# Patient Record
Sex: FEMALE | Race: WHITE | NOT HISPANIC OR LATINO | URBAN - METROPOLITAN AREA
[De-identification: names, ages, dates, MRNs, and addresses within clinical notes are randomized per-mention and may not be internally consistent; named-entity substitution may affect disease eponyms.]

---

## 2018-04-24 ENCOUNTER — ON CAMPUS - OUTPATIENT (OUTPATIENT)
Dept: URBAN - METROPOLITAN AREA HOSPITAL 108 | Facility: HOSPITAL | Age: 68
End: 2018-04-24
Payer: COMMERCIAL

## 2018-04-24 DIAGNOSIS — K63.5 POLYP OF COLON: ICD-10-CM

## 2018-04-24 DIAGNOSIS — D12.2 BENIGN NEOPLASM OF ASCENDING COLON: ICD-10-CM

## 2018-04-24 DIAGNOSIS — K57.30 DIVERTICULOSIS OF LARGE INTESTINE WITHOUT PERFORATION OR ABS: ICD-10-CM

## 2018-04-24 PROCEDURE — 45390 COLONOSCOPY W/RESECTION: CPT

## 2022-08-31 ENCOUNTER — OUTSIDE FACILITY SERVICE (OUTPATIENT)
Dept: CARDIOLOGY | Facility: CLINIC | Age: 72
End: 2022-08-31

## 2022-08-31 PROCEDURE — 99222 1ST HOSP IP/OBS MODERATE 55: CPT | Performed by: NURSE PRACTITIONER

## 2022-09-09 ENCOUNTER — HOSPITAL ENCOUNTER (INPATIENT)
Facility: HOSPITAL | Age: 72
LOS: 19 days | Discharge: HOME HEALTH | End: 2022-09-28
Attending: PHYSICAL MEDICINE & REHABILITATION | Admitting: PHYSICAL MEDICINE & REHABILITATION

## 2022-09-09 DIAGNOSIS — Z09 FOLLOW-UP EXAM: ICD-10-CM

## 2022-09-09 PROBLEM — I72.9 ANEURYSM: Status: ACTIVE | Noted: 2022-09-09

## 2022-09-09 RX ORDER — LISINOPRIL 10 MG/1
10 TABLET ORAL
Status: DISCONTINUED | OUTPATIENT
Start: 2022-09-10 | End: 2022-09-12

## 2022-09-09 RX ORDER — ALBUTEROL SULFATE 2.5 MG/3ML
2.5 SOLUTION RESPIRATORY (INHALATION) EVERY 6 HOURS PRN
Status: DISCONTINUED | OUTPATIENT
Start: 2022-09-09 | End: 2022-09-27

## 2022-09-09 RX ORDER — HYDROCODONE BITARTRATE AND ACETAMINOPHEN 5; 325 MG/1; MG/1
1 TABLET ORAL EVERY 4 HOURS PRN
Status: DISCONTINUED | OUTPATIENT
Start: 2022-09-09 | End: 2022-09-20

## 2022-09-09 RX ORDER — AMLODIPINE BESYLATE 5 MG/1
5 TABLET ORAL
Status: DISCONTINUED | OUTPATIENT
Start: 2022-09-10 | End: 2022-09-28 | Stop reason: HOSPADM

## 2022-09-09 RX ORDER — ATORVASTATIN CALCIUM 20 MG/1
40 TABLET, FILM COATED ORAL NIGHTLY
Status: DISCONTINUED | OUTPATIENT
Start: 2022-09-09 | End: 2022-09-28 | Stop reason: HOSPADM

## 2022-09-09 RX ORDER — SERTRALINE HYDROCHLORIDE 25 MG/1
25 TABLET, FILM COATED ORAL DAILY
Status: DISCONTINUED | OUTPATIENT
Start: 2022-09-10 | End: 2022-09-28 | Stop reason: HOSPADM

## 2022-09-09 RX ORDER — PANTOPRAZOLE SODIUM 40 MG/1
40 TABLET, DELAYED RELEASE ORAL
Status: DISCONTINUED | OUTPATIENT
Start: 2022-09-09 | End: 2022-09-28 | Stop reason: HOSPADM

## 2022-09-09 RX ORDER — SERTRALINE HYDROCHLORIDE 100 MG/1
100 TABLET, FILM COATED ORAL DAILY
Status: ON HOLD | COMMUNITY
End: 2022-09-12

## 2022-09-09 RX ORDER — ATORVASTATIN CALCIUM 40 MG/1
40 TABLET, FILM COATED ORAL DAILY
Status: ON HOLD | COMMUNITY
End: 2022-09-12

## 2022-09-09 RX ORDER — PANTOPRAZOLE SODIUM 40 MG/1
40 TABLET, DELAYED RELEASE ORAL DAILY
Status: ON HOLD | COMMUNITY
End: 2022-09-12

## 2022-09-09 RX ORDER — PANTOPRAZOLE SODIUM 20 MG/1
20 TABLET, DELAYED RELEASE ORAL
Status: DISCONTINUED | OUTPATIENT
Start: 2022-09-09 | End: 2022-09-09 | Stop reason: CLARIF

## 2022-09-09 RX ORDER — MELATONIN
2000 DAILY
Status: DISCONTINUED | OUTPATIENT
Start: 2022-09-10 | End: 2022-09-28 | Stop reason: HOSPADM

## 2022-09-09 RX ORDER — HYDROCODONE BITARTRATE AND ACETAMINOPHEN 5; 325 MG/1; MG/1
2 TABLET ORAL EVERY 4 HOURS PRN
Status: DISCONTINUED | OUTPATIENT
Start: 2022-09-09 | End: 2022-09-16

## 2022-09-09 RX ADMIN — HYDROCODONE BITARTRATE AND ACETAMINOPHEN 1 TABLET: 5; 325 TABLET ORAL at 22:54

## 2022-09-10 ENCOUNTER — APPOINTMENT (OUTPATIENT)
Dept: OTHER | Facility: HOSPITAL | Age: 72
End: 2022-09-10

## 2022-09-10 PROCEDURE — 97166 OT EVAL MOD COMPLEX 45 MIN: CPT

## 2022-09-10 PROCEDURE — 92610 EVALUATE SWALLOWING FUNCTION: CPT

## 2022-09-10 PROCEDURE — 92522 EVALUATE SPEECH PRODUCTION: CPT

## 2022-09-10 PROCEDURE — 97162 PT EVAL MOD COMPLEX 30 MIN: CPT

## 2022-09-10 PROCEDURE — 97110 THERAPEUTIC EXERCISES: CPT

## 2022-09-10 PROCEDURE — 97535 SELF CARE MNGMENT TRAINING: CPT

## 2022-09-10 PROCEDURE — 25010000002 ENOXAPARIN PER 10 MG: Performed by: PHYSICAL MEDICINE & REHABILITATION

## 2022-09-10 PROCEDURE — 97530 THERAPEUTIC ACTIVITIES: CPT

## 2022-09-10 RX ORDER — ENOXAPARIN SODIUM 100 MG/ML
40 INJECTION SUBCUTANEOUS NIGHTLY
Status: DISCONTINUED | OUTPATIENT
Start: 2022-09-10 | End: 2022-09-28 | Stop reason: HOSPADM

## 2022-09-10 RX ADMIN — ENOXAPARIN SODIUM 40 MG: 100 INJECTION SUBCUTANEOUS at 20:44

## 2022-09-10 RX ADMIN — SERTRALINE 25 MG: 25 TABLET, FILM COATED ORAL at 10:01

## 2022-09-10 RX ADMIN — AMLODIPINE BESYLATE 5 MG: 5 TABLET ORAL at 10:01

## 2022-09-10 RX ADMIN — LISINOPRIL 10 MG: 10 TABLET ORAL at 10:01

## 2022-09-10 RX ADMIN — HYDROCODONE BITARTRATE AND ACETAMINOPHEN 1 TABLET: 5; 325 TABLET ORAL at 06:19

## 2022-09-10 RX ADMIN — ATORVASTATIN CALCIUM 40 MG: 20 TABLET, FILM COATED ORAL at 20:44

## 2022-09-10 RX ADMIN — Medication 2000 UNITS: at 10:02

## 2022-09-10 RX ADMIN — PANTOPRAZOLE SODIUM 40 MG: 40 TABLET, DELAYED RELEASE ORAL at 06:19

## 2022-09-10 NOTE — THERAPY EVALUATION
Inpatient Rehabilitation - Speech Language Pathology Initial Evaluation    [facility]     Patient Name: Summer Smith  : 1950  MRN: [id number]    Today's Date: 9/10/2022                   Admit Date: 2022       Visit Dx:      ICD-10-CM ICD-9-CM   1. Follow-up exam  Z09 V67.9       Patient Active Problem List   Diagnosis   • Aneurysm (HCC)       Past Medical History:   Diagnosis Date   • COPD (chronic obstructive pulmonary disease) (HCC)    • Elevated cholesterol    • GERD (gastroesophageal reflux disease)        Past Surgical History:   Procedure Laterality Date   • CARDIAC CATHETERIZATION     • COLON SURGERY     • COLONOSCOPY     • SKIN BIOPSY         SLP Recommendation and Plan                      Therapy Frequency (Swallow): 5 days per week (09/10/22 0800)  Predicted Duration Therapy Intervention (Days): until discharge, 4 weeks, other (see comments) (09/10/22 08)                                EDUCATION    The patient has been educated in the following areas:       Dysphagia (Swallowing Impairment).             SLP GOALS     Row Name 09/10/22 0800             (LTG) Patient will demonstrate functional swallow for    [place] (Demonstrate functional swallow) independently (over 90% accuracy)  -LS              (LTG) Patient will demonstrate progress toward functional swallow for    Diet Texture (Demonstrate progress toward functional swallow) soft ground textures  -LS      Liquid viscosity (Demonstrate progress toward functional swallow) thin liquids  -LS      [place] (Demonstrate progress towards functional swallow) with moderate cues (50-74% accuracy)  -LS            User Key  (r) = Recorded By, (t) = Taken By, (c) = Cosigned By    Initials Name Provider Type    Geovany Tolliver MS CCC-SLP Speech and Language Pathologist                SLP Outcome Measures (last 72 hours)     SLP Outcome Measures     Row Name 09/10/22 1200             SLP Outcome Measures    Outcome Measure  Used? Adult NOMS  -LS              Adult FCM Scores    FCM Chosen Swallowing  -LS      Swallowing FCM Score 4  -LS            User Key  (r) = Recorded By, (t) = Taken By, (c) = Cosigned By    Initials Name Effective Dates    Geovany Tolliver MS CCC-SLP 06/16/21 -                         Time Calculation:        Time Calculation- SLP     Row Name 09/10/22 1201             Time Calculation- SLP    SLP Start Time 0800  -LS      SLP Stop Time 0830  -LS      SLP Time Calculation (min) 30 min  -LS            User Key  (r) = Recorded By, (t) = Taken By, (c) = Cosigned By    Initials Name Provider Type    Geovany Tolliver MS CCC-SLP Speech and Language Pathologist                  Therapy Charges for Today     Code Description Service Date Service Provider Modifiers Qty    [id number]  ST EVAL ORAL PHARYNG SWALLOW 2 9/10/2022 Geovany Porras MS CCC-SLP GN 1            ADULT NOMS (last 72 hours)     Adult NOMS     Row Name 09/10/22 1200                   Adult FCM Scores    FCM Chosen Swallowing  -LS        Swallowing FCM Score 4  -LS              User Key  (r) = Recorded By, (t) = Taken By, (c) = Cosigned By    Initials Name Effective Dates    Geovany Tolliver MS CCC-SLP 06/16/21 -                            Geovany Porras MS CCC-SLP  9/10/2022

## 2022-09-10 NOTE — THERAPY EVALUATION
Inpatient Rehabilitation - Physical Therapy Initial Evaluation       [facility]     Patient Name: Summer Smith  : 1950  MRN: [id number]    Today's Date: 9/10/2022                    Admit Date: 2022      Visit Dx:     ICD-10-CM ICD-9-CM   1. Follow-up exam  Z09 V67.9       Patient Active Problem List   Diagnosis   • Aneurysm (HCC)       Past Medical History:   Diagnosis Date   • COPD (chronic obstructive pulmonary disease) (HCC)    • Elevated cholesterol    • GERD (gastroesophageal reflux disease)        Past Surgical History:   Procedure Laterality Date   • CARDIAC CATHETERIZATION     • COLON SURGERY     • COLONOSCOPY     • SKIN BIOPSY         PT ASSESSMENT (last 12 hours)     IRF PT Evaluation and Treatment     Row Name 09/10/22 0952          PT Time and Intention    Document Type initial evaluation  -EE     Mode of Treatment physical therapy;individual therapy  -EE     Patient/Family/Caregiver Comments/Observations Pt supine in bed, agreeable to PT. Family present at bedside.  -EE     Row Name 09/10/22 0952          General Information    Existing Precautions/Restrictions fall;oxygen therapy device and L/min  colostomy, 4 L O2 nc  -EE     Limitations/Impairments safety/cognitive  -EE     Row Name 09/10/22 0952          Previous Level of Function/Home Environm    Bed Mobility, Premorbid Functional Level independent  -EE     Transfers, Premorbid Functional Level independent  -EE     Household Ambulation, Premorbid Functional Level independent  -EE     Stairs, Premorbid Functional Level independent  -EE     Community Ambulation, Premorbid Functional Level independent  -EE     Row Name 09/10/22 0952          Living Environment    Home Accessibility wheelchair accessible  1 KVNG but also has ramp present  -EE     People in Home spouse  -EE     Row Name 09/10/22 0952          Pain Assessment    Pretreatment Pain Rating 0/10 - no pain  -EE     Posttreatment Pain Rating 5/10  -EE     Pain Location  incisional  -EE     Pain Location - abdomen  -EE     Pre/Posttreatment Pain Comment Pt provided rest breaks as needed throughout session. Pre-medicated prior to tx.  -EE     Row Name 09/10/22 0952          Cognition/Psychosocial    Affect/Mental Status (Cognition) flat/blunted affect  -EE     Orientation Status (Cognition) oriented to;person;place  -EE     Follows Commands (Cognition) follows one-step commands;over 90% accuracy;increased processing time needed;repetition of directions required;verbal cues/prompting required  -EE     Personal Safety Interventions fall prevention program maintained;gait belt;muscle strengthening facilitated;nonskid shoes/slippers when out of bed;supervised activity  -EE     Cognitive Function safety deficit  -EE     Safety Deficit (Cognition) minimal deficit;insight into deficits/self-awareness;safety precautions awareness  -EE     Row Name 09/10/22 0952          Range of Motion (ROM)    Range of Motion bilateral lower extremities;ROM is WFL  -     Row Name 09/10/22 0952          Strength Comprehensive (MMT)    General Manual Muscle Testing (MMT) Assessment lower extremity strength deficits identified  -     Row Name 09/10/22 0952          Lower Extremity (Manual Muscle Testing)    Lower Extremity: Manual Muscle Testing (MMT) right hip strength deficit;left knee strength deficit;right knee strength deficit;left ankle strength deficit;right ankle strength deficit;left hip strength deficit  -     Row Name 09/10/22 0952          Left Hip (Manual Muscle Testing)    MMT, Left Hip: Manual Muscle Testing (MMT) L hip flexion 3-/5, L hip abd 3-/5, L hip add 3/5  -EE     Row Name 09/10/22 0952          Right Hip (Manual Muscle Testing)    MMT, Right Hip: Manual Muscle Testing (MMT) R hip flexion 2+/5, R hip abd 3-/5, R hip add 3/5  -EE     Row Name 09/10/22 0952          Left Knee (Manual Muscle Testing)    Comment, Left Knee: Manual Muscle Testing (MMT) L knee ext 3+/5, L knee flex 3/5   -EE     Row Name 09/10/22 0952          Right Knee (Manual Muscle Testing)    Comment, Right Knee: Manual Muscle Testing (MMT) R knee ext 3+/5, R knee flex 3/5  -EE     Row Name 09/10/22 0952          Left Ankle/Foot (Manual Muscle Testing)    Comment, MMT: Left Ankle/Foot L ankle DF 3+/5  -EE     Row Name 09/10/22 0952          Right Ankle/Foot (Manual Muscle Testing)    Comment, MMT: Right Ankle/Foot R ankle DF 3+/5  -EE     Row Name 09/10/22 0952          Sensory    Additional Documentation Sensory Assessment (Somatosensory) (Group)  -     Row Name 09/10/22 0952          Sensory Assessment (Somatosensory)    Sensory Assessment (Somatosensory) LE sensation intact  -     Row Name 09/10/22 0952          Mobility    Additional Documentation Wheelchair Mobility/Management (Group)  -     Row Name 09/10/22 0952          Bed Mobility    Bed Mobility rolling right;rolling left;supine-sit;sit-supine  -EE     Rolling Left [place] (Bed Mobility) moderate assist (50% patient effort);verbal cues  -EE     Rolling Right [place] (Bed Mobility) moderate assist (50% patient effort);verbal cues  -EE     Supine-Sit [place] (Bed Mobility) moderate assist (50% patient effort);maximum assist (25% patient effort);1 person assist;verbal cues  -EE     Sit-Supine [place] (Bed Mobility) moderate assist (50% patient effort);2 person assist;verbal cues;nonverbal cues (demo/gesture)  -     Assistive Device (Bed Mobility) bed rails;head of bed elevated  -     Comment, (Bed Mobility) cues for sequencing  -     Row Name 09/10/22 0952          Transfer Assessment/Treatment    Transfers sit-stand transfer;stand-sit transfer;bed-chair transfer  -[facility] Name 09/10/22 0952          Transfers    Bed-Chair [place] (Transfers) moderate assist (50% patient effort);2 person assist;verbal cues;nonverbal cues (demo/gesture)  -     Sit-Stand [place] (Transfers) moderate assist (50% patient effort);1 person  assist;2 person assist;other (see comments);verbal cues;nonverbal cues (demo/gesture)  -EE     Stand-Sit [place] (Transfers) moderate assist (50% patient effort);1 person assist;verbal cues;nonverbal cues (demo/gesture)  -EE     Row Name 09/10/22 0952          Bed-Chair Transfer    Comment, (Bed-Chair Transfer) stand pivot transfer with HHA, partial R knee buckling noted during pivot to chair  -EE     Row Name 09/10/22 0952          Sit-Stand Transfer    Assistive Device (Sit-Stand Transfers) parallel bars;wheelchair  -EE     Comment, (Sit-Stand Transfer) mod A x1 on first attempt; required mod A x2 on subsequent attempts due to fatigue  -EE     Row Name 09/10/22 0952          Stand-Sit Transfer    Assistive Device (Stand-Sit Transfers) wheelchair  -EE     Row Name 09/10/22 0952          Gait/Stairs (Locomotion)    [place] Level (Gait) unable to assess;other (see comments)  attempted taking step forward in // bars; pt unable to advance LE and maintain standing balance  -EE     Row Name 09/10/22 0952          Wheelchair Mobility/Management    Method of Wheelchair Locomotion (Mobility) bipedal (lower extremity) propulsion  -EE     Mobility Activities (Wheelchair) forward propulsion  -EE     Forward Propulsion [place] (Wheelchair) standby assist;verbal cues  -EE     Distance Propelled in Feet (Wheelchair) 30' x 1  -EE     Row Name 09/10/22 0952          Safety Issues, Functional Mobility    Impairments Affecting Function (Mobility) balance;cognition;endurance/activity tolerance;pain;postural/trunk control;strength  -EE     Row Name 09/10/22 0952          Balance    Balance Assessment sitting static balance;standing static balance  -EE     Static Sitting Balance standby assist  -EE     Position, Sitting Balance unsupported;sitting edge of bed  -EE     Static Standing Balance minimal assist;moderate assist  -EE     Position/Device Used, Standing Balance parallel bars  -EE     Row Name 09/10/22 0952           Motor Skills    Therapeutic Exercise hip;knee  -EE     Row Name 09/10/22 0952          Hip (Therapeutic Exercise)    Hip (Therapeutic Exercise) strengthening exercise  -EE     Hip Strengthening (Therapeutic Exercise) sitting;bilateral;marching while seated;5 repetitions  -EE     Row Name 09/10/22 0952          Knee (Therapeutic Exercise)    Knee (Therapeutic Exercise) strengthening exercise  -EE     Knee Strengthening (Therapeutic Exercise) sitting;bilateral;LAQ (long arc quad);5 repetitions  -EE     Row Name 09/10/22 0952          Positioning and Restraints    Pre-Treatment Position in bed  -EE     Post Treatment Position wheelchair  -EE     In Wheelchair sitting;exit alarm on;with SLP  -EE     Row Name 09/10/22 0952          Vital Signs    Pre SpO2 (%) 95  -EE     O2 Delivery Pre Treatment supplemental O2  -EE     Intra SpO2 (%) 85  -EE     O2 Delivery Intra Treatment supplemental O2  -EE     Post SpO2 (%) 93  -EE     O2 Delivery Post Treatment supplemental O2  -EE     Pre Patient Position Supine  -EE     Intra Patient Position Standing  -EE     Post Patient Position Sitting  -EE     Row Name 09/10/22 0952          Therapy Assessment/Plan (PT)    Patient's Goals For Discharge return home;take care of myself at home  -EE     Row Name 09/10/22 0952          Therapy Assessment/Plan (PT)    Rehab Potential/Prognosis (PT) good, to achieve stated therapy goals  -EE     Frequency of Treatment (PT) 60 minutes per session;5 times per week  -EE     Estimated Duration of Therapy (PT) 4 weeks  -EE     Problem List (PT) balance;mobility;motor control;strength;postural control  -EE     Activity Limitations Related to Problem List (PT) unable to transfer safely;unable to ambulate safely  -EE     Comment, Therapy Assessment/Plan (PT) Pt is a 72 yo female who presents s/p L MCA aneurysm with B PNA, diverticulitis s/p ex lap w bowel resection and colostomy. Pt had post op complications and required prolonged intubation. Prior to  admission, pt was living at home and independent with all mobility. Pt has no history of assistive device use. Upon exam, pt demonstrates generalized weakness, impaired balance, impaired trunk control, and decreased endurance limiting mobility. Pt requires assist x2 for bed mobility and transfers; unable to ambulate at this time due to weakness and fatigue. Pt will continue to benefit from PT to address impairments and increase independence with functional mobility.  -EE     Row Name 09/10/22 0952          IRF PT Goals    Bed Mobility Goal Selection (PT-IRF) bed mobility, PT goal 1;bed mobility, PT goal 2  -EE     Transfer Goal Selection (PT-IRF) transfers, PT goal 1;transfers, PT goal 2;transfers, PT goal 3  -EE     Gait (Walking Locomotion) Goal Selection (PT-IRF) gait, PT goal 1;gait, PT goal 2  -EE     Wheelchair Locomotion Goal Selection (PT-IRF) wheelchair locomotion, PT goal 1  -EE     Row Name 09/10/22 0952          Bed Mobility Goal 1 (PT-IRF)    Activity/Assistive Device (Bed Mobility Goal 1, PT-IRF) sit to supine/supine to sit  -EE     [place] Level (Bed Mobility Goal 1, PT-IRF) minimum assist (75% or more patient effort)  -EE     Time Frame (Bed Mobility Goal 1, PT-IRF) 2 weeks;short-term goal (STG)  -EE     Progress/Outcomes (Bed Mobility Goal 1, PT-IRF) goal ongoing  -EE     Row Name 09/10/22 0952          Bed Mobility Goal 2 (PT-IRF)    Activity/Assistive Device (Bed Mobility Goal 2, PT-IRF) sit to supine/supine to sit;rolling to left;rolling to right  -EE     [place] Level (Bed Mobility Goal 2, PT-IRF) independent  -EE     Time Frame (Bed Mobility Goal 2, PT-IRF) 4 weeks;long-term goal (LTG)  -EE     Progress/Outcomes (Bed Mobility Goal 2, PT-IRF) goal ongoing  -EE     Row Name 09/10/22 0952          Transfer Goal 1 (PT-IRF)    Activity/Assistive Device (Transfer Goal 1, PT-IRF) sit-to-stand/stand-to-sit;bed-to-chair/chair-to-bed  -EE     [place] Level (Transfer Goal 1, PT-IRF)  moderate assist (50-74% patient effort)  -EE     Time Frame (Transfer Goal 1, PT-IRF) 2 weeks;short-term goal (STG)  -EE     Progress/Outcomes (Transfer Goal 1, PT-IRF) goal ongoing  -EE     Row Name 09/10/22 0952          Transfer Goal 2 (PT-IRF)    Activity/Assistive Device (Transfer Goal 2, PT-IRF) sit-to-stand/stand-to-sit;bed-to-chair/chair-to-bed  -EE     [place] Level (Transfer Goal 2, PT-IRF) contact guard required  -EE     Time Frame (Transfer Goal 2, PT-IRF) 4 weeks;long-term goal (LTG)  -EE     Progress/Outcomes (Transfer Goal 2, PT-IRF) goal ongoing  -EE     Row Name 09/10/22 0952          Transfer Goal 3 (PT-IRF)    Activity/Assistive Device (Transfer Goal 3, PT-IRF) car transfer  -EE     [place] Level (Transfer Goal 3, PT-IRF) contact guard required  -EE     Time Frame (Transfer Goal 3, PT-IRF) 4 weeks;long-term goal (LTG)  -EE     Progress/Outcomes (Transfer Goal 3, PT-IRF) goal ongoing  -EE     Row Name 09/10/22 0952          Gait/Walking Locomotion Goal 1 (PT-IRF)    Activity/Assistive Device (Gait/Walking Locomotion Goal 1, PT-IRF) gait (walking locomotion);other (see comments)  in // bars  -EE     Gait/Walking Locomotion Distance Goal 1 (PT-IRF) 8'  -EE     [place] Level (Gait/Walking Locomotion Goal 1, PT-IRF) minimum assist (75% or more patient effort)  x2 person assist  -EE     Time Frame (Gait/Walking Locomotion Goal 1, PT-IRF) 2 weeks;short-term goal (STG)  -EE     Progress/Outcomes (Gait/Walking Locomotion Goal 1, PT-IRF) goal ongoing  -EE     Row Name 09/10/22 0952          Gait/Walking Locomotion Goal 2 (PT-IRF)    Activity/Assistive Device (Gait/Walking Locomotion Goal 2, PT-IRF) gait (walking locomotion);walker, rolling  -EE     Gait/Walking Locomotion Distance Goal 2 (PT-IRF) 80'  -EE     [place] Level (Gait/Walking Locomotion Goal 2, PT-IRF) contact guard required  -EE     Time Frame (Gait/Walking Locomotion Goal 2, PT-IRF) 4 weeks;long-term goal (LTG)  -EE      Progress/Outcomes (Gait/Walking Locomotion Goal 2, PT-IRF) goal ongoing  -EE     Row Name 09/10/22 0952          Wheelchair Locomotion Goal 1 (PT-IRF)    Activity (Wheelchair Locomotion Goal 1, PT-IRF) forward propulsion;steering;turning  -EE     [place] Level (Wheelchair Locomotion Goal 1, PT-IRF) modified independence  -EE     Distance Goal 1 (Wheelchair Locomotion, PT-IRF) 80' x 1  -EE     Time Frame (Wheelchair Locomotion Goal 1, PT-IRF) 2 weeks;short-term goal (STG)  -EE     Progress/Outcomes (Wheelchair Locomotion Goal 1, PT-IRF) goal ongoing  -EE           User Key  (r) = Recorded By, (t) = Taken By, (c) = Cosigned By    Initials Name Provider Type    EE Rand Patel, MARIBEL Physical Therapist              Wound 09/09/22 2200 Left midline abdomen Incision (Active)   Dressing Appearance open to air 09/10/22 1025   Closure Adhesive closure strips 09/10/22 1025   Drainage Amount small 09/10/22 1025   Care, Wound cleansed with;soap and water 09/10/22 1025   Dressing Care open to air 09/10/22 1025     Physical Therapy Education                 Title: PT OT SLP Therapies (Done)     Topic: Physical Therapy (Done)     Point: Mobility training (Done)     Learning Progress Summary           Patient Acceptance, E,TB, VU,NR by EE at 9/10/2022 1518                   Point: Home exercise program (Done)     Learning Progress Summary           Patient Acceptance, E,TB, VU,NR by EE at 9/10/2022 1518                   Point: Body mechanics (Done)     Learning Progress Summary           Patient Acceptance, E,TB, VU,NR by EE at 9/10/2022 1518                   Point: Precautions (Done)     Learning Progress Summary           Patient Acceptance, E,TB, VU,NR by EE at 9/10/2022 1518                               User Key     Initials Effective Dates Name Provider Type Discipline    EE 06/16/21 -  Rand Patel, MARIBEL Physical Therapist PT                PT Recommendation and Plan    Planned Therapy Interventions (PT): balance  training, bed mobility training, gait training, home exercise program, neuromuscular re-education, patient/family education, ROM (range of motion), strengthening, stair training, stretching, transfer training, wheelchair management/propulsion training  Frequency of Treatment (PT): 60 minutes per session, 5 times per week                     Time Calculation:      PT Charges     Row Name 09/10/22 1517             Time Calculation    Start Time 0900  -EE      Stop Time 1000  -EE      Time Calculation (min) 60 min  -EE      PT Received On 09/10/22  -EE      PT - Next Appointment 09/12/22  -EE      PT Goal Re-Cert Due Date 09/17/22  -EE              Time Calculation- PT    Total Timed Code Minutes- PT 45 minute(s)  -EE            User Key  (r) = Recorded By, (t) = Taken By, (c) = Cosigned By    Initials Name Provider Type    Rand Dietrich, PT Physical Therapist                Therapy Charges for Today     Code Description Service Date Service Provider Modifiers Qty    59837951098  PT EVAL MOD COMPLEXITY 2 9/10/2022 Rand Patel, PT GP 1    [id number]  PT THERAPEUTIC ACT EA 15 MIN 9/10/2022 Rand Patel, PT GP 2    25687309349 HC PT THER PROC EA 15 MIN 9/10/2022 Rand Patel, PT GP 1    56117043867 HC PT THER SUPP EA 15 MIN 9/10/2022 Rand Patel, PT GP 2              Patient was intermittently wearing a face mask during this therapy encounter. Therapist used appropriate personal protective equipment including mask and gloves.  Mask used was standard procedure mask. Appropriate PPE was worn during the entire therapy session. Hand hygiene was completed before and after therapy session. Patient is not in enhanced droplet precautions.         Rand Patel PT  9/10/2022

## 2022-09-10 NOTE — THERAPY EVALUATION
Inpatient Rehabilitation - Occupational Therapy Treatment Note    [facility]     Patient Name: Summer Smith  : 1950  MRN: [id number]    Today's Date: 9/10/2022                 Admit Date: 2022         ICD-10-CM ICD-9-CM   1. Follow-up exam  Z09 V67.9       Patient Active Problem List   Diagnosis   • Aneurysm (HCC)       Past Medical History:   Diagnosis Date   • COPD (chronic obstructive pulmonary disease) (HCC)    • Elevated cholesterol    • GERD (gastroesophageal reflux disease)        Past Surgical History:   Procedure Laterality Date   • CARDIAC CATHETERIZATION     • COLON SURGERY     • COLONOSCOPY     • SKIN BIOPSY               IRF OT ASSESSMENT FLOWSHEET (last 12 hours)     IRF OT Evaluation and Treatment     Row Name 09/10/22 1116          OT Time and Intention    Document Type initial evaluation  -KA     Mode of Treatment individual therapy;occupational therapy  -KA     Patient Effort good  Demonstrated good effort but pt very fatigued after completing previous therapies  -KA     Symptoms Noted During/After Treatment fatigue;increased pain  reported 6/10 pain in stomach. Nsg present upon arrival  -KA     Row Name 09/10/22 1116          General Information    Patient/Family/Caregiver Comments/Observations Pt supine in bed with nsg upon arrival. RN stated pt was very fatigued from previous therapies and incision was leaking. Requested to perform in bed activities. Pt agreeable to bed level ADL and assessment  -KA     Existing Precautions/Restrictions fall;oxygen therapy device and L/min  colostomy  -KA     Limitations/Impairments safety/cognitive  -KA     Row Name 09/10/22 1116          Previous Level of Function/Home Environm    Bathing/Grooming, Premorbid Functional Level independent  -KA     Dressing, Premorbid Functional Level independent  -KA     Eating/Feeding, Premorbid Functional Level independent  -KA     Toileting, Premorbid Functional Level independent  -KA     BADLs, Premorbid  Functional Level independent  -KA     IADLs, Premorbid Functional Level independent  -[facility] Name 09/10/22 1116          Living Environment    Current Living Arrangements home  -     Home Accessibility wheelchair accessible  pt's  reports 1 KVNG but also has a ramp present  -     People in Home spouse  -[facility] Name 09/10/22 1116          Home Use of Assistive/Adaptive Equipment    Equipment Currently Used at Home none  -[facility] Name 09/10/22 1116          Pain Assessment    Pretreatment Pain Rating 5/10  -KA     Posttreatment Pain Rating 5/10  -KA     Pain Location incisional  -     Pain Location - abdomen  -[facility] Name 09/10/22 1116          Cognition/Psychosocial    Affect/Mental Status (Cognition) flat/blunted affect  -     Orientation Status (Cognition) oriented to;person;place  -     Follows Commands (Cognition) follows one-step commands;over 90% accuracy;increased processing time needed;repetition of directions required;verbal cues/prompting required  -     Safety Deficit (Cognition) minimal deficit;insight into deficits/self-awareness;safety precautions awareness  -[facility] Name 09/10/22 1116          Range of Motion (ROM)    Range of Motion left upper extremity ROM;right upper extremity ROM  -     Left Upper Extremity (ROM) left UE ROM is WFL  slow movements with ROM  -     Right Upper Extremity (ROM) --  RUE shoulder flex ~90 degrees. Assessment performed sitting up in bed due to fatigue. WFL elbow-digits  -[facility] Name 09/10/22 1116          Strength Comprehensive (MMT)    General Manual Muscle Testing (MMT) Assessment upper extremity strength deficits identified  -KA     Comment, General Manual Muscle Testing (MMT) Assessment BUE 3-/5 shoulder flex and elbow flex. Limited assesment due to pt's fatigue  Performed sitting up in bed due to increased fatigue  -[facility] Name 09/10/22 1116          Basic Activities of Daily Living (BADLs)    Basic Activities of Daily  Living bathing;upper body dressing;lower body dressing;grooming  -KA     Row Name 09/10/22 1116          Bathing    [place] Level (Bathing) upper body;minimum assist (75% patient effort);moderate assist (50% patient effort);lower body;dependent (less than 25% patient effort)  -     Position (Bathing) sitting up in bed  -     Set-up Assistance (Bathing) obtain supplies;open containers  -     Comment (Bathing) Sitting up in bed due to pt's fatigue  -KA     Row Name 09/10/22 1116          Upper Body Dressing    [place] Level (Upper Body Dressing) upper body dressing skills;don;minimum assist (75% or more patient effort);moderate assist (50-74% patient effort);doff  Pt requesting to don new gown. Declined changing into shirt  -     Position (Upper Body Dressing) sitting up in bed  -     Set-up Assistance (Upper Body Dressing) obtain clothing  -KA     Row Name 09/10/22 1116          Lower Body Dressing    [place] Level (Lower Body Dressing) dependent (less than 25% patient effort)  -     Position (Lower Body Dressing) sitting up in bed;supine  -KA     Row Name 09/10/22 1116          Grooming    [place] Level (Grooming) grooming skills;wash face, hands;minimum assist (75% patient effort)  -     Position (Grooming) sitting up in bed  -     Set-up Assistance (Grooming) open containers;obtain supplies  -KA     Row Name 09/10/22 1116          Bed Mobility    Rolling Left [place] (Bed Mobility) moderate assist (50% patient effort);verbal cues  -     Rolling Right [place] (Bed Mobility) moderate assist (50% patient effort);verbal cues  -KA     Row Name 09/10/22 1116          Functional Mobility    Functional Mobility- Ind. Level not tested  -KA     Row Name 09/10/22 1116          Transfers    Bed-Chair [place] (Transfers) not tested  -KA     Row Name 09/10/22 1116          Motor Skills    Motor Skills coordination;functional endurance  -     Coordination 9 Hole Peg Test of  Fine Motor Coordination Results  -     Results, 9 Hole Peg Test of Fine Motor Coordination Not assessed this date due to fatigue.  -KA     Functional Endurance Poor- pt very fatigued this AM, only able to complete bed level ADLs  -     Row Name 09/10/22 1116          Balance    Additional Documentation --  Unable to assess this date due to increased fatigue  -     Row Name 09/10/22 1116          Positioning and Restraints    Pre-Treatment Position in bed  with nsg present  -     Post Treatment Position bed  -     In Bed supine;call light within reach;exit alarm on;encouraged to call for assist;with family/caregiver;with other staff  -     Row Name 09/10/22 1116          Therapy Assessment/Plan (OT)    OT Diagnosis Pt seen today for OT eval. Pt admit from [place] who had previously been at [facility] where she was treated for diverticulitis, s/p bowel resection and colostomy placed (8/24). Post op complications included respiratory issues requiring intubation and found to have large MCA aneurysm. Pt extubated on 9/1. Pt presents today with decreased functional mobility, endurance, activity tolerance, and strength. Pt only able to complete ADLs bed level after participating in other therapies this AM.  Pt to benefit from skilled OT to address deficits and maximize independence with ADLs.  -KA     Frequency of Treatment (OT) 5 times per week  -KA     Estimated Duration of Therapy (OT) 4 weeks  -KA     Problem List (OT) balance;mobility;strength;pain  -     Row Name 09/10/22 1116          IRF OT Goals    Transfer Goal Selection (OT-IRF) transfers, OT goal 1;transfers, OT goal 2  -KA     Bathing Goal Selection (OT-IRF) bathing, OT goal 1;bathing, OT goal 2  -KA     UB Dressing Goal Selection (OT-IRF) UB dressing, OT goal 1;UB dressing, OT goal 2  -KA     LB Dressing Goal Selection (OT-IRF) LB dressing, OT goal 1;LB dressing, OT goal 2  -KA     Grooming Goal Selection (OT-IRF) grooming, OT goal  1;grooming, OT goal 2  -KA     Toileting Goal Selection (OT-IRF) toileting, OT goal 1;toileting, OT goal 2  -KA     Strength Goal Selection (OT-IRF) strength, OT goal 1 (free text)  -KA     Activity Tolerance Goal Selection (OT) activity tolerance, OT goal 1  -KA     Caregiver Training Goal Selection (OT-IRF) caregiver training, OT goal 1  -KA     Row Name 09/10/22 1116          Transfer Goal 1 (OT-IRF)    Activity/Assistive Device (Transfer Goal 1, OT-IRF) toilet;shower chair  -KA     [place] Level (Transfer Goal 1, OT-IRF) moderate assist (50-74% patient effort);maximum assist (25-49% patient effort)  -KA     Time Frame (Transfer Goal 1, OT-IRF) short-term goal (STG)  -KA     Progress/Outcomes (Transfer Goal 1, OT-IRF) goal ongoing  -KA     Row Name 09/10/22 1116          Transfer Goal 2 (OT-IRF)    Activity/Assistive Device (Transfer Goal 2, OT-IRF) toilet;shower chair  -KA     [place] Level (Transfer Goal 2, OT-IRF) contact guard required;minimum assist (75% or more patient effort)  -KA     Time Frame (Transfer Goal 2, OT-IRF) long-term goal (LTG)  -KA     Progress/Outcomes (Transfer Goal 2, OT-IRF) goal ongoing  -KA     Row Name 09/10/22 1116          Bathing Goal 1 (OT-IRF)    Activity/Device (Bathing Goal 1, OT-IRF) bathing skills, all  -KA     [place] Level (Bathing Goal 1, OT-IRF) minimum assist (75% or more patient effort);moderate assist (50-74% patient effort)  -KA     Time Frame (Bathing Goal 1, OT-IRF) short-term goal (STG)  -KA     Progress/Outcomes (Bathing Goal 1, OT-IRF) goal ongoing  -KA     Row Name 09/10/22 1116          Bathing Goal 2 (OT-IRF)    Activity/Device (Bathing Goal 2, OT-IRF) bathing skills, all  -KA     [place] Level (Bathing Goal 2, OT-IRF) contact guard required;minimum assist (75% or more patient effort)  -KA     Time Frame (Bathing Goal 2, OT-IRF) long-term goal (LTG)  -KA     Progress/Outcomes (Bathing Goal 2, OT-IRF) goal ongoing  -CEFERINO     Row Name 09/10/22  1116          UB Dressing Goal 1 (OT-IRF)    Activity/Device (UB Dressing Goal 1, OT-IRF) upper body dressing  -KA     [place] (UB Dress Goal 1, OT-IRF) minimum assist (75% or more patient effort)  -KA     Time Frame (UB Dressing Goal 1, OT-IRF) short-term goal (STG)  -KA     Progress/Outcomes (UB Dressing Goal 1, OT-IRF) goal ongoing  -     Row Name 09/10/22 1116          UB Dressing Goal 2 (OT-IRF)    Activity/Device (UB Dressing Goal 2, OT-IRF) upper body dressing  -KA     [place] (UB Dress Goal 2, OT-IRF) standby assist  -KA     Time Frame (UB Dressing Goal 2, OT-IRF) long-term goal (LTG)  -KA     Progress/Outcomes (UB Dressing Goal 2, OT-IRF) goal ongoing  -     Row Name 09/10/22 1116          LB Dressing Goal 1 (OT-IRF)    Activity/Device (LB Dressing Goal 1, OT-IRF) lower body dressing  -KA     [place] (LB Dressing Goal 1, OT-IRF) moderate assist (50-74% patient effort);maximum assist (25-49% patient effort)  -KA     Time Frame (LB Dressing Goal 1, OT-IRF) short-term goal (STG)  -KA     Progress/Outcomes (LB Dressing Goal 1, OT-IRF) goal ongoing  -     Row Name 09/10/22 1116          LB Dressing Goal 2 (OT-IRF)    Activity/Device (LB Dressing Goal 2, OT-IRF) lower body dressing  -KA     [place] (LB Dressing Goal 2, OT-IRF) minimum assist (75% or more patient effort)  -KA     Time Frame (LB Dressing Goal 2, OT-IRF) long-term goal (LTG)  -KA     Progress/Outcomes (LB Dressing Goal 2, OT-IRF) goal ongoing  -     Row Name 09/10/22 1116          Grooming Goal 1 (OT-IRF)    Activity/Device (Grooming Goal 1, OT-IRF) grooming skills, all  -KA     [place] (Grooming Goal 1, OT-IRF) minimum assist (75% or more patient effort)  -KA     Time Frame (Grooming Goal 1, OT-IRF) short-term goal (STG)  -KA     Progress/Outcomes (Grooming Goal 1, OT-IRF) goal ongoing  -     Row Name 09/10/22 1116          Grooming Goal 2 (OT-IRF)    Activity/Device (Grooming Goal 2, OT-IRF) grooming skills, all   -KA     [place] (Grooming Goal 2, OT-IRF) set-up required  -KA     Time Frame (Grooming Goal 2, OT-IRF) long-term goal (LTG)  -KA     Progress/Outcomes (Grooming Goal 2, OT-IRF) goal ongoing  -KA     Row Name 09/10/22 1116          Toileting Goal 1 (OT-IRF)    Activity/Device (Toileting Goal 1, OT-IRF) toileting skills, all  -KA     [place] Level (Toileting Goal 1, OT-IRF) moderate assist (50-74% patient effort);maximum assist (25-49% patient effort)  -KA     Progress/Outcomes (Toileting Goal 1, OT-IRF) goal ongoing  -KA     Time Frame (Toileting Goal 1, OT-IRF) short-term goal (STG)  -KA     Row Name 09/10/22 1116          Toileting Goal 2 (OT-IRF)    Activity/Device (Toileting Goal 2, OT-IRF) toileting skills, all  -KA     [place] Level (Toileting Goal 2, OT-IRF) minimum assist (75% or more patient effort)  -KA     Progress/Outcomes (Toileting Goal 2, OT-IRF) goal ongoing  -KA     Time Frame (Toileting Goal 2, OT-IRF) long-term goal (LTG)  -KA     Row Name 09/10/22 1116          Strength Goal 1 (OT-IRF)    Strength Goal 1 (OT-IRF) Pt to increase MMT score to >3+/5 in order to increase independence with ADLs.  -KA     Time Frame (Strength Goal 1, OT-IRF) long-term goal (LTG)  -KA     Progress/Outcomes (Strength Goal 1, OT-IRF) goal ongoing  -KA     Row Name 09/10/22 1116           Activity Tolerance Goal 1 (OT)    Activity Tolerance Goal 1 (OT) Pt to improve activity tolerance to increase participation with ADLs.  -KA     Activity Level (Endurance Goal 1, OT) 15 min activity  -KA     Time Frame (Activity Tolerance Goal 1, OT) long term goal (LTG)  -KA     Progress/Outcome (Activity Tolerance Goal 1, OT) goal ongoing  -KA     Row Name 09/10/22 1116          Caregiver Training Goal 1 (OT-IRF)    Caregiver Training Goal 1 (OT-IRF) Pt's family to participate in family teaching to increase safety for discharge.  -KA     Time Frame (Caregiver Training Goal 1, OT-IRF) long-term goal (LTG)  -KA      Progress/Outcomes (Caregiver Training Goal 1, OT-IRF) goal ongoing  -           User Key  (r) = Recorded By, (t) = Taken By, (c) = Cosigned By    Initials Name Effective Dates    Bean Pina OT 02/22/22 -                          OT Recommendation and Plan                         Time Calculation:      Time Calculation- OT     Row Name 09/10/22 1220             Time Calculation- OT    OT Start Time 1030  -      OT Stop Time 1130  -      OT Time Calculation (min) 60 min  -      OT Received On 09/10/22  -CEFERINO      OT - Next Appointment 09/12/22  -CEFERINO      OT Goal Re-Cert Due Date 09/23/22  -            User Key  (r) = Recorded By, (t) = Taken By, (c) = Cosigned By    Initials Name Provider Type    Bean Pina OT Occupational Therapist              Therapy Charges for Today     Code Description Service Date Service Provider Modifiers Qty    [id number]  OT EVAL MOD COMPLEXITY 3 9/10/2022 Bean Perez OT GO 1    [id number]  OT SELF CARE/MGMT/TRAIN EA 15 MIN 9/10/2022 eBan Perez OT GO 2                   BEAN PEREZ OT  9/10/2022

## 2022-09-10 NOTE — CONSULTS
"Nutrition Services    Patient Name:  Summer Smith  YOB: 1950  MRN: [id number]  Admit Date:  9/9/2022      CLINICAL NUTRITION ASSESSMENT      Reason for Assessment Acute Rehab Admission     Admitting Diagnosis Dx: L MCA Aneurysm, multi focal bilat basilar pneumonia, diverticulitis with microperforation and small abscess s/p ex lap with bowel resection and colostomy.     Medical/Surgical History   Past Medical History:   Diagnosis Date   • COPD (chronic obstructive pulmonary disease) (HCC)    • Elevated cholesterol    • GERD (gastroesophageal reflux disease)        Past Surgical History:   Procedure Laterality Date   • CARDIAC CATHETERIZATION     • COLON SURGERY     • COLONOSCOPY     • SKIN BIOPSY          Nutritional Screen       Risk Screening Criteria Reduced oral intake over the last month   Malnutrition Screen Tool Score: 1     Encounter Information        Nutrition History/Narrative:  Pt was on TPN at previous hospital and had transitioned to a Soft Texture/ground diet w/ Boost Plus TID. Pt tolerating diet w/ 25-50% po intake at breakfast and lunch today. Pt's  fed her lunch d/t fatigue today. Pt only likes Boost when mixed w/ ice cream.   Food Preferences:    Supplements: Boost Plus TID, only likes Boost when mixed w/ ice cream   Factors Affecting Intake: decreased appetite, fatigue     Current Nutrition Orders & Evaluation of Intake       Oral Nutrition     Food Allergies NKFA   Current PO Diet Diet Soft Texture; Ground   Supplement Boost Plus TID   PO Evaluation     Trending % PO Intake 25-50% at breakfast and lunch today   --  Anthropometrics        Current Height  Current Weight  BMI kg/m2 Height: 177.8 cm (70\")  Weight: 115 kg (252 lb 10.4 oz) (09/09/22 2158)  Body mass index is 36.25 kg/m².       Ideal Body Weight (IBW) Weight: 115 kg (252 lb 10.4 oz)   Usual Body Weight (UBW) 254 lb   Weight Change/Trend No wt change       Weight History Wt Readings from Last 15 Encounters: "   09/09/22 2158 115 kg (252 lb 10.4 oz)      --  Physical Findings          Physical Assessment  Alert, oriented, obese, somnolent, [place], NC w/ O2   Edema  No edema   Gastrointestinal Rounded, non-distended, colostomy- last BM 9/9 formed   Tubes/Drains Colostomy LLQ   Oral/Mouth Cavity WNL   Skin R arm lac, L midline incision     Tests/Procedures/Labs        Tests/Procedures CT scan, X-Ray       Pertinent Labs           Invalid input(s): LABALBU, PROT          No results found for: COVID19  No results found for: HGBA1C     Medications           Scheduled Medications amLODIPine, 5 mg, Oral, Q24H  atorvastatin, 40 mg, Oral, Nightly  cholecalciferol, 2,000 Units, Oral, Daily  enoxaparin, 40 mg, Subcutaneous, Nightly  lisinopril, 10 mg, Oral, Q24H  pantoprazole, 40 mg, Oral, Q AM  sertraline, 25 mg, Oral, Daily       Infusions     PRN Medications •  albuterol  •  HYDROcodone-acetaminophen **OR** HYDROcodone-acetaminophen        Nutrition Diagnosis        Nutrition Dx Problem  Problem: Inadequate Intake/Infusion  Etiology: Factors Affecting Nutrition  Signs/Symptoms: PO intake    Comment: decreased appetite     Intervention Goal        Intervention Goal(s) Maintain nutrition status, Reduce/improve symptoms, Meet estimated needs, Disease management/therapy, Tolerate PO , Increase intake and Appropriate weight loss     Nutrition Intervention        RD Action Supplement provided, Encourage intake, Follow Tx Progress and Care plan reviewed     Recommendations        Diet Prescription Soft Texture/Ground diet    Supplement Prescription Boost Plus Milkshakes TID   EN Prescription    New Prescription Ordered? yes   --  Monitor/Evaluation        Monitor Per protocol   Education Will instruct as appropriate   --    RD to follow up per protocol.    Electronically signed by:  Sandra Gilliland RD  09/10/22 13:32 EDT

## 2022-09-10 NOTE — PROGRESS NOTES
SECTION GG    Mobility Performance:     Roll Left and Right: [place] does less than half the effort. [place] lifts, holds  or supports trunk or limbs but provides less than half the effort.   Sit to Lying: [place] does all of the effort. Patient does none of the effort to  complete the activity. Or, the assistance of 2 or more helpers is required for  the patient to complete the activity.   Lying to Sitting on Side of Bed: [place] does more than half the effort. [place]  lifts or holds trunk or limbs and provides more than half the effort.   Sit to Stand: [place] does all of the effort. Patient does none of the effort to  complete the activity. Or, the assistance of 2 or more helpers is required for  the patient to complete the activity.   Chair/Bed to Chair Transfer: [place] does all of the effort. Patient does none  of the effort to complete the activity. Or, the assistance of 2 or more helpers  is required for the patient to complete the activity.   Car Transfer: Not attempted due to medical or safety concerns.   Walk 10 Feet:   Not attempted due to medical or safety concerns.  1 Step Over Curb or Up/Down Stair:   Not attempted due to medical or safety  concerns.  Picking up an Object:   Not attempted due to medical or safety concerns.  Uses Wheelchair/Scooter: No    Mobility Discharge Goals:   Sit to Stand: [place] provides verbal cues or touching/steadying assistance as  patient completes activity.    Signed by: Rand Patel DPT

## 2022-09-10 NOTE — PLAN OF CARE
Goal Outcome Evaluation:  Plan of Care Reviewed With: patient        Progress: improving  Outcome Evaluation: Ms Smith arrived to rehab by [place] ambulance last night.  Daughters at bedside.  Pt c/o pain to abd.  Abd incision, colostomy, right elbow wound covered w/mepilex.  4L NC.  Purewick in place over night.  Did not ambulate

## 2022-09-10 NOTE — H&P
72 yo female was initially at [facility] and transferred to [facility] for L MCA aneurysm, multi focal bilat basilar pneumonia, diverticulitis with microperforation and small abscess s/p ex lap with bowel resection and colostomy. Is on a soft diet with supplements. Develop Afib. Required TPN. ECHO found moderately calcified aortic valve with moderate aortic stenosis. [place] did not do any intervention for aneurysm-  Plan outpt angiogram. Wound culture grew pseudomonas and staph hominis- flagyl and cipro are now finished. Prior to admission had been treated for UTI. Developed abd pain- that is what took her to hospital initially. Had arm laceration that required sutures.     ROS  No headache, no dizziness, blurry vision. Is on tube feeds. No chest pain, sob. Is not on 02 at home. Colostomy is working, they have not practiced changing yet. Used a purewick for bladder last night. No nausea. Does have lower ext edema. Rest neg    PMH-COPD- was not on 02  GERD  T/A surgery  Inner ear surgery  Tubal ligation    Soc Hx- Lives with . Single story home. Daughters can help- one is a nurse. Has a ramp.  + smoker, no etoh    NKMA    Fam history-heart disease    Meds-   Norvasc 5 mg daily  Lipitor 40 mg hs  Vit D3 2000 units daily  Zestril 10 mg daily  Protonix 40 mg daily  Zoloft 25 mg daily  Norco prn  Proventil prn      96.7 75 18 102/57  Awake, alert and 0x3  NAD  Speech a little soft, no aphasia/dysarthria  Heart rr no m/g/r  Lungs cta bilat- is on 02 per nc  abd- new colostomy with stool  Incision with slight drainage at distal end  Moving all 4 extremities to command- generalized weakness  2+ edema in ankles, a little tenderness    1.  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 Will teach family colostomy care. Pain controlled  3.  HTN- stable on norasc, zestril  4.  Depression - low dose zoloft  5.  DVT proph- was on  lovenox, no mention of stopping or continuing. Will continue 40 mg daily for now  Admit for comprehensive rehab

## 2022-09-10 NOTE — PROGRESS NOTES
SECTION GG    Mobility Performance:     Roll Left and Right: Black does less than half the effort. Black lifts, holds  or supports trunk or limbs but provides less than half the effort.   Sit to Lying: Black does all of the effort. Patient does none of the effort to  complete the activity. Or, the assistance of 2 or more helpers is required for  the patient to complete the activity.   Lying to Sitting on Side of Bed: [place] does more than half the effort. Black  lifts or holds trunk or limbs and provides more than half the effort.   Sit to Stand: [place] does all of the effort. Patient does none of the effort to  complete the activity. Or, the assistance of 2 or more helpers is required for  the patient to complete the activity.   Chair/Bed to Chair Transfer: [place] does all of the effort. Patient does none  of the effort to complete the activity. Or, the assistance of 2 or more helpers  is required for the patient to complete the activity.   Car Transfer: Not attempted due to medical or safety concerns.   Walk 10 Feet:   Not attempted due to medical or safety concerns.  1 Step Over Curb or Up/Down Stair:   Not attempted due to medical or safety  concerns.  Picking up an Object:   Not attempted due to medical or safety concerns.  Uses Wheelchair/Scooter: Yes  Wheel 50 Feet with Two Turns: [place] does more than half the effort. Black  lifts or holds trunk or limbs and provides more than half the effort.  Type of Wheelchair or Scooter Used: Manual  Wheel 150 Feet: Black does more than half the effort. Black lifts or holds  trunk or limbs and provides more than half the effort.  Type of Wheelchair/Scooter Used: Manual    Mobility Discharge Goals:   Sit to Stand: Black provides verbal cues or touching/steadying assistance as  patient completes activity.    Signed by: Rand Patel DPT

## 2022-09-10 NOTE — PROGRESS NOTES
Inpatient Rehabilitation Plan of Care Note    Plan of Care  Care Plan Reviewed - No updates at this time.    Pain    Performed Intervention(s)  Pain medication as requested and available  Repositioning and wound care      Sphincter Control    Performed Intervention(s)  Incontinance products and elo care  answer call light promptly  WCON consult  skin/stoma assessment      Psychosocial    Performed Intervention(s)  supportive family  Pt given opportunity to express concerns/feelings      Safety    Performed Intervention(s)  Personal items and call light w/in reach  falls precaution  hourly rounding    Signed by: Myrna Hernandez RN

## 2022-09-10 NOTE — PROGRESS NOTES
SECTION GG    Self Care Performance:   Oral Hygiene: [place] does more than half the effort. [place] lifts or holds  trunk or limbs and provides more than half the effort.   Toileting Hygiene: [place] does all of the effort. Patient does none of the  effort to complete the activity. Or, the assistance of 2 or more helpers is  required for the patient to complete the activity.   Shower/Bathe Self: [place] does more than half the effort. [place] lifts or holds  trunk or limbs and provides more than half the effort.   Upper Body Dressing: [place] does less than half the effort. [place] lifts, holds  or supports trunk or limbs but provides less than half the effort.   Lower Body Dressing: [place] does all of the effort. Patient does none of the  effort to complete the activity. Or, the assistance of 2 or more helpers is  required for the patient to complete the activity.   Putting On/Taking Off Footwear: [place] does all of the effort. Patient does  none of the effort to complete the activity. Or, the assistance of 2 or more  helpers is required for the patient to complete the activity.    Self Care Discharge Goals:   Oral Hygiene: [place] provides verbal cues or touching/steadying assistance as  patient completes activity.   Toileting Hygiene: [place] does less than half the effort. [place] lifts, holds  or supports trunk or limbs but provides less than half the effort.   Shower/Bathe Self: [place] does less than half the effort. [place] lifts, holds  or supports trunk or limbs but provides less than half the effort.   Upper Body Dressing: [place] provides verbal cues or touching/steadying  assistance as patient completes activity.   Lower Body Dressing: [place] does less than half the effort. [place] lifts, holds  or supports trunk or limbs but provides less than half the effort.   Putting On/Taking Off Footwear: [place] does less than half the effort. [place]  lifts, holds or supports trunk or limbs but provides less than half the  effort.    Mobility Toilet Transfer Performance: [place] does more than half the effort.  [place] lifts or holds trunk or limbs and provides more than half the effort.    Mobility Toilet Transfer Discharge Goal: [place] does less than half the effort.  [place] lifts, holds or supports trunk or limbs but provides less than half the  effort.    Signed by: Marina Perez, OT

## 2022-09-10 NOTE — THERAPY EVALUATION
Inpatient Rehabilitation - Speech Language Pathology Initial Evaluation    [facility]     Patient Name: Summer Smith  : 1950  MRN: [id number]    Today's Date: 9/10/2022                   Admit Date: 2022       Visit Dx:      ICD-10-CM ICD-9-CM   1. Follow-up exam  Z09 V67.9       Patient Active Problem List   Diagnosis   • Aneurysm (HCC)       Past Medical History:   Diagnosis Date   • COPD (chronic obstructive pulmonary disease) (HCC)    • Elevated cholesterol    • GERD (gastroesophageal reflux disease)        Past Surgical History:   Procedure Laterality Date   • CARDIAC CATHETERIZATION     • COLON SURGERY     • COLONOSCOPY     • SKIN BIOPSY         SLP Recommendation and Plan    SLP Diagnosis: severe cognitive deficits. (09/10/22 1030)     Rehab Potential/Prognosis: good (09/10/22 1030)     Anticipated Discharge Disposition (SLP): unknown (09/10/22 1030)     Therapy Frequency (Swallow): 5 days per week (09/10/22 08)  Predicted Duration Therapy Intervention (Days): until discharge (09/10/22 1030)                         SLP EVALUATION (last 72 hours)     SLP SLC Evaluation     Row Name 09/10/22 1030                   Communication Assessment/Intervention    Document Type evaluation  -LS        Patient Observations lethargic;agree to therapy  -LS        Patient Effort good  -LS                  General Information    Patient Profile Reviewed yes  -LS        Pertinent History Of Current Problem Pt is a 71 year old female with PMH of hyperlipidemia and GERD, was admitted to [facility] for perforated diverticulum with paradiverticular absess with associated sepsis, status post ex lap and resection POD #3 Pt admitted to this rehab today at 03:44.  by Geovany Porras MS CCC-SLP at 9/10/22 08  -LS        Precautions/Limitations, Vision WFL  -LS        Precautions/Limitations, Hearing WFL  -LS        Patient Level of Education High school.  -LS        Prior Level of Function-Communication  cognitive-linguistic impairment  -        Plans/Goals Discussed with patient  -        Standardized Assessment Used SLUMS  Pt scored 13/30 on the SLUMS indicating a very decreased neurocognitive impairment.  -LS                  Cognitive Assessment Intervention- SLP    Cognitive Function (Cognition) moderate impairment  -LS        Orientation Status (Cognition) awareness of basic personal information  -        Memory (Cognitive) mod-complex  -LS        Functional Math (Cognitive) simple;severe impairment  -                  Standardized Tests    Cognitive/Memory Tests SLUMS: [facility] Mental Status Examination  Left side neglect observed with clock. Also number sequencing severley decreased.  -LS                  SLUMS: [facility] Mental Status Examination    SLUMS Score 13  -        SLUMS Range 1-20: Dementia (High school education or higher)  -        SLUMS Comments Pt was lethargic during eval.  Pt also exhibited a left side neglect as evoidenced with the numbers on the clock.  Number sequencing was also severely decreased.  -LS                  SLP Evaluation Clinical Impressions    SLP Diagnosis severe cognitive deficits.  -LS        Rehab Potential/Prognosis good  -        SLC Criteria for Skilled Therapy Interventions Met yes  -LS        Functional Impact needs 24 hour supervision  -LS                  Recommendations    Therapy Frequency (SLP SLC) 5 days per week  -        Predicted Duration Therapy Intervention (Days) until discharge  -LS        Anticipated Discharge Disposition (SLP) unknown  -LS                  Communication Treatment Objective and Progress Goals (SLP)    Auditory Comprehension Treatment Objectives Auditory Comprehension Treatment Objectives (Group)  -LS        Cognitive Linguistic Treatment Objectives Cognitive Linguistic Treatment Objectives (Group)  -LS                  Auditory Comprehension Treatment Objectives    Follow Directions Selection  follow directions, SLP goal 1  -LS                  Follow Directions Goal 2 (SLP)    Progress (Ability to Follow Directions Goal 1, SLP) 50%  -LS                  Cognitive Linguistic Treatment Objectives    Orientation Selection orientation, SLP goal 1  -LS        Problem Solving Selection problem solving, SLP goal 1  -LS                  Orientation Goal 1 (SLP)    Improve Orientation Through Goal 1 (SLP) use environmental aids to assist with orientation;60%  -LS        Progress (Orientation Goal 1, SLP) 50%  -LS                  Memory Skills Goal (SLP)    Time Frame (Memory Skills Goal, SLP) 2 weeks  -LS        Progress (Memory Skills Goal, SLP) 50%  -LS              User Key  (r) = Recorded By, (t) = Taken By, (c) = Cosigned By    Initials Name Effective Dates    LS Geovany Porras, MS CCC-SLP 06/16/21 -                    EDUCATION    The patient has been educated in the following areas:       Cognitive Impairment.             SLP GOALS     Row Name 09/10/22 1030 09/10/22 0800          (LTG) Patient will demonstrate functional swallow for    Price (Demonstrate functional swallow) -- independently (over 90% accuracy)  -LS            (LTG) Patient will demonstrate progress toward functional swallow for    Diet Texture (Demonstrate progress toward functional swallow) -- soft ground textures  -LS     Liquid viscosity (Demonstrate progress toward functional swallow) -- thin liquids  -LS     Price (Demonstrate progress towards functional swallow) -- with moderate cues (50-74% accuracy)  -LS            Follow Directions Goal 2 (SLP)    Progress (Ability to Follow Directions Goal 1, SLP) 50%  -LS --            Orientation Goal 1 (SLP)    Improve Orientation Through Goal 1 (SLP) use environmental aids to assist with orientation;60%  -LS --     Progress (Orientation Goal 1, SLP) 50%  -LS --            Memory Skills Goal (SLP)    Time Frame (Memory Skills Goal, SLP) 2 weeks  -LS --     Progress (Memory  Skills Goal, SLP) 50%  -LS --            Functional Problem Solving Skills Goal 1 (SLP)    Improve Problem Solving Through Goal 1 (SLP) complete organization/home management task  -LS --     Progress (Problem Solving Goal 1, SLP) 50%  -LS --           User Key  (r) = Recorded By, (t) = Taken By, (c) = Cosigned By    Initials Name Provider Type    Geovany Tolliver MS CCC-SLP Speech and Language Pathologist                SLP Outcome Measures (last 72 hours)     SLP Outcome Measures     Row Name 09/10/22 1200             SLP Outcome Measures    Outcome Measure Used? Adult NOMS  -LS              Adult FCM Scores    FCM Chosen Swallowing  -LS      Swallowing FCM Score 4  -LS            User Key  (r) = Recorded By, (t) = Taken By, (c) = Cosigned By    Initials Name Effective Dates    LS Geovany Porras MS CCC-SLP 06/16/21 -                         Time Calculation:        Time Calculation- SLP     Row Name 09/10/22 1201             Time Calculation- SLP    SLP Start Time 0800  -      SLP Stop Time 0830  -      SLP Time Calculation (min) 30 min  -LS            User Key  (r) = Recorded By, (t) = Taken By, (c) = Cosigned By    Initials Name Provider Type    Geovany Tolliver MS CCC-SLP Speech and Language Pathologist                  Therapy Charges for Today     Code Description Service Date Service Provider Modifiers Qty    15158732366 HC ST EVAL ORAL PHARYNG SWALLOW 2 9/10/2022 Geovany Porras MS CCC-SLP GN 1    [id number] HC ST EVAL SPEECH SOUND PRODUCTION 4 9/10/2022 Geovany Porras MS CCC-SLP GN 1            ADULT NOMS (last 72 hours)     Adult NOMS     Row Name 09/10/22 1200                   Adult FCM Scores    FCM Chosen Swallowing  -LS        Swallowing FCM Score 4  -LS              User Key  (r) = Recorded By, (t) = Taken By, (c) = Cosigned By    Initials Name Effective Dates    Geovany Tolliver MS CCC-SLP 06/16/21 -                            Geovany Porras MS CCC-SLP  9/10/2022

## 2022-09-10 NOTE — PLAN OF CARE
Problem: Rehabilitation (IRF) Plan of Care  Goal: Plan of Care Review  9/10/2022 1259 by Geovany Porras MS CCC-SLP  Outcome: Ongoing, Progressing   Goal Outcome Evaluation:              Speech language evaluation completed this date. SLP utilized the SLUMS to assess pt's cognitive linguistic status. Pt was lethargic. Areas of weakness include simple mental math, memory and sequencing.  Pt exhibited a left neglect as evidenced by the clock drawing in which all her numbers were placed on the right side of the clock. The numbers were not sequenced well. It is recommended pt be seen in Rehab to address the above deficits.

## 2022-09-10 NOTE — PROGRESS NOTES
Inpatient Rehabilitation Functional Measures Assessment and Plan of Care    Plan of Care  Updated Problems/Interventions  Mobility    [OT] Toilet Transfers(Active)  Current Status(09/10/2022): mod Ax2  Weekly Goal(09/17/2022): min Ax2  Discharge Goal: min A    [OT] Tub/Shower Transfers(Active)  Current Status(09/10/2022): Mod Ax2  Weekly Goal(09/17/2022): min Ax2  Discharge Goal: Min A        Self Care    [OT] Bathing(Active)  Current Status(09/10/2022): max A  Weekly Goal(09/17/2022): mod A  Discharge Goal: min A    [OT] Dressing (Lower)(Active)  Current Status(09/10/2022): Dep  Weekly Goal(09/17/2022): max A  Discharge Goal: min A    [OT] Dressing (Upper)(Active)  Current Status(09/10/2022): mod A  Weekly Goal(09/17/2022): min A  Discharge Goal: set up    [OT] Eating(Active)  Current Status(09/10/2022): min A  Weekly Goal(09/17/2022): SBA  Discharge Goal: set up    [OT] Grooming(Active)  Current Status(09/10/2022): mod A  Weekly Goal(09/17/2022): min A  Discharge Goal: set up    [OT] Toileting(Active)  Current Status(09/10/2022): Dep  Weekly Goal(09/17/2022): max A  Discharge Goal: min A    Functional Measures  NIKKI Eating:  Branch  NIKKI Grooming: Branch  NIKKI Bathing:  Branch  NIKKI Upper Body Dressing:  Branch  NIKKI Lower Body Dressing:  Branch  NIKKI Toileting:  Branch    NIKKI Bladder Management  Level of Assistance:  Branch  Frequency/Number of Accidents this Shift:  Branch    NIKKI Bowel Management  Level of Assistance: Branch  Frequency/Number of Accidents this Shift: Branch    NIKKI Bed/Chair/Wheelchair Transfer:  Branch  NIKKI Toilet Transfer:  Branch  NIKKI Tub/Shower Transfer:  Branch    Previously Documented Mode of Locomotion at Discharge: Field  NIKKI Expected Mode of Locomotion at Discharge: Branch  NIKKI Walk/Wheelchair:  Branch  NIKKI Stairs:  Branch    NIKKI Comprehension:  Branch  NIKKI Expression:  Branch  NIKKI Social Interaction:  Branch  NIKKI Problem Solving:  Branch  NIKKI Memory:  Branch    Therapy Mode  Minutes  Occupational Therapy: Branch  Physical Therapy: Branch  Speech Language Pathology:  Branch    Signed by: Marina Perez OT

## 2022-09-10 NOTE — PROGRESS NOTES
Inpatient Rehabilitation Plan of Care Note    Plan of Care  Care Plan Reviewed - No updates at this time.    Pain    [RN] Pain Management(Active)  Current Status(09/10/2022): Pain currently not at tolerable rate  Weekly Goal(09/17/2022): Pain will be managed at tolerable rate  Discharge Goal: Patient will be able to tolerate pain and manage        Psychosocial    [RN] Coping/Adjustment(Active)  Current Status(09/10/2022): Pt is adjusting to bowel resection/colostomy  Weekly Goal(09/17/2022): Patient adjusted to new bowel regimen  Discharge Goal: Patient coping well with new colostomy        Safety    [RN] Potential for Injury(Active)  Current Status(09/10/2022): Patient at risk for falls due to weakness from  abdominal surger  Weekly Goal(09/17/2022): Patient will use call light appropriately  Discharge Goal: Patient will be aware of risk of falls        Sphincter Control    [RN] Bladder Management(Active)  Current Status(09/10/2022): Patient using external catheter for bedtime  Weekly Goal(09/17/2022): Pt continent of bladder  Discharge Goal: Pt continent 100%    [RN] Bowel Management(Active)  Current Status(09/10/2022): Patient has a new colostomy  Weekly Goal(09/17/2022): Patient will maintain skin integrity around stoma  Discharge Goal: Patient will demonstrate techniques to maintain skin integrity  and change appliance independently    Signed by: Massimo Lopez RN

## 2022-09-10 NOTE — PROGRESS NOTES
SECTION GG    Eating Performance: [place] provides verbal cues or touching/steadying assistance  as patient completes activity.    Eating Discharge Goals: Patient completed the activities by him/herself with no  assistance from a helper.    Signed by: Massimo Lopez RN

## 2022-09-10 NOTE — PLAN OF CARE
Goal Outcome Evaluation:  Plan of Care Reviewed With: patient           Outcome Evaluation: Summer has been alert and oriented x4 with quiet voice, assist x2 with weak LE -bilaterally, and medication with water. She wears O2 at 4L via NC, needs assistance with meals, and colostomy in place-draining properly. She is using BSC with max assistance, purewick at night, and colostomy supplies at bedside, VS stable, no pain medication, and incision to abdomen draining slightly at distal end- MD aware.

## 2022-09-10 NOTE — PLAN OF CARE
"  Problem: Rehabilitation (IRF) Plan of Care  Goal: Plan of Care Review  Outcome: Ongoing, Progressing   Goal Outcome Evaluation:            Bedside evaluation completed.  No difficulty noted with the thin liquids. Pt does not eat a lot and states she becomes tired while eating. Pt exhibited a weak rotary chew which could be attributed to overall  generalized weakness. SLP discussed with Pt and daughter the possibility of adding \"boost\" between meals. Pt was very opposed to this suggestion as she stated she did not like the taste of Boost.  SLP added ice cream to the boost to make a milk shake.  Pt was very receptive and had 6 teaspoons before stating she was full.   SLP recommends pt remain on current diet of mechanical soft with thin liquids. Assess at bedside for possible diet upgrade when pt is less lethargic. Recommends medication whole with water.  Upright at 90 degrees for all meals and 30 minutes after. Oral care before and after meals.     "

## 2022-09-10 NOTE — PROGRESS NOTES
SECTION GG    Eating Performance: [place] sets up or cleans up; patient completes activity.  [place] assists only prior to or following the activity.    Eating Discharge Goals: Patient completed the activities by him/herself with no  assistance from a helper.    Signed by: Myrna Hernandez RN

## 2022-09-11 PROCEDURE — 25010000002 ENOXAPARIN PER 10 MG: Performed by: PHYSICAL MEDICINE & REHABILITATION

## 2022-09-11 RX ADMIN — HYDROCODONE BITARTRATE AND ACETAMINOPHEN 2 TABLET: 5; 325 TABLET ORAL at 12:22

## 2022-09-11 RX ADMIN — PANTOPRAZOLE SODIUM 40 MG: 40 TABLET, DELAYED RELEASE ORAL at 06:14

## 2022-09-11 RX ADMIN — ATORVASTATIN CALCIUM 40 MG: 20 TABLET, FILM COATED ORAL at 20:31

## 2022-09-11 RX ADMIN — SERTRALINE 25 MG: 25 TABLET, FILM COATED ORAL at 09:03

## 2022-09-11 RX ADMIN — ENOXAPARIN SODIUM 40 MG: 100 INJECTION SUBCUTANEOUS at 20:31

## 2022-09-11 RX ADMIN — Medication 2000 UNITS: at 09:04

## 2022-09-11 RX ADMIN — HYDROCODONE BITARTRATE AND ACETAMINOPHEN 1 TABLET: 5; 325 TABLET ORAL at 20:31

## 2022-09-11 RX ADMIN — HYDROCODONE BITARTRATE AND ACETAMINOPHEN 1 TABLET: 5; 325 TABLET ORAL at 00:09

## 2022-09-11 NOTE — PROGRESS NOTES
Inpatient Rehabilitation Plan of Care Note    Plan of Care  Care Plan Reviewed - No updates at this time.    Pain    Performed Intervention(s)  Pain medication as requested and available  Repositioning and wound care      Sphincter Control    Performed Intervention(s)  Incontinance products and elo care  answer call light promptly  WCON consult  skin/stoma assessment      Psychosocial    Performed Intervention(s)  supportive family  Pt given opportunity to express concerns/feelings      Safety    Performed Intervention(s)  Personal items and call light w/in reach  falls precaution  hourly rounding    Signed by: Salma Raza RN

## 2022-09-11 NOTE — PLAN OF CARE
Goal Outcome Evaluation:  Plan of Care Reviewed With: patient, spouse           Outcome Evaluation: Summer has been alert and oriented x4 with quiet voice, assist x2 with weak LE -bilaterally, and medication with water. She wears O2 at 4L via NC, needs assistance with meals, and colostomy replaced today related to leaking . She is using BSC with max assistance, purewick at night, and colostomy supplies at bedside. VS stable- BP low so BP meds held, pain medication x1, and incision to abdomen with sutures, draining slightly.

## 2022-09-11 NOTE — PROGRESS NOTES
Tired after being up in chair and getting to bed side commode.   Has some pain in coccyx  Has slight drainage from incision  No chest pain, sob. Does have some abd pain. Is not wanting to take pain meds    97.4 62 18 92/72    Awake, alert and 0x3  NAD  Speech stronger today  Heart rr no m/g/r  Lungs cta bilat- is on 02 per nc  abd- new colostomy with stool  Incision with slight drainage at distal end  Moving all 4 extremities to command- generalized weakness  2+ edema in ankles, a little tenderness    1.  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 Will teach family colostomy care. Pain controlled. Encouraged to at least take pain meds prior to therapy  3.  HTN- stable on norvasc, zestril. Running low today. May need to adjust. Will watch trend  4.  Depression - low dose zoloft  5.  DVT proph- was on lovenox, no mention of stopping or continuing. Will continue 40 mg daily for now

## 2022-09-11 NOTE — PLAN OF CARE
Goal Outcome Evaluation:        Slept off & on. Family member at bedside. Turned patient. Pure wick for bladder incontinence during the night. Norco given for Abd. Pain, with some relief. Incision open to air, healing. O2 4L/NC, no resp. Distress.

## 2022-09-12 PROCEDURE — 97116 GAIT TRAINING THERAPY: CPT

## 2022-09-12 PROCEDURE — 97530 THERAPEUTIC ACTIVITIES: CPT

## 2022-09-12 PROCEDURE — 97535 SELF CARE MNGMENT TRAINING: CPT

## 2022-09-12 PROCEDURE — 97110 THERAPEUTIC EXERCISES: CPT

## 2022-09-12 PROCEDURE — 96125 COGNITIVE TEST BY HC PRO: CPT

## 2022-09-12 PROCEDURE — 25010000002 ENOXAPARIN PER 10 MG: Performed by: PHYSICAL MEDICINE & REHABILITATION

## 2022-09-12 RX ORDER — PANTOPRAZOLE SODIUM 20 MG/1
20 TABLET, DELAYED RELEASE ORAL DAILY
COMMUNITY

## 2022-09-12 RX ORDER — ACETAMINOPHEN 325 MG/1
650 TABLET ORAL EVERY 6 HOURS PRN
Status: DISCONTINUED | OUTPATIENT
Start: 2022-09-12 | End: 2022-09-28 | Stop reason: HOSPADM

## 2022-09-12 RX ORDER — ALBUTEROL SULFATE 90 UG/1
2 AEROSOL, METERED RESPIRATORY (INHALATION) EVERY 4 HOURS PRN
COMMUNITY

## 2022-09-12 RX ORDER — LISINOPRIL 5 MG/1
5 TABLET ORAL
Status: DISCONTINUED | OUTPATIENT
Start: 2022-09-13 | End: 2022-09-28 | Stop reason: HOSPADM

## 2022-09-12 RX ORDER — SIMVASTATIN 40 MG
40 TABLET ORAL NIGHTLY
COMMUNITY
End: 2022-09-28 | Stop reason: HOSPADM

## 2022-09-12 RX ORDER — SERTRALINE HYDROCHLORIDE 25 MG/1
25 TABLET, FILM COATED ORAL DAILY
COMMUNITY

## 2022-09-12 RX ORDER — ACETAMINOPHEN 160 MG
2000 TABLET,DISINTEGRATING ORAL DAILY
COMMUNITY

## 2022-09-12 RX ADMIN — ENOXAPARIN SODIUM 40 MG: 100 INJECTION SUBCUTANEOUS at 21:27

## 2022-09-12 RX ADMIN — HYDROCODONE BITARTRATE AND ACETAMINOPHEN 2 TABLET: 5; 325 TABLET ORAL at 08:28

## 2022-09-12 RX ADMIN — HYDROCODONE BITARTRATE AND ACETAMINOPHEN 2 TABLET: 5; 325 TABLET ORAL at 12:14

## 2022-09-12 RX ADMIN — LISINOPRIL 10 MG: 10 TABLET ORAL at 08:27

## 2022-09-12 RX ADMIN — ATORVASTATIN CALCIUM 40 MG: 20 TABLET, FILM COATED ORAL at 21:26

## 2022-09-12 RX ADMIN — HYDROCODONE BITARTRATE AND ACETAMINOPHEN 1 TABLET: 5; 325 TABLET ORAL at 21:26

## 2022-09-12 RX ADMIN — AMLODIPINE BESYLATE 5 MG: 5 TABLET ORAL at 08:27

## 2022-09-12 RX ADMIN — PANTOPRAZOLE SODIUM 40 MG: 40 TABLET, DELAYED RELEASE ORAL at 07:00

## 2022-09-12 RX ADMIN — SERTRALINE 25 MG: 25 TABLET, FILM COATED ORAL at 08:27

## 2022-09-12 RX ADMIN — Medication 2000 UNITS: at 08:27

## 2022-09-12 NOTE — THERAPY TREATMENT NOTE
Inpatient Rehabilitation - Physical Therapy Treatment Note       [facility]     Patient Name: Summer Smith  : 1950  MRN: [id number]    Today's Date: 2022                    Admit Date: 2022      Visit Dx:     ICD-10-CM ICD-9-CM   1. Follow-up exam  Z09 V67.9       Patient Active Problem List   Diagnosis   • Aneurysm (HCC)       Past Medical History:   Diagnosis Date   • COPD (chronic obstructive pulmonary disease) (HCC)    • Elevated cholesterol    • GERD (gastroesophageal reflux disease)        Past Surgical History:   Procedure Laterality Date   • CARDIAC CATHETERIZATION     • COLON SURGERY     • COLONOSCOPY     • SKIN BIOPSY         PT ASSESSMENT (last 12 hours)     IRF PT Evaluation and Treatment     Row Name 22 1113          PT Time and Intention    Document Type daily treatment  -MD     Mode of Treatment physical therapy  -MD     Patient/Family/Caregiver Comments/Observations Pt sitting in WC showing no signs of acute distress.  -MD     Row Name 22 1113          Cognition/Psychosocial    Orientation Status (Cognition) oriented to;person  -MD     Follows Commands (Cognition) follows one-step commands;over 90% accuracy;increased processing time needed;repetition of directions required;verbal cues/prompting required  -MD     Personal Safety Interventions fall prevention program maintained;gait belt  -MD     Row Name 22 1113          Transfers    Sit-Stand [place] (Transfers) verbal cues;minimum assist (75% patient effort);2 person assist  -MD     Stand-Sit [place] (Transfers) verbal cues;minimum assist (75% patient effort)  -MD     Row Name 22 1113          Sit-Stand Transfer    Assistive Device (Sit-Stand Transfers) parallel bars;wheelchair  -MD     Row Name 22 1113          Stand-Sit Transfer    Assistive Device (Stand-Sit Transfers) wheelchair  -MD     Row Name 22 1113          Gait/Stairs (Locomotion)    [place] Level (Gait) verbal  cues;moderate assist (50% patient effort)  -MD     Assistive Device (Gait) parallel bars  -MD     Distance in Feet (Gait) 8'x3  -MD     Pattern (Gait) step-through  -MD     Deviations/Abnormal Patterns (Gait) base of support, narrow;festinating/shuffling;hunter decreased;gait speed decreased;stride length decreased  -MD     Bilateral Gait Deviations forward flexed posture;foot drop/toe drag  -MD     Row Name 09/12/22 1113          Motor Skills    Therapeutic Exercise ankle  -MD     Row Name 09/12/22 1113          Hip (Therapeutic Exercise)    Hip Strengthening (Therapeutic Exercise) bilateral;aBduction;aDduction;marching while seated;10 repetitions;2 sets  -MD     Row Name 09/12/22 1113          Knee (Therapeutic Exercise)    Knee Strengthening (Therapeutic Exercise) bilateral;LAQ (long arc quad);hamstring curls;10 repetitions;2 sets  -MD     Row Name 09/12/22 1113          Ankle (Therapeutic Exercise)    Ankle (Therapeutic Exercise) strengthening exercise  -MD     Ankle Strengthening (Therapeutic Exercise) bilateral;dorsiflexion;plantarflexion;10 repetitions;2 sets  -MD     Row Name 09/12/22 1113          Positioning and Restraints    Pre-Treatment Position sitting in chair/recliner  -MD     Post Treatment Position wheelchair  -MD     In Wheelchair with OT  -MD           User Key  (r) = Recorded By, (t) = Taken By, (c) = Cosigned By    Initials Name Provider Type    Ale Regalado, PT Physical Therapist              Wound 09/09/22 2200 Right upper arm Laceration (Active)   Dressing Appearance dry;intact 09/12/22 0828   Closure Sutures 09/12/22 0828   Base dressing in place, unable to visualize 09/12/22 0828   Drainage Amount none 09/12/22 0828   Dressing Care foam;dressing changed 09/12/22 0828       Wound 09/09/22 2200 Left midline abdomen Incision (Active)   Dressing Appearance open to air 09/12/22 0828   Closure Adhesive closure strips 09/12/22 0828   Drainage Amount scant 09/12/22 0828   Care, Wound cleansed  with;soap and water 09/12/22 0828   Dressing Care gauze, dry 09/12/22 0828     Physical Therapy Education                 Title: PT OT SLP Therapies (Done)     Topic: Physical Therapy (Done)     Point: Mobility training (Done)     Learning Progress Summary           Patient Acceptance, E,TB, VU,NR by EE at 9/10/2022 1518                   Point: Home exercise program (Done)     Learning Progress Summary           Patient Acceptance, E,TB, VU,NR by EE at 9/10/2022 1518                   Point: Body mechanics (Done)     Learning Progress Summary           Patient Acceptance, E,TB, VU,NR by EE at 9/10/2022 1518                   Point: Precautions (Done)     Learning Progress Summary           Patient Acceptance, E, VU by MD at 9/12/2022 1114    Acceptance, E,TB, VU,NR by EE at 9/10/2022 1518                               User Key     Initials Effective Dates Name Provider Type Discipline    EE 06/16/21 -  Rand Patel, PT Physical Therapist PT    MD 06/16/21 -  Ale Garza PT Physical Therapist PT                PT Recommendation and Plan                          Time Calculation:      PT Charges     Row Name 09/12/22 1346 09/12/22 1112          Time Calculation    Start Time 1330  -MD 1100  -MD     Stop Time 1400  -MD 1130  -MD     Time Calculation (min) 30 min  -MD 30 min  -MD     PT Received On -- 09/12/22  -MD     PT - Next Appointment -- 09/13/22  -MD           User Key  (r) = Recorded By, (t) = Taken By, (c) = Cosigned By    Initials Name Provider Type    Ale Regalado, MARIBEL Physical Therapist                Therapy Charges for Today     Code Description Service Date Service Provider Modifiers Qty    73006780052 HC PT THER PROC EA 15 MIN 9/12/2022 Ale Garza, PT GP 2    18090671794 HC GAIT TRAINING EA 15 MIN 9/12/2022 Ale Garza, PT GP 2    06907666796 HC PT THER SUPP EA 15 MIN 9/12/2022 Ale Garza, PT GP 2              Patient was wearing a face mask during this therapy encounter. Therapist used appropriate  personal protective equipment including mask and gloves.  Mask used was standard procedure mask. Appropriate PPE was worn during the entire therapy session. Hand hygiene was completed before and after therapy session. Patient is not in enhanced droplet precautions.           Ale Garza, PT  9/12/2022

## 2022-09-12 NOTE — PLAN OF CARE
Goal Outcome Evaluation:   A&Ox4. Cooperative. Meds whole with water. Needs assistance with feeding. Ax2 with transfers. Cont bladder during day, wears Purewik at night. Colostomy in place. Supportive  at bedside. Slept well from [id number], then awake. Having trouble releasing bladder. Encouragement given. No unsafe behavior.

## 2022-09-12 NOTE — THERAPY TREATMENT NOTE
Inpatient Rehabilitation - Occupational Therapy Treatment Note    [facility]     Patient Name: Summer Smith  : 1950  MRN: [id number]    Today's Date: 2022                 Admit Date: 2022         ICD-10-CM ICD-9-CM   1. Follow-up exam  Z09 V67.9       Patient Active Problem List   Diagnosis   • Aneurysm (HCC)       Past Medical History:   Diagnosis Date   • COPD (chronic obstructive pulmonary disease) (HCC)    • Elevated cholesterol    • GERD (gastroesophageal reflux disease)        Past Surgical History:   Procedure Laterality Date   • CARDIAC CATHETERIZATION     • COLON SURGERY     • COLONOSCOPY     • SKIN BIOPSY               IRF OT ASSESSMENT FLOWSHEET (last 12 hours)     IRF OT Evaluation and Treatment     Row Name 22 1518 22 1157       OT Time and Intention    Document Type daily treatment  -KA progress note  -DN    Mode of Treatment individual therapy;occupational therapy  -KA occupational therapy  -DN    Patient Effort good  -KA good  -DN    Symptoms Noted During/After Treatment fatigue;increased pain  -KA fatigue;increased pain  -DN    Row Name 22 1518          General Information    Patient/Family/Caregiver Comments/Observations Pt sitting up in wc in therapy gym.  -KA     Existing Precautions/Restrictions fall;oxygen therapy device and L/min  colostomy, 4 L O2 nc  -KA     Limitations/Impairments safety/cognitive  -KA     Row Name 22 1518 22 1157       Pain Assessment    Pretreatment Pain Rating 5/10  -KA 5/10  -DN    Posttreatment Pain Rating 5/10  -KA 5/10  -DN    Pain Location incisional  -KA incisional  -DN    Pain Location - abdomen  -KA abdomen  -DN    Row Name 22 1518          Cognition/Psychosocial    Affect/Mental Status (Cognition) flat/blunted affect  -KA     Orientation Status (Cognition) oriented to;person  -KA     Follows Commands (Cognition) follows one-step commands;over 90% accuracy;increased processing time needed;repetition of  directions required;verbal cues/prompting required  -KA     Personal Safety Interventions fall prevention program maintained;gait belt;nonskid shoes/slippers when out of bed  -     Row Name 09/12/22 1518          Range of Motion (ROM)    Range of Motion ROM is WFL  -KA     Left Upper Extremity (ROM) left UE ROM is WFL  -KA     Right Upper Extremity (ROM) right UE ROM is WFL  -KA     Row Name 09/12/22 1518          Strength (Manual Muscle Testing)    Left Hand, Setting 2 (Dynamometer Testing) 25  -KA     Right Hand, Setting 2 (Dynamometer Testing) 20  -KA     Left Hand: Lateral (Key) Pinch Strength (Pinch Dynamometer Testing) 5  -KA     Left Hand: [place] ([place]) Pinch Strength (Pinch Dynamometer Testing) 5  -KA     Right Hand: Lateral (Key) Pinch Strength (Pinch Dynamometer Testing) 6  -KA     Right Hand: [place] ([place]) Pinch Strength (Pinch Dynamometer Testing) 5  -KA     Additional Documentation Left Hand, Setting 2 (Dynamometer Testing) (Row);Right Hand, Setting 2 (Dynamometer Testing) (Row);Left Hand: Lateral (Key) Pinch Strength (Pinch Dynamometer Testing) (Row);Left Hand: [place] (Phil) Pinch Strength (Pinch Dynamometer Testing) (Row);Right Hand: Lateral (Key) Pinch Strength (Pinch Dynamometer Testing) (Row);Right Hand: [place] (Phil) Pinch Strength (Pinch Dynamometer Testing) (Row)  -     Row Name 09/12/22 1518          Upper Extremity (Manual Muscle Testing)    Upper Extremity: Manual Muscle Testing (MMT) left shoulder strength deficit;right shoulder strength deficit;left elbow/forearm strength deficit;right elbow/forearm strength deficit  -     Row Name 09/12/22 1518          Left Shoulder (Manual Muscle Testing)    Left Shoulder Manual Muscle Testing (MMT) flexion  -KA     Comment, MMT: Left Shoulder 3+/5  -[facility] Name 09/12/22 1518          Right Shoulder (Manual Muscle Testing)    Right Shoulder Manual Muscle Testing (MMT) flexion  -KA     Comment, MMT: Right Shoulder 3+/5   -KA     Row Name 09/12/22 1518          Left Elbow/Forearm (Manual Muscle Testing)    Left Elbow/Forearm Manual Muscle Testing (MMT) flexion  -KA     Comment, MMT: Left Elbow/Forearm 3+/5  -KA     Row Name 09/12/22 1518          Right Elbow/Forearm (Manual Muscle Testing)    Right Elbow/Forearm Manual Muscle Testing (MMT) flexion  -KA     Comment, MMT: Right Elbow/Forearm 3+/5  -KA     Row Name 09/12/22 1157          Bathing    [place] Level (Bathing) bathing skills;moderate assist (50% patient effort);verbal cues;nonverbal cues (demo/gesture)  -DN     Position (Bathing) supine;supported sitting  -DN     Set-up Assistance (Bathing) obtain supplies  -DN     Comment (Bathing) LB completed supine in bed  -DN     Row Name 09/12/22 1157          Upper Body Dressing    [place] Level (Upper Body Dressing) upper body dressing skills;don;pull over garment;minimum assist (75% or more patient effort)  -DN     Position (Upper Body Dressing) supported sitting  -DN     Set-up Assistance (Upper Body Dressing) obtain clothing  -DN     Row Name 09/12/22 1157          Lower Body Dressing    [place] Level (Lower Body Dressing) don;pants/bottoms;shoes/slippers;socks;underwear;dependent (less than 25% patient effort)  -DN     Position (Lower Body Dressing) supine  -DN     Row Name 09/12/22 1157          Grooming    [place] Level (Grooming) grooming skills;deodorant application;hair care, combing/brushing;oral care regimen;supervision  -DN     Position (Grooming) supported sitting  -DN     Row Name 09/12/22 1157          Transfers    Bed-Chair [place] (Transfers) minimum assist (75% patient effort)  -DN     Row Name 09/12/22 1157          Bed-Chair Transfer    Comment, (Bed-Chair Transfer) stand pivot bed to w/c  -DN     Row Name 09/12/22 1518          Motor Skills    Results, 9 Hole Peg Test of Fine Motor Coordination R: 53.5 L: 55.6  Box and blocks: R:26 L:25  -KA     Therapeutic Exercise hand  -KA     Row Name  09/12/22 1518          Hand (Therapeutic Exercise)    Hand (Therapeutic Exercise) strengthening exercise  -     Hand Strengthening (Therapeutic Exercise) bilateral;finger flexion;finger extension;10 repetitions;3 sets  blue resistive foam  -KA     Row Name 09/12/22 1518 09/12/22 1157       Positioning and Restraints    Pre-Treatment Position sitting in chair/recliner  -KA sitting in chair/recliner  -DN    Post Treatment Position wheelchair  -KA wheelchair  -DN    In Bed -- sitting;call light within reach;encouraged to call for assist;exit alarm on  -DN    In Wheelchair sitting;call light within reach;encouraged to call for assist;exit alarm on;with family/caregiver  -KA --          User Key  (r) = Recorded By, (t) = Taken By, (c) = Cosigned By    Initials Name Effective Dates    Sina Currie OT 06/16/21 -     Bean Pina OT 02/22/22 -                          OT Recommendation and Plan                         Time Calculation:      Time Calculation- OT     Row Name 09/12/22 1528 09/12/22 0830          Time Calculation- OT    OT Start Time 1400  -KA 0830  -DN     OT Stop Time 1430  -KA 0900  -DN     OT Time Calculation (min) 30 min  -KA 30 min  -DN           User Key  (r) = Recorded By, (t) = Taken By, (c) = Cosigned By    Initials Name Provider Type    Sina Currie OT Occupational Therapist    Bean Pina OT Occupational Therapist              Therapy Charges for Today     Code Description Service Date Service Provider Modifiers Qty    56288645428 HC OT THERAPEUTIC ACT EA 15 MIN 9/12/2022 Bean Perez OT GO 1    02188249340 HC OT THER PROC EA 15 MIN 9/12/2022 Bean Perez OT GO 1                   BEAN PEREZ OT  9/12/2022

## 2022-09-12 NOTE — PROGRESS NOTES
Recreational Therapy Note    Patient Name: Summer Smith   MRN: [id number]    Therapeutic Recreation Eval and Treat (last 12 hours)     Therapeutic Recreation Eval & Treat     Row Name 09/12/22 1454       Lifestyle/Recreational History/Interest    Current Living Situation with family  -    Transportation Situation car, drives self  -SS    Row Name 09/12/22 1454       Recreational History and Interests    How Important is Recreation to You? somewhat important  -SS    Satisfied With Leisure Lifestyle? yes  -SS    Participate Regularly in Leisure Activity? yes  -SS    Current Hobbies/Interests arts/crafts;family functions;group/social activities  -    Art/Crafts other (see comments)  quilting  -    Group/Social Activities [employer]-related activity  -    Row Name 09/12/22 1454       Coping/Wellbeing    Current State of Wellbeing calm  -    Factors Impacting Current State of Wellbeing no significant issues  -    Row Name 09/12/22 1454       Therapeutic Recreation Participation    Orientation to Therapeutic Recreation patient  -SS    Row Name 09/12/22 1454       Therapeutic Recreation Assessment/Plan    Recreation Therapy Goals/Objectives functional leisure skills  -    Recreation Plan structured leisure participation  -          User Key  (r) = Recorded By, (t) = Taken By, (c) = Cosigned By    Initials Name Provider Type    SS Veronika Emmanuel, CTRS Recreational Therapist                  CJ Matos  9/12/2022

## 2022-09-12 NOTE — PROGRESS NOTES
"Section B. Hearing, Speech, Vision: Expression of Ideas and Wants: Exhibits some  difficulty with expressing needs and ideas (e.g., some words or finishing  thoughts) or speech is not clear.  Understanding Verbal and Non-Verbal Content: Usually Understands: Understands  most conversations, but misses some part/intent of message. Requires cues at  times to understand.    Section C. Cognitive Patterns: Brief Interview for Mental Status (BIMS) was  conducted.  Repetition of Three Words: Two words  Able to report correct year: Missed by more than 5 years or no answer  Able to report correct month: Accurate within 5 days  Able to report correct day of the week: Correct  Able to recall \"sock\": No, could not recall  Able to recall \"blue\": No, could not recall  Able to recall \"bed\": No, could not recall    BIMS SUMMARY SCORE: 5 Severe impairment Patient was able to complete the Brief  Interview for Mental Status    Signed by: Marlen Ochoa, SLP    "

## 2022-09-12 NOTE — PROGRESS NOTES
Physical Medicine and Rehabilitation  Inpatient Rehabilitation Interdisciplinary Plan of Care    Demographics            Age: 71Y            Gender: Female    Admission Date: 9/9/2022 9:57:00 PM  Rehabilitation Diagnosis:  Immobility Syndrome  1.  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 Will  teach family colostomy care. Pain controlled. Encouraged to at least take pain  meds prior to therapy  3.  HTN- stable on norvasc, zestril. Running low. Will decrease lisinopril to 5  mg.  4.  Depression - low dose zoloft  5.  DVT proph- was on lovenox, no mention of stopping or continuing. Will  continue 40 mg daily for now  6. Pulm - nasal cannula O2. Wean, keep sats > 90%    Plan of Care  Anticipated Discharge Date/Estimated Length of Stay: 4 weeks  Anticipated Discharge Destination: Community discharge with assistance  Discharge Plan : Patient lives with  in one story home with ramp  entrance. One step from porch into home.  D/C plan is home with  and intermittent assist from daughters who live  close by.  Medical Necessity Expected Level Rationale: Goals are to acheieve a level of  contact guard assist with ambulation and ADL's.  Rehab prognosis fair.  Medical  prognosis fair.  Intensity and Duration: an average of 3 hours/5 days per week  Medical Supervision and 24 Hour Rehab Nursing: x  Physical Therapy: x  PT Intensity/Duration: 1 hr / day, 5 days / week, for approximately 4 weeks.  Occupational Therapy: x  OT Intensity/Duration: 1 hr / day, 5 days / week, for approximately 4 weeks.  Speech and Language Therapy: x  SLP Intensity/Duration: 1 hr / day, 5 days / week, for approximately 4 weeks.  Social Work: x  Therapeutic Recreation: x  Psychology: x  Registered Dietician: x  Updated (if changes indicated)  No changes to plan.    Based on the patient's medical and functional status, their prognosis and  expected level of  functional improvement is: Goals are to acheieve a level of  contact guard assist with ambulation and ADL's.  Rehab prognosis fair.  Medical  prognosis fair.    Interdisciplinary Problem/Goals/Status  Cognition    [ST] Executive Functions(Active)  Current Status(09/12/2022): Mild-moderate cognitive impairment characterized by  difficulty with visual attention, mental manipulation, organization, and  executive functioning skills (planning,deficit awareness)  Weekly Goal(09/19/2022): Pt will use call light appropriately; recall details of  the day; participate in structured therapy activities given min-mod cues  Discharge Goal: Functional executive functioning skills for home        Mobility    [OT] Toilet Transfers(Active)  Current Status(09/10/2022): mod Ax2  Weekly Goal(09/17/2022): min Ax2  Discharge Goal: min A    [OT] Tub/Shower Transfers(Active)  Current Status(09/10/2022): Mod Ax2  Weekly Goal(09/17/2022): min Ax2  Discharge Goal: Min A        Pain    [RN] Pain Management(Active)  Current Status(09/10/2022): Pain currently not at tolerable rate  Weekly Goal(09/17/2022): Pain will be managed at tolerable rate  Discharge Goal: Patient will be able to tolerate pain and manage        Psychosocial    [RN] Coping/Adjustment(Active)  Current Status(09/10/2022): Pt is adjusting to bowel resection/colostomy  Weekly Goal(09/17/2022): Patient adjusted to new bowel regimen  Discharge Goal: Patient coping well with new colostomy        Safety    [RN] Potential for Injury(Active)  Current Status(09/10/2022): Patient at risk for falls due to weakness from  abdominal surger  Weekly Goal(09/17/2022): Patient will use call light appropriately  Discharge Goal: Patient will be aware of risk of falls        Self Care    [OT] Bathing(Active)  Current Status(09/10/2022): max A  Weekly Goal(09/17/2022): mod A  Discharge Goal: min A    [OT] Dressing (Lower)(Active)  Current Status(09/10/2022): Dep  Weekly Goal(09/17/2022): max  A  Discharge Goal: min A    [OT] Dressing (Upper)(Active)  Current Status(09/10/2022): mod A  Weekly Goal(09/17/2022): min A  Discharge Goal: set up    [OT] Eating(Active)  Current Status(09/10/2022): min A  Weekly Goal(09/17/2022): SBA  Discharge Goal: set up    [OT] Grooming(Active)  Current Status(09/10/2022): mod A  Weekly Goal(09/17/2022): min A  Discharge Goal: set up    [OT] Toileting(Active)  Current Status(09/10/2022): Dep  Weekly Goal(09/17/2022): max A  Discharge Goal: min A        Sphincter Control    [RN] Bladder Management(Active)  Current Status(09/10/2022): Patient using external catheter for bedtime  Weekly Goal(09/17/2022): Pt continent of bladder  Discharge Goal: Pt continent 100%    [RN] Bowel Management(Active)  Current Status(09/10/2022): Patient has a new colostomy  Weekly Goal(09/17/2022): Patient will maintain skin integrity around stoma  Discharge Goal: Patient will demonstrate techniques to maintain skin integrity  and change appliance independently        Swallow Function    [ST] Swallowing(Active)  Current Status(09/12/2022): Mechanical soft / thin liquids; medication whole  with thin liquid  Weekly Goal(09/19/2022): Patient will tolerate least restrictive diet with no  overt s/sx of aspiration or penetration.  Discharge Goal: Patient will tolerate least restrictive diet with no overt s/sx  of aspiration or penetration.      Comments:    Signed by: Hossein Rivers MD

## 2022-09-12 NOTE — PROGRESS NOTES
LOS: 3 days   Patient Care Team:  Wale Jacob MD as PCP - General (Family Medicine)      JASBIR MAHMOOD  1950         ADMITTING DIAGNOSIS:  Immobility syndrome    Subjective     Tolerates therapies. Expected abd soreness    Objective     Vitals:    09/12/22 1436   BP: 100/62   Pulse: 70   Resp: 18   Temp: 97.6 °F (36.4 °C)   SpO2: 95%       PHYSICAL EXAM:     Awake, alert and 0x3  NAD  Speech stronger today  Heart rr no m/g/r  Lungs cta bilat- is on 02 per nc  abd- new colostomy with stool  Incision intact  Ext: right elbow laceration sutured, CDI  Moving all 4 extremities to command- generalized weakness  1+ edema in ankles     MEDICATIONS  Scheduled Meds:amLODIPine, 5 mg, Oral, Q24H  atorvastatin, 40 mg, Oral, Nightly  cholecalciferol, 2,000 Units, Oral, Daily  enoxaparin, 40 mg, Subcutaneous, Nightly  lisinopril, 10 mg, Oral, Q24H  pantoprazole, 40 mg, Oral, Q AM  sertraline, 25 mg, Oral, Daily      Continuous Infusions:   PRN Meds:.•  acetaminophen  •  albuterol  •  HYDROcodone-acetaminophen **OR** HYDROcodone-acetaminophen      RESULTS  No results found for: POCGLU            Invalid input(s): LABALBU, PROT        ASSESSMENT and PLAN    Aneurysm (HCC)    Immobility Syndrome  1.  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 Will teach family colostomy care. Pain controlled. Encouraged to at least take pain meds prior to therapy  3.  HTN- stable on norvasc, zestril. Running low. Will decrease lisinopril to 5 mg.   4.  Depression - low dose zoloft  5.  DVT proph- was on lovenox-Will continue 40 mg daily for now  6. Pulm - nasal cannula O2. Wean, keep sats > 90%  7. Right lateral elbow laceration - August 31 - sutured    Now admit for comprehensive acute inpatient rehabilitation .  This would be an interdisciplinary program with physical therapy 1.5 hour,  occupational therapy 1.5 hour,  5 days a week.  Rehabilitation  nursing for carryover, monitoring of  GI   status, bowel and bladder, and skin  Ongoing physician follow-up.  Weekly team conferences.  Goals are to achieve a level of supervision with  mobility and self-care and improved activity tolerance.   Rehabilitation prognosis air.  Medical prognosis fair.  Estimated length of stay is approximately 10 days , but is only an estimation.     The patient's functional status and clinical status is unchanged from preadmission assessment and the patient continues appropriate for acute inpatient rehabilitation.  Goal is for home with  Home health   therapies.  Barrier to discharge:  Impaired mobility and self care   - work on  Transfers, balance, gait, ADLS  to overcome.             Hossein Rivers MD      During rounds, used appropriate personal protective equipment including mask and gloves.  Additional gown if indicated.  Mask used was standard procedure mask. Appropriate PPE was worn during the entire visit.  Hand hygiene was completed before and after.

## 2022-09-12 NOTE — PLAN OF CARE
Goal Outcome Evaluation:              Outcome Evaluation: A&OX4. Calm, cooperative, and pleasant. Forgetful at times.  at bedside. participated fully in therapy. Aneurysm. Abdominal incision. Lower part of incision slight drainage. R. elbow has bruises and sutures. wound care completed. Colostomy to L. lower abdomen. Wound care will be changing tomorrow. One-piece extra appliances in her room. Wears glasses. Is on O2 at all times. Not requiring 4L during the day. Decreased to 2L. Steri-strips present to abdomen. Norco 5 mg q 4 hours for pain. Continent bladder during the day. Added humidifed air. Purewick during the night. Diet: soft, ground, thins. Meds whole with water.

## 2022-09-12 NOTE — PROGRESS NOTES
Case Management  Inpatient Rehabilitation Plan of Care and Discharge Plan Note    Rehabilitation Diagnosis:  Immobility Syndrome  Date of Onset:  08/31/2022    Medical Summary:  72 yo female was initially at [facility] and  transferred to [facility] for L MCA aneurysm, multi focal bilat basilar  pneumonia, diverticulitis with microperforation and small abscess s/p ex lap  with bowel resection and colostomy. Is on a soft diet with supplements. Develop  Afib. Required TPN. ECHO found moderately calcified aortic valve with moderate  aortic stenosis. [place] did not do any intervention for aneurysm-  Plan outpt  angiogram. Wound culture grew pseudomonas and staph hominis- flagyl and cipro  are now finished. Prior to admission had been treated for UTI. Developed abd  pain- that is what took her to hospital initially. Had arm laceration that  required sutures.  ?  Past Medical History: ?  PMH-COPD- was not on 02  GERD  T/A surgery  Inner ear surgery  Tubal ligation  ?    Plan of Care  Updated Problems/Interventions  Field    Expected Intensity:  Average of 3 hours of therapy 5 days/week.  Interdisciplinary Team:  Interdisciplinary Team: Medical Supervision and 24 Hour Rehabilitation Nursing.,  Physical Therapy:, Occupational Therapy:, Speech and Language Therapy:, Social  Work, Therapeutic Recreation., Psychology., Registered Dietitian.  Physical Therapy Intensity/Duration: 1 hr / day, 5 days / week, for  approximately 4 weeks.  Occupational Therapy Intensity/Duration: 1 hr / day, 5 days / week, for  approximately 4 weeks.  Speech Language Pathology  Intensity/Duration: 1 hr / day, 5 days / week, for  approximately 4 weeks.  Estimated Length of Stay/Anticipated Discharge Date: 4 weeks  Anticipated Discharge Destination:  Anticipated discharge destination from inpatient rehabilitation is community  discharge with assistance. Home with spouse who can provide 24/7 assist,  siblings lives a block away and can all  assist as well.      Based on the patient's medical and functional status, their prognosis and  expected level of functional improvement is:  Goals are to acheieve a level of  contact guard assist with ambulation and ADL's.  Rehab prognosis fair.  Medical  prognosis fair.    Signed by: Elisha Chaparro RN

## 2022-09-12 NOTE — PROGRESS NOTES
Inpatient Rehabilitation Plan of Care Note    Plan of Care  Care Plan Reviewed - No updates at this time.    Pain    Performed Intervention(s)  Pain medication as requested and available  Repositioning and wound care      Sphincter Control    Performed Intervention(s)  Incontinance products and elo care  answer call light promptly  WCON consult  skin/stoma assessment      Psychosocial    Performed Intervention(s)  supportive family  Pt given opportunity to express concerns/feelings      Safety    Performed Intervention(s)  Personal items and call light w/in reach  falls precaution  hourly rounding    Signed by: Alysa Junior RN

## 2022-09-12 NOTE — NURSING NOTE
09/12/22 1255   Colostomy LLQ   Placement Date/Time: 09/09/22 2100   Placed by External Staff?: Other hospital  Location: LLQ   Stomal Appliance 1 piece;Clean;Dry;Intact  (1-piece Coloplast appliance.)   Stoma Function flatus;stool   Stool Color brown   Stool Consistency loose   Treatment Placement checked     CWON note: pt seen in rehab for fairly new colostomy. Pt does not remember having surgery and is totally unaware or unfamiliar with her ostomy. She will need to be treated as if she has a brand new colostomy and will require teaching, if she is cognitively able to learn. Current appliance remains intact with excellent seal, stoma is located in a deep abdominal crease and the 1-piece Coloplast pouch appears to be working well. We will see pt tomorrow for pouch change and attempt teaching.  was at bedside, family may learn to do the pouch change.

## 2022-09-12 NOTE — THERAPY TREATMENT NOTE
Inpatient Rehabilitation - Occupational Therapy Treatment Note    [facility]     Patient Name: Summer Smith  : 1950  MRN: [id number]    Today's Date: 2022                 Admit Date: 2022         ICD-10-CM ICD-9-CM   1. Follow-up exam  Z09 V67.9       Patient Active Problem List   Diagnosis   • Aneurysm (HCC)       Past Medical History:   Diagnosis Date   • COPD (chronic obstructive pulmonary disease) (HCC)    • Elevated cholesterol    • GERD (gastroesophageal reflux disease)        Past Surgical History:   Procedure Laterality Date   • CARDIAC CATHETERIZATION     • COLON SURGERY     • COLONOSCOPY     • SKIN BIOPSY               IRF OT ASSESSMENT FLOWSHEET (last 12 hours)     IRF OT Evaluation and Treatment     Row Name 22 1157          OT Time and Intention    Document Type progress note  -DN     Mode of Treatment occupational therapy  -DN     Patient Effort good  -DN     Symptoms Noted During/After Treatment fatigue;increased pain  -DN     Row Name 22 1157          Pain Assessment    Pretreatment Pain Rating 5/10  -DN     Posttreatment Pain Rating 5/10  -DN     Pain Location incisional  -DN     Pain Location - abdomen  -DN     Row Name 22 115          Bathing    [place] Level (Bathing) bathing skills;moderate assist (50% patient effort);verbal cues;nonverbal cues (demo/gesture)  -DN     Position (Bathing) supine;supported sitting  -DN     Set-up Assistance (Bathing) obtain supplies  -DN     Comment (Bathing) LB completed supine in bed  -DN     Row Name 22 115          Upper Body Dressing    [place] Level (Upper Body Dressing) upper body dressing skills;don;pull over garment;minimum assist (75% or more patient effort)  -DN     Position (Upper Body Dressing) supported sitting  -DN     Set-up Assistance (Upper Body Dressing) obtain clothing  -DN     Row Name 22 1158          Lower Body Dressing    [place] Level (Lower Body Dressing)  don;pants/bottoms;shoes/slippers;socks;underwear;dependent (less than 25% patient effort)  -DN     Position (Lower Body Dressing) supine  -DN     Row Name 09/12/22 1157          Grooming    [place] Level (Grooming) grooming skills;deodorant application;hair care, combing/brushing;oral care regimen;supervision  -DN     Position (Grooming) supported sitting  -DN     Row Name 09/12/22 1157          Transfers    Bed-Chair [place] (Transfers) minimum assist (75% patient effort)  -DN     Row Name 09/12/22 1157          Bed-Chair Transfer    Comment, (Bed-Chair Transfer) stand pivot bed to w/c  -DN     Row Name 09/12/22 1157          Positioning and Restraints    Pre-Treatment Position sitting in chair/recliner  -DN     Post Treatment Position wheelchair  -DN     In Bed sitting;call light within reach;encouraged to call for assist;exit alarm on  -DN           User Key  (r) = Recorded By, (t) = Taken By, (c) = Cosigned By    Initials Name Effective Dates    Sina Currie OT 06/16/21 -                          OT Recommendation and Plan                         Time Calculation:      Time Calculation- OT     Row Name 09/12/22 0830             Time Calculation- OT    OT Start Time 0830  -DN      OT Stop Time 0900  -DN      OT Time Calculation (min) 30 min  -DN            User Key  (r) = Recorded By, (t) = Taken By, (c) = Cosigned By    Initials Name Provider Type    Sina Currie OT Occupational Therapist              Therapy Charges for Today     Code Description Service Date Service Provider Modifiers Qty    [id number] HC OT SELF CARE/MGMT/TRAIN EA 15 MIN 9/12/2022 Sina Walls OT GO 2                   Sina Walls OT  9/12/2022

## 2022-09-12 NOTE — PROGRESS NOTES
Inpatient Rehabilitation Plan of Care Note    Plan of Care  Updated Problems/Interventions  Cognition    [ST] Executive Functions(Active)  Current Status(09/12/2022): Mild-moderate cognitive impairment characterized by  difficulty with visual attention, mental manipulation, organization, and  executive functioning skills (planning,deficit awareness)  Weekly Goal(09/19/2022): Pt will use call light appropriately; recall details of  the day; participate in structured therapy activities given min-mod cues  Discharge Goal: Functional executive functioning skills for home        Swallow Function    [ST] Swallowing(Active)  Current Status(09/12/2022): Mechanical soft / thin liquids; medication whole  with thin liquid  Weekly Goal(09/19/2022): Patient will tolerate least restrictive diet with no  overt s/sx of aspiration or penetration.  Discharge Goal: Patient will tolerate least restrictive diet with no overt s/sx  of aspiration or penetration.    Signed by: Marlen Ochoa, SLP

## 2022-09-12 NOTE — THERAPY EVALUATION
Inpatient Rehabilitation - Speech Language Pathology Initial Evaluation    [facility]     Patient Name: Summer Smith  : 1950  MRN: [id number]    Today's Date: 2022                   Admit Date: 2022       Visit Dx:      ICD-10-CM ICD-9-CM   1. Follow-up exam  Z09 V67.9       Patient Active Problem List   Diagnosis   • Aneurysm (HCC)       Past Medical History:   Diagnosis Date   • COPD (chronic obstructive pulmonary disease) (HCC)    • Elevated cholesterol    • GERD (gastroesophageal reflux disease)        Past Surgical History:   Procedure Laterality Date   • CARDIAC CATHETERIZATION     • COLON SURGERY     • COLONOSCOPY     • SKIN BIOPSY         SLP Recommendation and Plan    SLP Diagnosis: mild-moderate cognitive impairment (22)     Rehab Potential/Prognosis: good (22)     Anticipated Discharge Disposition (SLP): home with assist, anticipate therapy at next level of care (22)        Predicted Duration Therapy Intervention (Days): until discharge (22)                         SLP EVALUATION (last 72 hours)     SLP SLC Evaluation     Row Name 09/12/22 0900 09/10/22 1030                Communication Assessment/Intervention    Document Type evaluation  -SR evaluation  -LS       Subjective Information no complaints  -SR --       Patient Observations alert;cooperative;agree to therapy  -SR lethargic;agree to therapy  -LS       Patient Effort good  -SR good  -LS       Symptoms Noted During/After Treatment fatigue  -SR --                General Information    Patient Profile Reviewed yes  -SR yes  -LS       Pertinent History Of Current Problem 70 yo female; L MCA aneurysm, multi focal bilat basilar pneumonia, diverticulitis with microperforation and small abscess s/p ex lap with bowel resection and colostomy  -SR Pt is a 71 year old female with PMH of hyperlipidemia and GERD, was admitted to [facility] for perforated diverticulum with  paradiverticular absess with associated sepsis, status post ex lap and resection POD #3 Pt admitted to this rehab today at 03:44.  by Geovany Porras MS CCC-SLP at 9/10/22 0800  -LS       Precautions/Limitations, Vision WFL with corrective lenses;for purposes of eval  -SR WFL  -LS       Precautions/Limitations, Hearing WFL;for purposes of eval  -SR WFL  -LS       Patient Level of Education -- High school.  -LS       Prior Level of Function-Communication -- cognitive-linguistic impairment  -LS       Plans/Goals Discussed with patient;agreed upon  -SR patient  -LS       Barriers to Rehab none identified  -SR --       Patient's Goals for Discharge return to home  -SR --       Standardized Assessment Used CLQT  -SR SLUMS  Pt scored 13/30 on the SLUMS indicating a very decreased neurocognitive impairment.  -LS                Pain Scale: Numbers Pre/Post-Treatment    Pretreatment Pain Rating 5/10  -SR --       Posttreatment Pain Rating 5/10  -SR --       Pain Location incisional  -SR --       Pain Location - abdomen  -SR --                Cognitive Assessment Intervention- SLP    Cognitive Function (Cognition) -- moderate impairment  -LS       Orientation Status (Cognition) -- awareness of basic personal information  -LS       Memory (Cognitive) -- mod-complex  -LS       Functional Math (Cognitive) -- simple;severe impairment  -LS                Standardized Tests    Cognitive/Memory Tests CLQT: Cognitive Linguistic Quick Test  -SR SLUMS: [facility] Mental Status Examination  Left side neglect observed with clock. Also number sequencing severley decreased.  -LS                CLQT (The Cognitive Linguistic Quick Test)    Attention Domain Score 123  -SR --       Attention Severity Rating 3: Mild  -SR --       Memory Domain Score 140  -SR --       Memory Severity Rating 3: Mild  -SR --       Executive Function Domain Score 7  -SR --       Executive Function Severity Rating 1: Severe  -SR --       Language Domain  Score 27  -SR --       Language Severity Rating 3: Mild  -SR --       Visuospatial Domain Score 44  -SR --       Visuospatial Severity Rating 3: Mild  -SR --       Clock Drawing Total Score 5  -SR --       Clock Drawing Severity Rating Severe  -SR --       Composite Severity Rating 2.6  -SR --       Composite Severity Rating Range 3.4 - 2.5: Mild  -SR --       CLQT Comments The Cognitive Linguistic Quick Test (CLQT) was completed to further assess patient's strengths and weaknesses. Patient received a score of 2.6/4.0 indicating mild cognitive deficits. The results of the assessment were as followed: Attention - Mild, Memory - Mild, Executive Functions - Severe, Language -Mild, and Visuospatial skills -Mild.  Patient exhibited strengths in confrontational naming, visual and auditory memory, and recalling personal facts. She had difficulty with word retrieval (i.e, generative naming), mental manipulation, and visual attention. During the clock drawing subtest, patient perseverated the number 12 once and arranged numbers on the right side of the clock. Difficulty with visuospatial skills was also observed during the mazes and design generation subtest. Patient showed decreased awareness toward cognitive deficits. Agreeable to speech therapy activities to target visual attention, organization, mental manipulation, and executive functioning skills (i.e, planning, deficit awareness).  -SR --                SLUMS: [facility] Mental Status Examination    SLUMS Score -- 13  -       SLUMS Range -- 1-20: Dementia (High school education or higher)  -       SLUMS Comments -- Pt was lethargic during eval.  Pt also exhibited a left side neglect as evoidenced with the numbers on the clock.  Number sequencing was also severely decreased.  -                SLP Evaluation Clinical Impressions    SLP Diagnosis mild-moderate cognitive impairment  -SR severe cognitive deficits.  -LS       Rehab Potential/Prognosis good   -SR good  -LS       SLC Criteria for Skilled Therapy Interventions Met yes  -SR yes  -LS       Functional Impact difficulty completing home management task;difficulty completing vocational tasks  -SR needs 24 hour supervision  -LS                Recommendations    Therapy Frequency (SLP SLC) 5 days per week  -SR 5 days per week  -LS       Predicted Duration Therapy Intervention (Days) until discharge  -SR until discharge  -LS       Anticipated Discharge Disposition (SLP) home with assist;anticipate therapy at next level of care  -SR unknown  -LS                Communication Treatment Objective and Progress Goals (SLP)    Auditory Comprehension Treatment Objectives -- Auditory Comprehension Treatment Objectives (Group)  -LS       Cognitive Linguistic Treatment Objectives -- Cognitive Linguistic Treatment Objectives (Group)  -LS                Auditory Comprehension Treatment Objectives    Follow Directions Selection -- follow directions, SLP goal 1  -LS                Follow Directions Goal 2 (SLP)    Progress (Ability to Follow Directions Goal 1, SLP) -- 50%  -LS                Cognitive Linguistic Treatment Objectives    Attention Selection attention, SLP goal 1  -SR --       Orientation Selection -- orientation, SLP goal 1  -LS       Memory Skills Selection memory skills, SLP goal 1  -SR --       Problem Solving Selection -- problem solving, SLP goal 1  -LS       Executive Function Skills Selection executive function skills, SLP goal 1  -SR --                Attention Goal 1 (SLP)    Improve Attention by Goal 1 (SLP) attending to task;complete selective attention task;80%;independently (over 90% accuracy)  -SR --                Orientation Goal 1 (SLP)    Improve Orientation Through Goal 1 (SLP) -- use environmental aids to assist with orientation;60%  -LS       Progress (Orientation Goal 1, SLP) -- 50%  -LS                Memory Skills Goal 1 (SLP)    Improve Memory Skills Through Goal 1 (SLP) repeat list in  sequential order;listen to a paragraph and answer questions;80%;independently (over 90% accuracy)  -SR --       Time Frame (Memory Skills Goal 1, SLP) by discharge  -SR --                Memory Skills Goal (SLP)    Time Frame (Memory Skills Goal, SLP) -- 2 weeks  -LS       Progress (Memory Skills Goal, SLP) -- 50%  -LS                Functional Problem Solving Skills Goal 1 (SLP)    Improve Problem Solving Through Goal 1 (SLP) determine solutions to simple ADL/safety problems;determine solutions to multifactorial problems;sequence steps in a task;complete organization/home management task;70%;independently (over 90% accuracy)  -SR --       Time Frame (Problem Solving Goal 1, SLP) by discharge  -SR --                Executive Functional Skills Goal 1 (SLP)    Improve Executive Function Skills Goal 1 (SLP) demonstrate awareness of deficit;identify strategies, strengths, limitations;time management activity;complex organization/planning activity;home management activity;70%;independently (over 90% accuracy)  -SR --       Time Frame (Executive Function Skills Goal 1, SLP) by discharge  -SR --             User Key  (r) = Recorded By, (t) = Taken By, (c) = Cosigned By    Initials Name Effective Dates    LS Geovany Porras, MS CCC-SLP 06/16/21 -     SR Marlen Ochoa, RAGHU 01/12/22 -                    EDUCATION    The patient has been educated in the following areas:       Cognitive Impairment.             SLP GOALS     Row Name 09/12/22 0900 09/10/22 1030 09/10/22 0800       (LTG) Patient will demonstrate functional swallow for    [place] (Demonstrate functional swallow) -- -- independently (over 90% accuracy)  -LS       (LTG) Patient will demonstrate progress toward functional swallow for    Diet Texture (Demonstrate progress toward functional swallow) -- -- soft ground textures  -LS    Liquid viscosity (Demonstrate progress toward functional swallow) -- -- thin liquids  -LS    [place] (Demonstrate progress  towards functional swallow) -- -- with moderate cues (50-74% accuracy)  -LS       Follow Directions Goal 2 (SLP)    Progress (Ability to Follow Directions Goal 1, SLP) -- 50%  -LS --       Attention Goal 1 (SLP)    Improve Attention by Goal 1 (SLP) attending to task;complete selective attention task;80%;independently (over 90% accuracy)  -SR -- --       Orientation Goal 1 (SLP)    Improve Orientation Through Goal 1 (SLP) -- use environmental aids to assist with orientation;60%  -LS --    Progress (Orientation Goal 1, SLP) -- 50%  -LS --       Memory Skills Goal 1 (SLP)    Improve Memory Skills Through Goal 1 (SLP) repeat list in sequential order;listen to a paragraph and answer questions;80%;independently (over 90% accuracy)  -SR -- --    Time Frame (Memory Skills Goal 1, SLP) by discharge  -SR -- --       Memory Skills Goal (SLP)    Time Frame (Memory Skills Goal, SLP) -- 2 weeks  -LS --    Progress (Memory Skills Goal, SLP) -- 50%  -LS --       Functional Problem Solving Skills Goal 1 (SLP)    Improve Problem Solving Through Goal 1 (SLP) determine solutions to simple ADL/safety problems;determine solutions to multifactorial problems;sequence steps in a task;complete organization/home management task;70%;independently (over 90% accuracy)  -SR complete organization/home management task  -LS --    Time Frame (Problem Solving Goal 1, SLP) by discharge  -SR -- --    Progress (Problem Solving Goal 1, SLP) -- 50%  -LS --       Executive Functional Skills Goal 1 (SLP)    Improve Executive Function Skills Goal 1 (SLP) demonstrate awareness of deficit;identify strategies, strengths, limitations;time management activity;complex organization/planning activity;home management activity;70%;independently (over 90% accuracy)  -SR -- --    Time Frame (Executive Function Skills Goal 1, SLP) by discharge  -SR -- --          User Key  (r) = Recorded By, (t) = Taken By, (c) = Cosigned By    Initials Name Provider Type    SEKOU Porras  MS ALESIA Armenta-SLP Speech and Language Pathologist     Marlen Ochoa SLP Speech and Language Pathologist                SLP Outcome Measures (last 72 hours)     SLP Outcome Measures     Row Name 09/10/22 1200             SLP Outcome Measures    Outcome Measure Used? Adult NOMS  -LS              Adult FCM Scores    FCM Chosen Swallowing  -LS      Swallowing FCM Score 4  -LS            User Key  (r) = Recorded By, (t) = Taken By, (c) = Cosigned By    Initials Name Effective Dates    Geovany Tolliver MS CCC-SLP 06/16/21 -                         Time Calculation:        Time Calculation- SLP     Row Name 09/12/22 1210             Time Calculation- SLP    SLP Start Time 0900  -SR      SLP Stop Time 1000  -SR      SLP Time Calculation (min) 60 min  -SR      Total Timed Code Minutes-  minute(s)  3285-9688; 5317-1650  -SR      SLP Received On 09/12/22  -SR            User Key  (r) = Recorded By, (t) = Taken By, (c) = Cosigned By    Initials Name Provider Type     Marlen Ochoa SLP Speech and Language Pathologist                  Therapy Charges for Today     Code Description Service Date Service Provider Modifiers Qty    25206764399 HC ST STD COG PERF TEST PER HOUR 9/12/2022 Marlen Ochoa SLP GN 2            ADULT NOMS (last 72 hours)     Adult NOMS     Row Name 09/10/22 1200                   Adult FCM Scores    FCM Chosen Swallowing  -LS        Swallowing FCM Score 4  -LS              User Key  (r) = Recorded By, (t) = Taken By, (c) = Cosigned By    Initials Name Effective Dates    Geovany Tolliver MS CCC-SLP 06/16/21 -                            RAGHU Zavala  9/12/2022     No

## 2022-09-13 LAB
BASOPHILS # BLD AUTO: 0.04 10*3/MM3 (ref 0–0.2)
BASOPHILS NFR BLD AUTO: 0.5 % (ref 0–1.5)
DEPRECATED RDW RBC AUTO: 45 FL (ref 37–54)
EOSINOPHIL # BLD AUTO: 0.17 10*3/MM3 (ref 0–0.4)
EOSINOPHIL NFR BLD AUTO: 2.1 % (ref 0.3–6.2)
ERYTHROCYTE [DISTWIDTH] IN BLOOD BY AUTOMATED COUNT: 13.2 % (ref 12.3–15.4)
HCT VFR BLD AUTO: 30.3 % (ref 34–46.6)
HGB BLD-MCNC: 9.7 G/DL (ref 12–15.9)
LYMPHOCYTES # BLD AUTO: 1.63 10*3/MM3 (ref 0.7–3.1)
LYMPHOCYTES NFR BLD AUTO: 20.2 % (ref 19.6–45.3)
MCH RBC QN AUTO: 29.9 PG (ref 26.6–33)
MCHC RBC AUTO-ENTMCNC: 32 G/DL (ref 31.5–35.7)
MCV RBC AUTO: 93.5 FL (ref 79–97)
MONOCYTES # BLD AUTO: 0.88 10*3/MM3 (ref 0.1–0.9)
MONOCYTES NFR BLD AUTO: 10.9 % (ref 5–12)
NEUTROPHILS NFR BLD AUTO: 5.21 10*3/MM3 (ref 1.7–7)
NEUTROPHILS NFR BLD AUTO: 64.8 % (ref 42.7–76)
PLATELET # BLD AUTO: 391 10*3/MM3 (ref 140–450)
PMV BLD AUTO: 11 FL (ref 6–12)
RBC # BLD AUTO: 3.24 10*6/MM3 (ref 3.77–5.28)
WBC NRBC COR # BLD: 8.05 10*3/MM3 (ref 3.4–10.8)

## 2022-09-13 PROCEDURE — 97535 SELF CARE MNGMENT TRAINING: CPT

## 2022-09-13 PROCEDURE — 25010000002 ENOXAPARIN PER 10 MG: Performed by: PHYSICAL MEDICINE & REHABILITATION

## 2022-09-13 PROCEDURE — 97130 THER IVNTJ EA ADDL 15 MIN: CPT

## 2022-09-13 PROCEDURE — 85025 COMPLETE CBC W/AUTO DIFF WBC: CPT | Performed by: PHYSICAL MEDICINE & REHABILITATION

## 2022-09-13 PROCEDURE — 97110 THERAPEUTIC EXERCISES: CPT

## 2022-09-13 PROCEDURE — 97129 THER IVNTJ 1ST 15 MIN: CPT

## 2022-09-13 PROCEDURE — 97116 GAIT TRAINING THERAPY: CPT

## 2022-09-13 PROCEDURE — 97530 THERAPEUTIC ACTIVITIES: CPT

## 2022-09-13 RX ADMIN — AMLODIPINE BESYLATE 5 MG: 5 TABLET ORAL at 08:05

## 2022-09-13 RX ADMIN — HYDROCODONE BITARTRATE AND ACETAMINOPHEN 1 TABLET: 5; 325 TABLET ORAL at 20:57

## 2022-09-13 RX ADMIN — Medication 2000 UNITS: at 08:05

## 2022-09-13 RX ADMIN — LISINOPRIL 5 MG: 5 TABLET ORAL at 08:05

## 2022-09-13 RX ADMIN — SERTRALINE 25 MG: 25 TABLET, FILM COATED ORAL at 08:05

## 2022-09-13 RX ADMIN — HYDROCODONE BITARTRATE AND ACETAMINOPHEN 1 TABLET: 5; 325 TABLET ORAL at 08:05

## 2022-09-13 RX ADMIN — PANTOPRAZOLE SODIUM 40 MG: 40 TABLET, DELAYED RELEASE ORAL at 05:48

## 2022-09-13 RX ADMIN — ATORVASTATIN CALCIUM 40 MG: 20 TABLET, FILM COATED ORAL at 20:57

## 2022-09-13 RX ADMIN — ENOXAPARIN SODIUM 40 MG: 100 INJECTION SUBCUTANEOUS at 20:57

## 2022-09-13 NOTE — THERAPY TREATMENT NOTE
Inpatient Rehabilitation - Physical Therapy Treatment Note       [facility]     Patient Name: Summer Smith  : 1950  MRN: [id number]    Today's Date: 2022                    Admit Date: 2022      Visit Dx:     ICD-10-CM ICD-9-CM   1. Follow-up exam  Z09 V67.9       Patient Active Problem List   Diagnosis   • Aneurysm (HCC)       Past Medical History:   Diagnosis Date   • COPD (chronic obstructive pulmonary disease) (HCC)    • Elevated cholesterol    • GERD (gastroesophageal reflux disease)        Past Surgical History:   Procedure Laterality Date   • CARDIAC CATHETERIZATION     • COLON SURGERY     • COLONOSCOPY     • SKIN BIOPSY         PT ASSESSMENT (last 12 hours)     IRF PT Evaluation and Treatment     Row Name 22 1320 22 1113       PT Time and Intention    Document Type daily treatment  -MD daily treatment  -MG    Mode of Treatment physical therapy  -MD individual therapy;physical therapy  -MG    Patient/Family/Caregiver Comments/Observations Pt supine in bed showing no signs of acute distress.  -MD --    Total Minutes, Physical Therapy -- 30  -MG    Row Name 22 1113          General Information    Patient Profile Reviewed yes  -MG     General Observations of Patient Pt sitting up in wc in room, pleasant and ready for PT.  -MG     Existing Precautions/Restrictions fall;oxygen therapy device and L/min  colostomy, 2 L O2 nc.  -MG     Limitations/Impairments safety/cognitive  -MG     Row Name 22 13222 1113       Pain Assessment    Pretreatment Pain Rating 0/10 - no pain  -MD 5/10  -MG    Posttreatment Pain Rating -- 5/10  -MG    Pain Location -- incisional  -MG    Pain Location - -- abdomen  -MG    Row Name 22 13222 1113       Cognition/Psychosocial    Affect/Mental Status (Cognition) -- flat/blunted affect  -MG    Orientation Status (Cognition) oriented to;person  -MD oriented to;person  Stated being on [place] .  Re-oriented to [place], Sep 2022.  -MG    Follows Commands (Cognition) follows one-step commands;75-90% accuracy;increased processing time needed;delayed response/completion;initiation impaired  -MD follows one-step commands;over 90% accuracy;increased processing time needed;repetition of directions required;verbal cues/prompting required  -MG    Personal Safety Interventions fall prevention program maintained;gait belt  -MD fall prevention program maintained;gait belt;muscle strengthening facilitated;nonskid shoes/slippers when out of bed;supervised activity  -MG    Cognitive Function -- safety deficit  -MG    Safety Deficit (Cognition) -- minimal deficit;insight into deficits/self-awareness;safety precautions awareness  -MG    Row Name 09/13/22 1320          Bed Mobility    Supine-Sit [place] (Bed Mobility) verbal cues;minimum assist (75% patient effort)  -MD     Row Name 09/13/22 1320 09/13/22 1113       Transfers    Bed-Chair [place] (Transfers) verbal cues;moderate assist (50% patient effort)  -MD --    Assistive Device (Bed-Chair Transfers) wheelchair  -MD --    Sit-Stand [place] (Transfers) -- moderate assist (50% patient effort);nonverbal cues (demo/gesture);verbal cues  -MG    Stand-Sit [place] (Transfers) -- verbal cues;minimum assist (75% patient effort)  -MG    Row Name 09/13/22 1113          Sit-Stand Transfer    Assistive Device (Sit-Stand Transfers) parallel bars;wheelchair  -MG     Comment, (Sit-Stand Transfer) x2 for gait. x4 for strengthening - 1st STS pt attempt to push up from wc and was unable to come into standing. 2-3rd STS required ModA and increased time w/ pulling up on parallel bars, able to come into full stand. 4th STS pt unable to come into standing, stated being very tired, feeling weak. Pt had at least 30s-1min seated rest break between each STS.  -MG     Row Name 09/13/22 1113          Stand-Sit Transfer    Assistive Device (Stand-Sit Transfers) wheelchair  -MG      Row Name 09/13/22 1113          Gait/Stairs (Locomotion)    [place] Level (Gait) verbal cues;moderate assist (50% patient effort)  -MG     Assistive Device (Gait) parallel bars  -MG     Distance in Feet (Gait) 8' x 2  -MG     Pattern (Gait) step-through;step-to  -MG     Deviations/Abnormal Patterns (Gait) base of support, narrow;festinating/shuffling;hunter decreased;gait speed decreased;stride length decreased  -MG     Bilateral Gait Deviations forward flexed posture;foot drop/toe drag  -MG     Gait Assessment/Intervention Quick to fatigue, slight SOB. Pt fatigued around 6' then shortens stride length further and increases foot/toe drag. Heavy reliance on BUE's after 6'. Pt audibly SOB after 2nd walk, but SpO2 read 100% while on 2L O2.  -     Row Name 09/13/22 1113          Safety Issues, Functional Mobility    Impairments Affecting Function (Mobility) balance;cognition;endurance/activity tolerance;pain;postural/trunk control;strength  -MG     Cognitive Impairments, Mobility Safety/Performance insight into deficits/self-awareness;safety precaution awareness  -[facility] Name 09/13/22 1113          Balance    Static Sitting Balance standby assist  -MG     Position, Sitting Balance unsupported  sitting edge of chair  -MG     Static Standing Balance minimal assist;moderate assist  -MG     Position/Device Used, Standing Balance supported;parallel bars  -     Row Name 09/13/22 1320          Hip (Therapeutic Exercise)    Hip (Therapeutic Exercise) isometric exercises  -MD     Hip Isometrics (Therapeutic Exercise) bilateral;gluteal sets;10 repetitions;2 sets  -MD     Hip Strengthening (Therapeutic Exercise) bilateral;aBduction;aDduction;marching while seated;10 repetitions;2 sets  -[facility] Name 09/13/22 1320          Knee (Therapeutic Exercise)    Knee Strengthening (Therapeutic Exercise) bilateral;LAQ (long arc quad);hamstring curls;10 repetitions;yellow;2 sets  -MD     Row Name 09/13/22 1320 09/13/22  1113       Positioning and Restraints    Pre-Treatment Position sitting in chair/recliner  -MD sitting in chair/recliner  -MG    Post Treatment Position wheelchair  -MD wheelchair  -MG    In Wheelchair with OT;exit alarm on;call light within reach  -MD sitting;call light within reach;encouraged to call for assist;exit alarm on  -MG    Row Name 09/13/22 1113          Daily Progress Summary (PT)    Daily Progress Summary (PT) Pt able to ambulate 8' in // bars, however quickly fatigues at 6' demoing increased reliance on BUEs as well as increased foot/toe drag and shortened stride length. Pt was SOB after each walk, mainly after 2nd walk, but SpO2 read 100% while on 2L.  -MG           User Key  (r) = Recorded By, (t) = Taken By, (c) = Cosigned By    Initials Name Provider Type    Ale Regalado, PT Physical Therapist    MG Elisabeth Bowen, PT Physical Therapist              Wound 09/09/22 2200 Right upper arm Laceration (Active)   Dressing Appearance dry;intact 09/13/22 0805   Closure Sutures 09/13/22 0805   Base clean;pink 09/13/22 0805   Drainage Amount none 09/13/22 0805       Wound 09/09/22 2200 Left midline abdomen Incision (Active)   Dressing Appearance open to air 09/13/22 0805   Closure Adhesive closure strips 09/13/22 0805   Drainage Amount scant 09/13/22 0805   Care, Wound cleansed with;sterile normal saline 09/13/22 0805   Dressing Care gauze, dry 09/13/22 0805     Physical Therapy Education                 Title: PT OT SLP Therapies (Done)     Topic: Physical Therapy (Done)     Point: Mobility training (Done)     Learning Progress Summary           Patient Acceptance, E, VU,NR by MG at 9/13/2022 1143    Acceptance, E,TB, VU,NR by EE at 9/10/2022 1518                   Point: Home exercise program (Done)     Learning Progress Summary           Patient Acceptance, E, VU,NR by MG at 9/13/2022 1143    Acceptance, E,TB, VU,NR by EE at 9/10/2022 1518                   Point: Body mechanics (Done)     Learning  Progress Summary           Patient Acceptance, E, VU,NR by MG at 9/13/2022 1143    Acceptance, E,TB, VU,NR by  at 9/10/2022 1518                   Point: Precautions (Done)     Learning Progress Summary           Patient Acceptance, E, VU,NR by MG at 9/13/2022 1143    Acceptance, E, VU by MD at 9/12/2022 1114    Acceptance, E,TB, VU,NR by  at 9/10/2022 1518                               User Key     Initials Effective Dates Name Provider Type Discipline    EE 06/16/21 -  Rand Patel, PT Physical Therapist PT    MD 06/16/21 -  Ale Garza PT Physical Therapist PT    MG 05/24/22 -  Elisabeth Bowen, PT Physical Therapist PT                PT Recommendation and Plan                          Time Calculation:      PT Charges     Row Name 09/13/22 1319 09/13/22 1144          Time Calculation    Start Time 1300  -MD 1100  -MG     Stop Time 1330  -MD 1130  -MG     Time Calculation (min) 30 min  -MD 30 min  -MG     PT Received On -- 09/13/22  -MG     PT - Next Appointment -- 09/14/22  -MG           User Key  (r) = Recorded By, (t) = Taken By, (c) = Cosigned By    Initials Name Provider Type    Ale Regalado, PT Physical Therapist     Elisabeth Bowen, PT Physical Therapist                Therapy Charges for Today     Code Description Service Date Service Provider Modifiers Qty    58644195079 HC PT THER PROC EA 15 MIN 9/12/2022 Ale Garza, PT GP 2    71085618830 HC GAIT TRAINING EA 15 MIN 9/12/2022 Ale Garza, PT GP 2    59276124807 HC PT THER SUPP EA 15 MIN 9/12/2022 Ale Garza, PT GP 2    60773905956 HC PT THER PROC EA 15 MIN 9/13/2022 Ale Garza, PT GP 2              Patient was wearing a face mask during this therapy encounter. Therapist used appropriate personal protective equipment including mask and gloves.  Mask used was standard procedure mask. Appropriate PPE was worn during the entire therapy session. Hand hygiene was completed before and after therapy session. Patient is not in enhanced droplet precautions.            Ale Garza, PT  9/13/2022

## 2022-09-13 NOTE — NURSING NOTE
09/13/22 0731   Colostomy LLQ   Placement Date/Time: 09/09/22 2100   Placed by External Staff?: Other hospital  Location: LLQ   Stomal Appliance 1 piece;Changed   Stoma Appearance irregular;moist;pink  (located in a deep skin crease)   Peristomal Assessment Clean;Intact;Pink  (small area of drainage at 3 o'clock)   Accessories/Skin Care cleansed with water;skin barrier ring  (1-piece Coloplast Ralf SenSura pouch with barrier extenders)   Stoma Function flatus;stool   Stool Color brown   Stool Consistency loose   Treatment Bag change;Site care     CWON note: pt seen for pouch change and colostomy teaching. She has a fairly new colostomy and she is unable to remember having the surgery or anything about her stoma. Her family will need to learn her ostomy care, I have spoken to the [facility] nurse at [place] and pt's  and one of the daughters were being taught her ostomy care while she was at [place]. Today, her other daughter was present, so I was able to instruct her in pouch change procedure, measuring the stoma, cutting the pouch to fit the stoma, use of a barrier ring, skin care, showering with or without the pouch in place, and emptying the appliance. Daughter states she works at a pharmacy that handles ostomy supplies so she took pictures of her mom's supplies so she could see about ordering her supplies. Will plan to see pt 2x week for teaching with family as long as they need additional teaching while she is here.

## 2022-09-13 NOTE — PROGRESS NOTES
Inpatient Rehabilitation Plan of Care Note    Plan of Care  Care Plan Reviewed - No updates at this time.    Pain    [RN] Pain Management(Active)  Current Status(09/13/2022): Pain currently not at tolerable rate  Weekly Goal(09/20/2022): Pain will be managed at tolerable rate  Discharge Goal: Patient will be able to tolerate pain and manage    Performed Intervention(s)  Pain medication as requested and available  Repositioning and wound care      Psychosocial    [RN] Coping/Adjustment(Active)  Current Status(09/13/2022): Pt is adjusting to bowel resection/colostomy  Weekly Goal(09/17/2022): Patient adjusted to new bowel regimen  Discharge Goal: Patient coping well with new colostomy    Performed Intervention(s)  supportive family  Pt given opportunity to express concerns/feelings      Safety    [RN] Potential for Injury(Active)  Current Status(09/13/2022): Patient at risk for falls due to weakness from  abdominal surger  Weekly Goal(09/20/2022): Patient will use call light appropriately  Discharge Goal: Patient will be aware of risk of falls    Performed Intervention(s)  Personal items and call light w/in reach  falls precaution  hourly rounding      Sphincter Control    [RN] Bladder Management(Active)  Current Status(09/13/2022): Patient using external catheter for bedtime  Weekly Goal(09/20/2022): Pt continent of bladder  Discharge Goal: Pt continent 100%    Performed Intervention(s)  Incontinance products and elo care  answer call light promptly  WCON consult  skin/stoma assessment    Signed by: Massimo Lopez RN

## 2022-09-13 NOTE — THERAPY TREATMENT NOTE
Inpatient Rehabilitation - Occupational Therapy Treatment Note    [facility]     Patient Name: Summer Smith  : 1950  MRN: [id number]    Today's Date: 2022                 Admit Date: 2022         ICD-10-CM ICD-9-CM   1. Follow-up exam  Z09 V67.9       Patient Active Problem List   Diagnosis   • Aneurysm (HCC)       Past Medical History:   Diagnosis Date   • COPD (chronic obstructive pulmonary disease) (HCC)    • Elevated cholesterol    • GERD (gastroesophageal reflux disease)        Past Surgical History:   Procedure Laterality Date   • CARDIAC CATHETERIZATION     • COLON SURGERY     • COLONOSCOPY     • SKIN BIOPSY               IRF OT ASSESSMENT FLOWSHEET (last 12 hours)     IRF OT Evaluation and Treatment     Row Name 22 1618 22 1204       OT Time and Intention    Document Type daily treatment  -DN progress note  -DN    Mode of Treatment occupational therapy  -DN occupational therapy  -DN    Patient Effort good  -DN good  -DN    Row Name 22 1204          Pain Assessment    Pretreatment Pain Rating 5/10  -DN     Posttreatment Pain Rating 5/10  -DN     Pain Location incisional  -DN     Pain Location - abdomen  -DN     Row Name 22 1204          Cognition/Psychosocial    Affect/Mental Status (Cognition) flat/blunted affect  -DN     Follows Commands (Cognition) follows one-step commands;over 90% accuracy;initiation impaired;physical/tactile prompts required;increased processing time needed  -DN     Personal Safety Interventions fall prevention program maintained;gait belt  -DN     Row Name 22 1204          Bathing    [place] Level (Bathing) bathing skills;moderate assist (50% patient effort);nonverbal cues (demo/gesture);verbal cues  -DN     Position (Bathing) supported standing;supported sitting  -DN     Set-up Assistance (Bathing) obtain supplies  -DN     Comment (Bathing) sponge bath at sink  -DN     Row Name 22 1204          Upper Body Dressing     [place] Level (Upper Body Dressing) upper body dressing skills;minimum assist (75% or more patient effort);verbal cues;nonverbal cues (demo/gesture);pull over garment  -DN     Position (Upper Body Dressing) supported sitting  -DN     Set-up Assistance (Upper Body Dressing) obtain clothing  -DN     Comment (Upper Body Dressing) no bra at this time  -DN     Row Name 09/13/22 1204          Lower Body Dressing    [place] Level (Lower Body Dressing) doff;don;pants/bottoms;shoes/slippers;socks;underwear;dependent (less than 25% patient effort)  -DN     Position (Lower Body Dressing) supported sitting;supported standing  -DN     Set-up Assistance (Lower Body Dressing) obtain clothing  -DN     Row Name 09/13/22 1204          Grooming    [place] Level (Grooming) grooming skills;hair care, combing/brushing;oral care regimen;standby assist  -DN     Position (Grooming) supported sitting  -DN     Set-up Assistance (Grooming) obtain supplies  -DN     Row Name 09/13/22 1618          Toileting    [place] Level (Toileting) toileting skills;adjust/manage clothing;perform perineal hygiene;verbal cues;nonverbal cues (demo/gesture);maximum assist (25% patient effort)  -DN     Assistive Device Use (Toileting) grab bar/safety frame;raised toilet seat  -DN     Position (Toileting) supported standing;supported sitting  -DN     Row Name 09/13/22 1618 09/13/22 1204       Transfers    Bed-Chair [place] (Transfers) -- moderate assist (50% patient effort)  -DN    Chair-Bed [place] (Transfers) moderate assist (50% patient effort);verbal cues;nonverbal cues (demo/gesture)  -DN --    [place] Level (Toilet Transfer) moderate assist (50% patient effort);verbal cues;nonverbal cues (demo/gesture)  -DN --    Assistive Device (Toilet Transfer) raised toilet seat;grab bars/safety frame;wheelchair  -DN --    Row Name 09/13/22 1204          Bed-Chair Transfer    Comment, (Bed-Chair Transfer) stand pivot sit bed to w/c  -DN      Row Name 09/13/22 1618          Chair-Bed Transfer    Assistive Device (Chair-Bed Transfers) wheelchair  -DN     Row Name 09/13/22 1618          Toilet Transfer    Type (Toilet Transfer) stand pivot/stand step  -DN     Row Name 09/13/22 1618          Motor Skills    Functional Endurance fair -  -DN     Row Name 09/13/22 1618          Shoulder (Therapeutic Exercise)    Shoulder (Therapeutic Exercise) strengthening exercise;wand exercises  -DN     Shoulder AROM (Therapeutic Exercise) bilateral;10 repetitions;3 sets  -DN     Shoulder Strengthening (Therapeutic Exercise) 1 lb free weight  -DN     Row Name 09/13/22 1618 09/13/22 1204       Positioning and Restraints    Pre-Treatment Position sitting in chair/recliner  -DN sitting in chair/recliner  -DN    Post Treatment Position bed  -DN wheelchair  -DN    In Bed supine;call light within reach;encouraged to call for assist;exit alarm on;with family/caregiver  -DN sitting;call light within reach;encouraged to call for assist  -DN          User Key  (r) = Recorded By, (t) = Taken By, (c) = Cosigned By    Initials Name Effective Dates    Sina Currie OT 06/16/21 -                          OT Recommendation and Plan                         Time Calculation:      Time Calculation- OT     Row Name 09/13/22 1400 09/13/22 0830          Time Calculation- OT    OT Start Time 1400  -DN 0830  -DN     OT Stop Time 1430  -DN 0900  -DN     OT Time Calculation (min) 30 min  -DN 30 min  -DN           User Key  (r) = Recorded By, (t) = Taken By, (c) = Cosigned By    Initials Name Provider Type    Sina Currie OT Occupational Therapist              Therapy Charges for Today     Code Description Service Date Service Provider Modifiers Qty    27326330137 HC OT SELF CARE/MGMT/TRAIN EA 15 MIN 9/12/2022 Sina Walls OT GO 2    53176241422 HC OT SELF CARE/MGMT/TRAIN EA 15 MIN 9/13/2022 Sina Walls OT GO 2    16883827219 HC OT SELF CARE/MGMT/TRAIN EA 15 MIN 9/13/2022  Sina Walls, OT GO 1    [id number]  OT THER PROC EA 15 MIN 9/13/2022 Sina Walls OT GO 1                   Sina Walls OT  9/13/2022

## 2022-09-13 NOTE — PROGRESS NOTES
Discussed progress, weekly and d/c goals, and ELOS per team conference report with patient and . They are agreeable to plan. Patient acknowledges trouble with ST tasks and stated she doesn't know why she is having this trouble as she did not have before hospitalization. She is motivated to continue to work toward her goal of regaining her independence. Will assist with plans.

## 2022-09-13 NOTE — PLAN OF CARE
Goal Outcome Evaluation:           Progress: improving  Outcome Evaluation: A&OX4. Calm, cooperative, and pleasant. Forgetful at times.  at bedside. Participated fully in therapy. Aneurysm. Abdominal incision. Lower part of incision slight drainage. R. elbow has bruises and sutures. wound care completed y/d. Colostomy to L. lower abdomen. Wound care changed the bag today. One-piece extra appliances in her room. Wears glasses. Is on O2 at all times. Not requiring 4L during the day. Decreased to 2L. Steri-strips present to abdomen. Norco 5 mg q 4 hours for pain. Continent bladder during the day. Added humidifed air. Purewick d/c during the night. Diet: soft, ground, thins. Meds whole with water. Can use bedside commode.

## 2022-09-13 NOTE — PROGRESS NOTES
Inpatient Rehabilitation Plan of Care Note    Plan of Care  Updated Problems/Interventions  Mobility    [PT] Bed/Chair/Wheelchair(Active)  Current Status(09/13/2022): Mod A  Weekly Goal(09/20/2022): Min A  Discharge Goal: CGA    [PT] Walk(Active)  Current Status(09/13/2022): 8' // bars, Mod A w WC follow  Weekly Goal(09/20/2022): PT only  Discharge Goal: 80' CGA, RWx    Signed by: Ale Garza, PT

## 2022-09-13 NOTE — PLAN OF CARE
Goal Outcome Evaluation:  Plan of Care Reviewed With: patient        Progress: improving  Outcome Evaluation: Ms Wheeler rested very well this shift.  Daughter at bedside all light.  Purewick in place.  Pt voiding well.  All meds whole w/water.  Lower abdominal incision w/scant drainage.  Dressing in place.  Colostomy in place, w/small output early this shift.  Pain meds administered early in shift for incisional pain.  Only 1 norco administered.  Pt up to bedside commode early in shift, assist x2.  Pt needs cueing. O2 2L NC@ all times

## 2022-09-13 NOTE — THERAPY TREATMENT NOTE
Inpatient Rehabilitation - Speech Language Pathology Treatment Note  [facility]     Patient Name: Summer Smith  : 1950  MRN: [id number]  Today's Date: 2022               Admit Date: 2022     Visit Dx:    ICD-10-CM ICD-9-CM   1. Follow-up exam  Z09 V67.9     Patient Active Problem List   Diagnosis   • Aneurysm (HCC)     Past Medical History:   Diagnosis Date   • COPD (chronic obstructive pulmonary disease) (HCC)    • Elevated cholesterol    • GERD (gastroesophageal reflux disease)      Past Surgical History:   Procedure Laterality Date   • CARDIAC CATHETERIZATION     • COLON SURGERY     • COLONOSCOPY     • SKIN BIOPSY         SLP Recommendation and Plan         SLP EVALUATION (last 72 hours)     SLP SLC Evaluation     Row Name 22          Document Type therapy note (daily note)  -ML evaluation  -SR    Subjective Information no complaints  -ML no complaints  -SR    Patient Observations alert;cooperative  -ML alert;cooperative;agree to therapy  -SR    Patient/Family/Caregiver Comments/Observations Pt seen in pt room sitting up in wheelchair.  -ML --    Patient Effort good  -ML good  -SR    Symptoms Noted During/After Treatment none  -ML fatigue  -SR                Patient Profile Reviewed -- yes  -SR    Pertinent History Of Current Problem -- 72 yo female; L MCA aneurysm, multi focal bilat basilar pneumonia, diverticulitis with microperforation and small abscess s/p ex lap with bowel resection and colostomy  -SR    Precautions/Limitations, Vision -- WFL with corrective lenses;for purposes of eval  -SR    Precautions/Limitations, Hearing -- WFL;for purposes of eval  -SR    Plans/Goals Discussed with -- patient;agreed upon  -SR    Barriers to Rehab -- none identified  -SR    Patient's Goals for Discharge -- return to home  -SR    Standardized Assessment Used -- CLQT  -SR                Pretreatment Pain Rating -- 5/10  -SR    Posttreatment Pain Rating -- 5/10  -SR     Pain Location -- incisional  -SR    Pain Location - -- abdomen  -SR                Cognitive/Memory Tests -- CLQT: Cognitive Linguistic Quick Test  -SR                Attention Domain Score -- 123  -SR    Attention Severity Rating -- 3: Mild  -SR    Memory Domain Score -- 140  -SR    Memory Severity Rating -- 3: Mild  -SR    Executive Function Domain Score -- 7  -SR    Executive Function Severity Rating -- 1: Severe  -SR    Language Domain Score -- 27  -SR    Language Severity Rating -- 3: Mild  -SR    Visuospatial Domain Score -- 44  -SR    Visuospatial Severity Rating -- 3: Mild  -SR    Clock Drawing Total Score -- 5  -SR    Clock Drawing Severity Rating -- Severe  -SR    Composite Severity Rating -- 2.6  -SR    Composite Severity Rating Range -- 3.4 - 2.5: Mild  -SR    CLQT Comments -- The Cognitive Linguistic Quick Test (CLQT) was completed to further assess patient's strengths and weaknesses. Patient received a score of 2.6/4.0 indicating mild cognitive deficits. The results of the assessment were as followed: Attention - Mild, Memory - Mild, Executive Functions - Severe, Language -Mild, and Visuospatial skills -Mild.  Patient exhibited strengths in confrontational naming, visual and auditory memory, and recalling personal facts. She had difficulty with word retrieval (i.e, generative naming), mental manipulation, and visual attention. During the clock drawing subtest, patient perseverated the number 12 once and arranged numbers on the right side of the clock. Difficulty with visuospatial skills was also observed during the mazes and design generation subtest. Patient showed decreased awareness toward cognitive deficits. Agreeable to speech therapy activities to target visual attention, organization, mental manipulation, and executive functioning skills (i.e, planning, deficit awareness).  -SR                SLP Diagnosis -- mild-moderate cognitive impairment  -SR    Rehab Potential/Prognosis -- good  -SR     SLC Criteria for Skilled Therapy Interventions Met -- yes  -SR    Functional Impact -- difficulty completing home management task;difficulty completing vocational tasks  -SR                Therapy Frequency (SLP SLC) -- 5 days per week  -SR    Predicted Duration Therapy Intervention (Days) -- until discharge  -SR    Anticipated Discharge Disposition (SLP) -- home with assist;anticipate therapy at next level of care  -SR                Attention Selection -- attention, SLP goal 1  -SR    Memory Skills Selection -- memory skills, SLP goal 1  -SR    Executive Function Skills Selection -- executive function skills, SLP goal 1  -SR                Improve Attention by Goal 1 (SLP) -- attending to task;complete selective attention task;80%;independently (over 90% accuracy)  -SR                Improve Memory Skills Through Goal 1 (SLP) -- repeat list in sequential order;listen to a paragraph and answer questions;80%;independently (over 90% accuracy)  -SR    Time Frame (Memory Skills Goal 1, SLP) -- by discharge  -SR                Improve Problem Solving Through Goal 1 (SLP) -- determine solutions to simple ADL/safety problems;determine solutions to multifactorial problems;sequence steps in a task;complete organization/home management task;70%;independently (over 90% accuracy)  -SR    Time Frame (Problem Solving Goal 1, SLP) -- by discharge  -SR                Improve Executive Function Skills Goal 1 (SLP) -- demonstrate awareness of deficit;identify strategies, strengths, limitations;time management activity;complex organization/planning activity;home management activity;70%;independently (over 90% accuracy)  -SR    Time Frame (Executive Function Skills Goal 1, SLP) -- by discharge  -SR          User Key  (r) = Recorded By, (t) = Taken By, (c) = Cosigned By    Initials Name Effective Dates    Vaishali Davis, MS CCC-SLP 06/16/21 -     SR Marlen Ochoa SLP 01/12/22 -                    EDUCATION  The patient has  been educated in the following areas:     Cognitive Impairment.           SLP GOALS     Row Name 09/13/22 1000 09/12/22 0900          Attention Goal 1 (SLP)    Improve Attention by Goal 1 (SLP) attending to task;complete selective attention task;80%;independently (over 90% accuracy)  - attending to task;complete selective attention task;80%;independently (over 90% accuracy)  -SR     Time Frame (Attention Goal 1, SLP) by discharge  -ML --     Progress (Attention Goal 1, SLP) with moderate cues (50-74%)  -ML --     Progress/Outcomes (Attention Goal 1, SLP) goal ongoing  -ML --            Memory Skills Goal 1 (SLP)    Improve Memory Skills Through Goal 1 (SLP) -- repeat list in sequential order;listen to a paragraph and answer questions;80%;independently (over 90% accuracy)  -SR     Time Frame (Memory Skills Goal 1, SLP) -- by discharge  -SR            Organizational Skills Goal 1 (SLP)    Improve Thought Organization Through Goal 1 (SLP) completing mental manipulation task  -ML --     Time Frame (Thought Organization Skills Goal 1, SLP) by discharge  -ML --     Progress (Thought Organization Skills Goal 1, SLP) with 1:1 supervision/constant cues  -ML --     Progress/Outcomes (Thought Organization Skills Goal 1, SLP) goal ongoing  -ML --     Comment (Thought Organization Skills Goal 1, SLP) Task of seperating the combines words in each item of letters: 1/10 independently. Pt required cues ranging from min-total assist for other 9/10.  -ML --            Functional Problem Solving Skills Goal 1 (SLP)    Improve Problem Solving Through Goal 1 (SLP) -- determine solutions to simple ADL/safety problems;determine solutions to multifactorial problems;sequence steps in a task;complete organization/home management task;70%;independently (over 90% accuracy)  -SR     Time Frame (Problem Solving Goal 1, SLP) -- by discharge  -SR            Functional Math Skills Goal 1 (SLP)    Improve Functional Math Skills Through Goal 1 (SLP)  complete word problems involving time;with minimal cues (75-90%)  -ML --     Time Frame (Functional Math Skills Goal 1, SLP) by discharge  -ML --     Progress (Functional Math Skills Goal 1, SLP) with maximum cues (25-49%)  -ML --     Progress/Outcomes (Functional Math Skills Goal 1, SLP) goal ongoing  -ML --     Comment (Functional Math Skills Goal 1, SLP) 3/7 independently, pt required mod assit for 4/7 and total assist for 7/7. 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.)  -ML --            Executive Functional Skills Goal 1 (SLP)    Improve Executive Function Skills Goal 1 (SLP) demonstrate awareness of deficit;identify strategies, strengths, limitations;time management activity;complex organization/planning activity;home management activity;70%;independently (over 90% accuracy)  -ML demonstrate awareness of deficit;identify strategies, strengths, limitations;time management activity;complex organization/planning activity;home management activity;70%;independently (over 90% accuracy)  -SR     Time Frame (Executive Function Skills Goal 1, SLP) by discharge  -ML by discharge  -SR     Progress (Executive Function Skills Goal 1, SLP) with moderate cues (50-74%)  -ML --     Progress/Outcomes (Executive Function Skills Goal 1, SLP) goal ongoing  -ML --     Comment (Executive Function Skills Goal 1, SLP) Organizing functional information task: writing correct phone numbers related to given information- 4/7 independently, pt required mod-max cues for 100%.  -ML --            Right Hemisphere Function Goal 1 (SLP)    Improve Right Hemisphere Function Through Goal 1 (SLP) complete visuo-spatial activities (visual closure, trail making, mazes;with minimal cues (75-90%)  -ML --     Time Frame (Right Hemisphere Function Goal 1, SLP) by discharge  -ML --     Progress (Right Hemisphere Function Goal 1, SLP) with 1:1  supervision/constant cues  -ML --     Progress/Outcomes (Right Hemisphere Function Goal 1, SLP) goal ongoing  -ML --     Comment (Right Hemisphere Function Goal 1, SLP) Pt required max-total assist to draw continuous line alternating from number to letter in ascending order. Only 25% of task was completed.  -ML --           User Key  (r) = Recorded By, (t) = Taken By, (c) = Cosigned By    Initials Name Provider Type    Vaishali Davis MS CCC-SLP Speech and Language Pathologist    Marlen Becerra, SLP Speech and Language Pathologist                SLP Outcome Measures (last 72 hours)     SLP Outcome Measures     Row Name 09/10/22 1200             SLP Outcome Measures    Outcome Measure Used? Adult NOMS  -LS              Adult FCM Scores    FCM Chosen Swallowing  -LS      Swallowing FCM Score 4  -LS            User Key  (r) = Recorded By, (t) = Taken By, (c) = Cosigned By    Initials Name Effective Dates    Geovany Tolliver MS CCC-SLP 06/16/21 -                     Time Calculation:      Time Calculation- SLP     Row Name 09/13/22 1033             Time Calculation- SLP    SLP Start Time 0900  -ML      SLP Stop Time 1000  -ML      SLP Time Calculation (min) 60 min  -ML      SLP Received On 09/13/22  -ML            User Key  (r) = Recorded By, (t) = Taken By, (c) = Cosigned By    Initials Name Provider Type    Vaishali Davis MS CCC-SLP Speech and Language Pathologist                Therapy Charges for Today     Code Description Service Date Service Provider Modifiers Qty    51188972498 HC ST DEV OF COGN SKILLS INITIAL 15 MIN 9/13/2022 Vaishali Rodgers MS CCC-SLP  1    89411043669 HC ST DEV OF COGN SKILLS EACH ADDT'L 15 MIN 9/13/2022 Vaishali Rodgers MS CCC-SLP  3          ADULT NOMS (last 72 hours)     Adult NOMS     Row Name 09/10/22 1200                   Adult FCM Scores    FCM Chosen Swallowing  -LS        Swallowing FCM Score 4  -LS              User Key  (r) = Recorded By, (t) =  Taken By, (c) = Cosigned By    Initials Name Effective Dates    Geovany Tolliver, MS CCC-SLP 06/16/21 -                        Vaishali Rodgers, MS CCC-SLP  9/13/2022

## 2022-09-13 NOTE — THERAPY TREATMENT NOTE
Inpatient Rehabilitation - Occupational Therapy Treatment Note    [facility]     Patient Name: Summer Smith  : 1950  MRN: [id number]    Today's Date: 2022                 Admit Date: 2022         ICD-10-CM ICD-9-CM   1. Follow-up exam  Z09 V67.9       Patient Active Problem List   Diagnosis   • Aneurysm (HCC)       Past Medical History:   Diagnosis Date   • COPD (chronic obstructive pulmonary disease) (HCC)    • Elevated cholesterol    • GERD (gastroesophageal reflux disease)        Past Surgical History:   Procedure Laterality Date   • CARDIAC CATHETERIZATION     • COLON SURGERY     • COLONOSCOPY     • SKIN BIOPSY               IRF OT ASSESSMENT FLOWSHEET (last 12 hours)     IRF OT Evaluation and Treatment     Row Name 22 1204          OT Time and Intention    Document Type progress note  -DN     Mode of Treatment occupational therapy  -DN     Patient Effort good  -DN     Row Name 22 1204          Pain Assessment    Pretreatment Pain Rating 5/10  -DN     Posttreatment Pain Rating 5/10  -DN     Pain Location incisional  -DN     Pain Location - abdomen  -DN     Row Name 22 1204          Cognition/Psychosocial    Affect/Mental Status (Cognition) flat/blunted affect  -DN     Follows Commands (Cognition) follows one-step commands;over 90% accuracy;initiation impaired;physical/tactile prompts required;increased processing time needed  -DN     Personal Safety Interventions fall prevention program maintained;gait belt  -DN     Row Name 22 1204          Bathing    [place] Level (Bathing) bathing skills;moderate assist (50% patient effort);nonverbal cues (demo/gesture);verbal cues  -DN     Position (Bathing) supported standing;supported sitting  -DN     Set-up Assistance (Bathing) obtain supplies  -DN     Comment (Bathing) sponge bath at sink  -DN     Row Name 22 1204          Upper Body Dressing    [place] Level (Upper Body Dressing) upper body dressing  skills;minimum assist (75% or more patient effort);verbal cues;nonverbal cues (demo/gesture);pull over garment  -DN     Position (Upper Body Dressing) supported sitting  -DN     Set-up Assistance (Upper Body Dressing) obtain clothing  -DN     Comment (Upper Body Dressing) no bra at this time  -DN     Row Name 09/13/22 1204          Lower Body Dressing    [place] Level (Lower Body Dressing) doff;don;pants/bottoms;shoes/slippers;socks;underwear;dependent (less than 25% patient effort)  -DN     Position (Lower Body Dressing) supported sitting;supported standing  -DN     Set-up Assistance (Lower Body Dressing) obtain clothing  -DN     Row Name 09/13/22 1204          Grooming    [place] Level (Grooming) grooming skills;hair care, combing/brushing;oral care regimen;standby assist  -DN     Position (Grooming) supported sitting  -DN     Set-up Assistance (Grooming) obtain supplies  -DN     Row Name 09/13/22 1204          Transfers    Bed-Chair [place] (Transfers) moderate assist (50% patient effort)  -DN     Row Name 09/13/22 1204          Bed-Chair Transfer    Comment, (Bed-Chair Transfer) stand pivot sit bed to w/c  -DN     Row Name 09/13/22 1204          Positioning and Restraints    Pre-Treatment Position sitting in chair/recliner  -DN     Post Treatment Position wheelchair  -DN     In Bed sitting;call light within reach;encouraged to call for assist  -DN           User Key  (r) = Recorded By, (t) = Taken By, (c) = Cosigned By    Initials Name Effective Dates    Sina Currie OT 06/16/21 -                          OT Recommendation and Plan                         Time Calculation:      Time Calculation- OT     Row Name 09/13/22 0830             Time Calculation- OT    OT Start Time 0830  -DN      OT Stop Time 0900  -DN      OT Time Calculation (min) 30 min  -DN            User Key  (r) = Recorded By, (t) = Taken By, (c) = Cosigned By    Initials Name Provider Type    Sina Currie OT  Occupational Therapist              Therapy Charges for Today     Code Description Service Date Service Provider Modifiers Qty    28634774307 HC OT SELF CARE/MGMT/TRAIN EA 15 MIN 9/12/2022 Sina Walls OT GO 2    26141590409 HC OT SELF CARE/MGMT/TRAIN EA 15 MIN 9/13/2022 Sina Walls OT GO 2                   Sina Walls OT  9/13/2022

## 2022-09-13 NOTE — PROGRESS NOTES
Inpatient Rehabilitation Functional Measures Assessment and Plan of Care    Plan of Care  Updated Problems/Interventions  Mobility    [OT] Toilet Transfers(Active)  Current Status(09/12/2022): Min/Mod  Weekly Goal(09/23/2022): Min  Discharge Goal: SBA    [OT] Tub/Shower Transfers(Active)  Current Status(09/12/2022): TBA  Weekly Goal(09/23/2022): TBA  Discharge Goal: TBA        Self Care    [OT] Bathing(Active)  Current Status(09/12/2022): Mod  Weekly Goal(09/23/2022): Min  Discharge Goal: SBA    [OT] Dressing (Lower)(Active)  Current Status(09/12/2022): Dep  Weekly Goal(09/23/2022): Mod with AE  Discharge Goal: min A    [OT] Dressing (Upper)(Active)  Current Status(09/12/2022): Min  Weekly Goal(09/23/2022): SBA  Discharge Goal: SBA    [OT] Eating(Active)  Current Status(09/12/2022): min A  Weekly Goal(09/23/2022): SBA  Discharge Goal: set up    [OT] Grooming(Active)  Current Status(09/12/2022): Min A  Weekly Goal(09/23/2022): Setup  Discharge Goal: set up    [OT] Toileting(Active)  Current Status(09/12/2022): Dep  Weekly Goal(09/23/2022): max A  Discharge Goal: min A    Functional Measures  NIKKI Eating:  Branch  NIKKI Grooming: Branch  NIKKI Bathing:  Branch  NIKKI Upper Body Dressing:  Branch  NIKKI Lower Body Dressing:  Branch  NIKKI Toileting:  Branch    NIKKI Bladder Management  Level of Assistance:  Branch  Frequency/Number of Accidents this Shift:  Branch    NIKKI Bowel Management  Level of Assistance: Branch  Frequency/Number of Accidents this Shift: Branch    NIKKI Bed/Chair/Wheelchair Transfer:  Branch  NIKKI Toilet Transfer:  Branch  NIKKI Tub/Shower Transfer:  Branch    Previously Documented Mode of Locomotion at Discharge: Field  NIKKI Expected Mode of Locomotion at Discharge: Branch  NIKKI Walk/Wheelchair:  Branch  NIKKI Stairs:  Branch    NIKKI Comprehension:  Branch  NIKKI Expression:  Branch  NIKKI Social Interaction:  Branch  NIKKI Problem Solving:  Branch  NIKKI Memory:  Branch    Therapy Mode Minutes  Occupational Therapy:  Branch  Physical Therapy: Branch  Speech Language Pathology:  Branch    Signed by: Sina Walls OTR/L

## 2022-09-13 NOTE — PROGRESS NOTES
Case Management  Inpatient Rehabilitation Team Conference    Conference Date/Time: 9/13/2022 9:03:28 AM    Team Conference Attendees:  Dr. Hossein Moreno, MSSW  Ale Garza, PT  Sina Walls, OT  Marlen Ochoa, SLP  Veronika Emmanuel, CTRS  Mara Chappell RD, LD  Elisha Chaparro, VAISHALI Junior, RN  Chaplain Hari    Demographics            Age: 71Y            Gender: Female    Admission Date: 9/9/2022 9:57:00 PM  Rehabilitation Diagnosis:  Immobility Syndrome  Past Medical History: +?  PMH-COPD- was not on 02  GERD  T/A surgery  Inner ear surgery  Tubal ligation  ?      Plan of Care  Anticipated Discharge Date/Estimated Length of Stay: 4 weeks  Anticipated Discharge Destination: Community discharge with assistance  Discharge Plan : Patient lives with  in one story home with ramp  entrance. One step from porch into home.  D/C plan is home with  and intermittent assist from daughters who live  close by.  Medical Necessity Expected Level Rationale: Goals are to acheieve a level of  contact guard assist with ambulation and ADL's.  Rehab prognosis fair.  Medical  prognosis fair.  Intensity and Duration: an average of 3 hours/5 days per week  Medical Supervision and 24 Hour Rehab Nursing: x  Physical Therapy: x  PT Intensity/Duration: 1 hr / day, 5 days / week, for approximately 4 weeks.  Occupational Therapy: x  OT Intensity/Duration: 1 hr / day, 5 days / week, for approximately 4 weeks.  Speech and Language Therapy: x  SLP Intensity/Duration: 1 hr / day, 5 days / week, for approximately 4 weeks.  Social Work: x  Therapeutic Recreation: x  Psychology: x  Registered Dietician: x  Updated (if changes indicated)    Anticipated Discharge Date/Estimated Length of Stay:   3 weeks    Based on the patient's medical and functional status, their prognosis and  expected level of functional improvement is: Goals are to acheieve a level of  contact guard assist with ambulation and ADL's.  Rehab prognosis  fair.  Medical  prognosis fair.      Interdisciplinary Problem/Goals/Status    All Rehab Problems:  Cognition    [ST] Executive Functions(Active)  Current Status(09/12/2022): Mild-moderate cognitive impairment characterized by  difficulty with visual attention, mental manipulation, organization, and  executive functioning skills (planning,deficit awareness)  Weekly Goal(09/19/2022): Pt will use call light appropriately; recall details of  the day; participate in structured therapy activities given min-mod cues  Discharge Goal: Functional executive functioning skills for home        Mobility    [OT] Toilet Transfers(Active)  Current Status(09/12/2022): Min/Mod  Weekly Goal(09/23/2022): Min  Discharge Goal: SBA    [OT] Tub/Shower Transfers(Active)  Current Status(09/12/2022): TBA  Weekly Goal(09/23/2022): TBA  Discharge Goal: TBA    [PT] Bed/Chair/Wheelchair(Active)  Current Status(09/13/2022): Mod A  Weekly Goal(09/20/2022): Min A  Discharge Goal: CGA    [PT] Walk(Active)  Current Status(09/13/2022): 8' // bars, Mod A w WC follow  Weekly Goal(09/20/2022): PT only  Discharge Goal: 80' CGA, RWx        Pain    [RN] Pain Management(Active)  Current Status(09/13/2022): Pain currently not at tolerable rate  Weekly Goal(09/20/2022): Pain will be managed at tolerable rate  Discharge Goal: Patient will be able to tolerate pain and manage        Psychosocial    [RN] Coping/Adjustment(Active)  Current Status(09/13/2022): Pt is adjusting to bowel resection/colostomy  Weekly Goal(09/17/2022): Patient adjusted to new bowel regimen  Discharge Goal: Patient coping well with new colostomy        Safety    [RN] Potential for Injury(Active)  Current Status(09/13/2022): Patient at risk for falls due to weakness from  abdominal surger  Weekly Goal(09/20/2022): Patient will use call light appropriately  Discharge Goal: Patient will be aware of risk of falls        Self Care    [OT] Bathing(Active)  Current Status(09/12/2022): Mod  Weekly  Goal(09/23/2022): Min  Discharge Goal: SBA    [OT] Dressing (Lower)(Active)  Current Status(09/12/2022): Dep  Weekly Goal(09/23/2022): Mod with AE  Discharge Goal: min A    [OT] Dressing (Upper)(Active)  Current Status(09/12/2022): Min  Weekly Goal(09/23/2022): SBA  Discharge Goal: SBA    [OT] Eating(Active)  Current Status(09/12/2022): min A  Weekly Goal(09/23/2022): SBA  Discharge Goal: set up    [OT] Grooming(Active)  Current Status(09/12/2022): Min A  Weekly Goal(09/23/2022): Setup  Discharge Goal: set up    [OT] Toileting(Active)  Current Status(09/12/2022): Dep  Weekly Goal(09/23/2022): max A  Discharge Goal: min A        Sphincter Control    [RN] Bladder Management(Active)  Current Status(09/13/2022): Patient using external catheter for bedtime  Weekly Goal(09/20/2022): Pt continent of bladder  Discharge Goal: Pt continent 100%    [RN] Bowel Management(Active)  Current Status(09/13/2022): Patient has a new colostomy  Weekly Goal(09/20/2022): Patient will maintain skin integrity around stoma  Discharge Goal: Patient will demonstrate techniques to maintain skin integrity  and change appliance independently        Swallow Function    [ST] Swallowing(Active)  Current Status(09/12/2022): Mechanical soft / thin liquids; medication whole  with thin liquid  Weekly Goal(09/19/2022): Patient will tolerate least restrictive diet with no  overt s/sx of aspiration or penetration.  Discharge Goal: Patient will tolerate least restrictive diet with no overt s/sx  of aspiration or penetration.        Comments: 9/13 Mod A x 1 for transfers, amb 8' in parallel bars Mod A w/ wc  follow.  Min - Mod A toilet transfers.  Slow processing, follows commands well.  Toileting dependent.  Mild - Mod cognitive impairment.  Mech soft w/ thin  liquids.  Goal to discontinue purewick and encourage toileting schedule.  Patient w/ new colostomy.  Progressively decreasing oxygen.  PRN pain  medication.    Signed by: Elisha Chaparro RN    Physician  CoSigned By: Hossein Rivers 09/13/2022 09:27:23

## 2022-09-13 NOTE — THERAPY TREATMENT NOTE
Inpatient Rehabilitation - Physical Therapy Treatment Note       [facility]     Patient Name: Summer Smith  : 1950  MRN: [id number]    Today's Date: 2022                    Admit Date: 2022      Visit Dx:     ICD-10-CM ICD-9-CM   1. Follow-up exam  Z09 V67.9       Patient Active Problem List   Diagnosis   • Aneurysm (HCC)       Past Medical History:   Diagnosis Date   • COPD (chronic obstructive pulmonary disease) (HCC)    • Elevated cholesterol    • GERD (gastroesophageal reflux disease)        Past Surgical History:   Procedure Laterality Date   • CARDIAC CATHETERIZATION     • COLON SURGERY     • COLONOSCOPY     • SKIN BIOPSY         PT ASSESSMENT (last 12 hours)     IRF PT Evaluation and Treatment     Row Name 22 1113          PT Time and Intention    Document Type daily treatment  -MG     Mode of Treatment individual therapy;physical therapy  -MG     Total Minutes, Physical Therapy 30  -MG     Row Name 22 1113          General Information    Patient Profile Reviewed yes  -MG     General Observations of Patient Pt sitting up in wc in room, pleasant and ready for PT.  -MG     Existing Precautions/Restrictions fall;oxygen therapy device and L/min  colostomy, 2 L O2 nc.  -MG     Limitations/Impairments safety/cognitive  -MG     Row Name 22 1113          Pain Assessment    Pretreatment Pain Rating 5/10  -MG     Posttreatment Pain Rating 5/10  -MG     Pain Location incisional  -MG     Pain Location - abdomen  -MG     Row Name 22 1113          Cognition/Psychosocial    Affect/Mental Status (Cognition) flat/blunted affect  -MG     Orientation Status (Cognition) oriented to;person  Stated being on [address], 2013. Re-oriented to [facility], Sep 2022.  -MG     Follows Commands (Cognition) follows one-step commands;over 90% accuracy;increased processing time needed;repetition of directions required;verbal cues/prompting required  -MG     Personal Safety  Interventions fall prevention program maintained;gait belt;muscle strengthening facilitated;nonskid shoes/slippers when out of bed;supervised activity  -MG     Cognitive Function safety deficit  -MG     Safety Deficit (Cognition) minimal deficit;insight into deficits/self-awareness;safety precautions awareness  -MG     Row Name 09/13/22 1113          Transfers    Sit-Stand [place] (Transfers) moderate assist (50% patient effort);nonverbal cues (demo/gesture);verbal cues  -MG     Stand-Sit [place] (Transfers) verbal cues;minimum assist (75% patient effort)  -MG     Row Name 09/13/22 1113          Sit-Stand Transfer    Assistive Device (Sit-Stand Transfers) parallel bars;wheelchair  -MG     Comment, (Sit-Stand Transfer) x2 for gait. x4 for strengthening - 1st STS pt attempt to push up from wc and was unable to come into standing. 2-3rd STS required ModA and increased time w/ pulling up on parallel bars, able to come into full stand. 4th STS pt unable to come into standing, stated being very tired, feeling weak. Pt had at least 30s-1min seated rest break between each STS.  -MG     Row Name 09/13/22 1113          Stand-Sit Transfer    Assistive Device (Stand-Sit Transfers) wheelchair  -MG     Row Name 09/13/22 1113          Gait/Stairs (Locomotion)    [place] Level (Gait) verbal cues;moderate assist (50% patient effort)  -MG     Assistive Device (Gait) parallel bars  -MG     Distance in Feet (Gait) 8' x 2  -MG     Pattern (Gait) step-through;step-to  -MG     Deviations/Abnormal Patterns (Gait) base of support, narrow;festinating/shuffling;hunter decreased;gait speed decreased;stride length decreased  -MG     Bilateral Gait Deviations forward flexed posture;foot drop/toe drag  -MG     Gait Assessment/Intervention Quick to fatigue, slight SOB. Pt fatigued around 6' then shortens stride length further and increases foot/toe drag. Heavy reliance on BUE's after 6'. Pt audibly SOB after 2nd walk, but SpO2 read  100% while on 2L O2.  -MG     Row Name 09/13/22 1113          Safety Issues, Functional Mobility    Impairments Affecting Function (Mobility) balance;cognition;endurance/activity tolerance;pain;postural/trunk control;strength  -MG     Cognitive Impairments, Mobility Safety/Performance insight into deficits/self-awareness;safety precaution awareness  -MG     Row Name 09/13/22 1113          Balance    Static Sitting Balance standby assist  -MG     Position, Sitting Balance unsupported  sitting edge of chair  -MG     Static Standing Balance minimal assist;moderate assist  -MG     Position/Device Used, Standing Balance supported;parallel bars  -MG     Row Name 09/13/22 1113          Positioning and Restraints    Pre-Treatment Position sitting in chair/recliner  -MG     Post Treatment Position wheelchair  -MG     In Wheelchair sitting;call light within reach;encouraged to call for assist;exit alarm on  -MG     Row Name 09/13/22 1113          Daily Progress Summary (PT)    Daily Progress Summary (PT) Pt able to ambulate 8' in // bars, however quickly fatigues at 6' demoing increased reliance on BUEs as well as increased foot/toe drag and shortened stride length. Pt was SOB after each walk, mainly after 2nd walk, but SpO2 read 100% while on 2L.  -MG           User Key  (r) = Recorded By, (t) = Taken By, (c) = Cosigned By    Initials Name Provider Type    MG Elisabeth Bowen, PT Physical Therapist              Wound 09/09/22 2200 Right upper arm Laceration (Active)   Dressing Appearance dry;intact 09/13/22 0805   Closure Sutures 09/13/22 0805   Base clean;pink 09/13/22 0805   Drainage Amount none 09/13/22 0805       Wound 09/09/22 2200 Left midline abdomen Incision (Active)   Dressing Appearance open to air 09/13/22 0805   Closure Adhesive closure strips 09/13/22 0805   Drainage Amount scant 09/13/22 0805   Care, Wound cleansed with;sterile normal saline 09/13/22 0805   Dressing Care gauze, dry 09/13/22 0805     Physical  Therapy Education                 Title: PT OT SLP Therapies (Done)     Topic: Physical Therapy (Done)     Point: Mobility training (Done)     Learning Progress Summary           Patient Acceptance, E, VU,NR by MG at 9/13/2022 1143    Acceptance, E,TB, VU,NR by EE at 9/10/2022 1518                   Point: Home exercise program (Done)     Learning Progress Summary           Patient Acceptance, E, VU,NR by MG at 9/13/2022 1143    Acceptance, E,TB, VU,NR by EE at 9/10/2022 1518                   Point: Body mechanics (Done)     Learning Progress Summary           Patient Acceptance, E, VU,NR by MG at 9/13/2022 1143    Acceptance, E,TB, VU,NR by EE at 9/10/2022 1518                   Point: Precautions (Done)     Learning Progress Summary           Patient Acceptance, E, VU,NR by MG at 9/13/2022 1143    Acceptance, E, VU by MD at 9/12/2022 1114    Acceptance, E,TB, VU,NR by EE at 9/10/2022 1518                               User Key     Initials Effective Dates Name Provider Type Discipline    EE 06/16/21 -  Rand Patel, PT Physical Therapist PT    MD 06/16/21 -  Ale Garza, PT Physical Therapist PT     05/24/22 -  Elisabeth Bwoen, PT Physical Therapist PT                PT Recommendation and Plan          Daily Progress Summary (PT)  Daily Progress Summary (PT): Pt able to ambulate 8' in // bars, however quickly fatigues at 6' demoing increased reliance on BUEs as well as increased foot/toe drag and shortened stride length. Pt was SOB after each walk, mainly after 2nd walk, but SpO2 read 100% while on 2L.               Time Calculation:      PT Charges     Row Name 09/13/22 1144             Time Calculation    Start Time 1100  -MG      Stop Time 1130  -MG      Time Calculation (min) 30 min  -MG      PT Received On 09/13/22  -MG      PT - Next Appointment 09/14/22  -MG            User Key  (r) = Recorded By, (t) = Taken By, (c) = Cosigned By    Initials Name Provider Type    MG Elisabeth Bowen, PT Physical Therapist                 Therapy Charges for Today     Code Description Service Date Service Provider Modifiers Qty    21246319125  PT THERAPEUTIC ACT EA 15 MIN 9/13/2022 Elisabeth Bowen, PT GP 1    [id number]  GAIT TRAINING EA 15 MIN 9/13/2022 Elisabeth Bowen, PT GP 1    [id number]  PT THER SUPP EA 15 MIN 9/13/2022 Elisabeth Bowen, PT GP 2                   Elisabeth Bowen, PT  9/13/2022

## 2022-09-13 NOTE — PROGRESS NOTES
LOS: 4 days   Patient Care Team:  Wale Jacob MD as PCP - General (Family Medicine)      JASBIR MAHMOOD  1950         ADMITTING DIAGNOSIS:  Immobility syndrome    Subjective     Tolerates therapies. Expected abd soreness    Objective     Vitals:    09/13/22 1330   BP: 116/58   Pulse: 78   Resp: 18   Temp: 98.1 °F (36.7 °C)   SpO2: 94%       PHYSICAL EXAM:     Awake, alert and 0x3  NAD  Heart rr no m/g/r  Lungs cta bilat- is on 02 per nc  abd-  Colostomy  Normoactive bowel sounds.  Soft and nontender  Incision-dressed  Ext: right elbow laceration sutured-dressed  Moving all 4 extremities- generalized weakness  1+ edema in ankles     MEDICATIONS  Scheduled Meds:amLODIPine, 5 mg, Oral, Q24H  atorvastatin, 40 mg, Oral, Nightly  cholecalciferol, 2,000 Units, Oral, Daily  enoxaparin, 40 mg, Subcutaneous, Nightly  lisinopril, 5 mg, Oral, Q24H  pantoprazole, 40 mg, Oral, Q AM  sertraline, 25 mg, Oral, Daily      Continuous Infusions:   PRN Meds:.•  acetaminophen  •  albuterol  •  HYDROcodone-acetaminophen **OR** HYDROcodone-acetaminophen      RESULTS  No results found for: POCGLU  Results from last 7 days   Lab Units 09/13/22  0651   WBC 10*3/mm3 8.05   HEMOGLOBIN g/dL 9.7*   HEMATOCRIT % 30.3*   PLATELETS 10*3/mm3 391           Invalid input(s): LABALBU, PROT        ASSESSMENT and PLAN    Aneurysm (HCC)    Immobility Syndrome  1.  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 Will teach family colostomy care. Pain controlled. Encouraged to at least take pain meds prior to therapy  3.  HTN- stable on norvasc, zestril. Running low. Will decrease lisinopril to 5 mg.   4.  Depression - low dose zoloft  5.  DVT proph- was on lovenox-Will continue 40 mg daily for now  6. Pulm - nasal cannula O2. Wean, keep sats > 90%  September 13-Taper down to 2 L  7. Right lateral elbow laceration - August 31 - sutured      TEAM CONF - SEPT 13 -  TRANSFERS MOD . GAIT 8 FEET PARALLEL BARS MOD ASSIST. TOILET TRANSFERS MIN MOD. BATH MOD. LBD DEP. UBD MIN. EATING MIN. GROOMING MIN. TOILETING DEP. FATIGUES. USING PUREWICK.   OSTOMY EDUCATION. MEPILEX TO RIGHT ELBOW LACERATION. COGNITION - MILD TO MODERATE COGNITIVE IMPAIRMENT. VISUAL SPATIAL DEFICITS. OSTOMY EDUCATION. ABDOMINAL INCISION HEALING WITH JUST ONE SMALL AREA INFERIORLY WITH SLIGHT DRAINAGE. WILL DISCONTINUE PUREWICK. TO DO TIMED VOIDS.  PULMONARY - WEAN O2 .   ELOS - THREE WEEKS    Now admit for comprehensive acute inpatient rehabilitation .  This would be an interdisciplinary program with physical therapy 1.5 hour,  occupational therapy 1.5 hour,  5 days a week.  Rehabilitation nursing for carryover, monitoring of  GI   status, bowel and bladder, and skin  Ongoing physician follow-up.  Weekly team conferences.  Goals are to achieve a level of supervision with  mobility and self-care and improved activity tolerance.   Rehabilitation prognosis air.  Medical prognosis fair.  Estimated length of stay is approximately 10 days , but is only an estimation.     The patient's functional status and clinical status is unchanged from preadmission assessment and the patient continues appropriate for acute inpatient rehabilitation.  Goal is for home with  Home health   therapies.  Barrier to discharge:  Impaired mobility and self care   - work on  Transfers, balance, gait, ADLS  to overcome.             Hossein Rivers MD      During rounds, used appropriate personal protective equipment including mask and gloves.  Additional gown if indicated.  Mask used was standard procedure mask. Appropriate PPE was worn during the entire visit.  Hand hygiene was completed before and after.

## 2022-09-13 NOTE — PROGRESS NOTES
TEAM CONF - SEPT 13 - TRANSFERS MOD . GAIT 8 FEET PARALLEL BARS MOD ASSIST. TOILET TRANSFERS MIN MOD. BATH MOD. LBD DEP. UBD MIN. EATING MIN. GROOMING MIN. TOILETING DEP. FATIGUES. USING PUREWICK.   OSTOMY EDUCATION. MEPILEX TO RIGHT ELBOW LACERATION. COGNITION - MILD TO MODERATE COGNITIVE IMPAIRMENT. VISUAL SPATIAL DEFICITS. OSTOMY EDUCATION. ABDOMINAL INCISION HEALING WITH JUST ONE SMALL AREA INFERIORLY WITH SLIGHT DRAINAGE. WILL DISCONTINUE PUREWICK. TO DO TIMED VOIDS.  PULMONARY - WEAN O2 .   ELOS - THREE WEEKS

## 2022-09-14 PROCEDURE — 97110 THERAPEUTIC EXERCISES: CPT

## 2022-09-14 PROCEDURE — 97129 THER IVNTJ 1ST 15 MIN: CPT

## 2022-09-14 PROCEDURE — 97130 THER IVNTJ EA ADDL 15 MIN: CPT

## 2022-09-14 PROCEDURE — 97535 SELF CARE MNGMENT TRAINING: CPT

## 2022-09-14 PROCEDURE — 97116 GAIT TRAINING THERAPY: CPT

## 2022-09-14 PROCEDURE — 25010000002 ENOXAPARIN PER 10 MG: Performed by: PHYSICAL MEDICINE & REHABILITATION

## 2022-09-14 RX ADMIN — ATORVASTATIN CALCIUM 40 MG: 20 TABLET, FILM COATED ORAL at 20:34

## 2022-09-14 RX ADMIN — HYDROCODONE BITARTRATE AND ACETAMINOPHEN 2 TABLET: 5; 325 TABLET ORAL at 15:09

## 2022-09-14 RX ADMIN — SERTRALINE 25 MG: 25 TABLET, FILM COATED ORAL at 08:24

## 2022-09-14 RX ADMIN — ENOXAPARIN SODIUM 40 MG: 100 INJECTION SUBCUTANEOUS at 20:35

## 2022-09-14 RX ADMIN — HYDROCODONE BITARTRATE AND ACETAMINOPHEN 2 TABLET: 5; 325 TABLET ORAL at 20:34

## 2022-09-14 RX ADMIN — PANTOPRAZOLE SODIUM 40 MG: 40 TABLET, DELAYED RELEASE ORAL at 05:52

## 2022-09-14 RX ADMIN — ACETAMINOPHEN 650 MG: 325 TABLET, FILM COATED ORAL at 06:01

## 2022-09-14 RX ADMIN — Medication 2000 UNITS: at 08:24

## 2022-09-14 NOTE — PROGRESS NOTES
Inpatient Rehabilitation Plan of Care Note    Plan of Care  Care Plan Reviewed - Updates as Follows    Pain    [RN] Pain Management(Active)  Current Status(09/14/2022): Pain currently not at tolerable rate  Weekly Goal(09/20/2022): Pain will be managed at tolerable rate  Discharge Goal: Patient will be able to tolerate pain and manage    Performed Intervention(s)  Pain medication as requested and available  Repositioning and wound care      Psychosocial    [RN] Coping/Adjustment(Active)  Current Status(09/14/2022): Pt is adjusting to bowel resection/colostomy  Weekly Goal(09/17/2022): Patient adjusted to new bowel regimen  Discharge Goal: Patient coping well with new colostomy    Performed Intervention(s)  supportive family  Pt given opportunity to express concerns/feelings      Safety    [RN] Potential for Injury(Active)  Current Status(09/14/2022): Patient at risk for falls due to weakness from  abdominal surger  Weekly Goal(09/20/2022): Patient will use call light appropriately  Discharge Goal: Patient will be aware of risk of falls    Performed Intervention(s)  Personal items and call light w/in reach  falls precaution  hourly rounding      Sphincter Control    [RN] Bladder Management(Active)  Current Status(09/14/2022): Patient using external catheter for bedtime  Weekly Goal(09/20/2022): Pt continent of bladder  Discharge Goal: Pt continent 100%    [RN] Bowel Management(Active)  Current Status(09/14/2022): Patient has a new colostomy  Weekly Goal(09/20/2022): Patient will maintain skin integrity around stoma  Discharge Goal: Patient will demonstrate techniques to maintain skin integrity  and change appliance independently    Performed Intervention(s)  Incontinance products and elo care  answer call light promptly  WCON consult  skin/stoma assessment    Signed by: Myrna Hernandez, RN

## 2022-09-14 NOTE — PLAN OF CARE
Goal Outcome Evaluation:  Plan of Care Reviewed With: patient        Progress: improving  Outcome Evaluation: Ms Wheeler rested well this shift.  C/o pain, requested pain meds x1 due to incisional pain.  daughter at bedside.  Pt stand/pivot to bedside commode this shift x2 w/1-2 assist.  Cueing required.  Colostomy intact.  Abd incision no drainage.

## 2022-09-14 NOTE — THERAPY TREATMENT NOTE
Inpatient Rehabilitation - Speech Language Pathology Treatment Note    [facility]     Patient Name: Summer Smith  : 1950  MRN: [id number]    Today's Date: 2022                   Admit Date: 2022       Visit Dx:      ICD-10-CM ICD-9-CM   1. Follow-up exam  Z09 V67.9       Patient Active Problem List   Diagnosis   • Aneurysm (HCC)       Past Medical History:   Diagnosis Date   • COPD (chronic obstructive pulmonary disease) (HCC)    • Elevated cholesterol    • GERD (gastroesophageal reflux disease)        Past Surgical History:   Procedure Laterality Date   • CARDIAC CATHETERIZATION     • COLON SURGERY     • COLONOSCOPY     • SKIN BIOPSY         SLP Recommendation and Plan                                                   SLP EVALUATION (last 72 hours)     SLP SLC Evaluation     Row Name 22 1430 22 0930 22 0922 09          Communication Assessment/Intervention    Document Type therapy note (daily note)  -SR therapy note (daily note)  -SR therapy note (daily note)  -ML evaluation  -SR     Subjective Information no complaints  -SR no complaints  -SR no complaints  -ML no complaints  -SR     Patient Observations alert;cooperative;agree to therapy  -SR alert;cooperative;agree to therapy  -SR alert;cooperative  -ML alert;cooperative;agree to therapy  -SR     Patient/Family/Caregiver Comments/Observations -- -- Pt seen in pt room sitting up in wheelchair.  -ML --     Patient Effort good  -SR good  -SR good  -ML good  -SR     Symptoms Noted During/After Treatment none  -SR none  -SR none  -ML fatigue  -SR            General Information    Patient Profile Reviewed -- -- -- yes  -SR     Pertinent History Of Current Problem -- -- -- 70 yo female; L MCA aneurysm, multi focal bilat basilar pneumonia, diverticulitis with microperforation and small abscess s/p ex lap with bowel resection and colostomy  -SR     Precautions/Limitations, Vision -- -- -- WFL with corrective  lenses;for purposes of eval  -SR     Precautions/Limitations, Hearing -- -- -- WFL;for purposes of eval  -SR     Plans/Goals Discussed with -- -- -- patient;agreed upon  -SR     Barriers to Rehab -- -- -- none identified  -SR     Patient's Goals for Discharge -- -- -- return to home  -SR     Standardized Assessment Used -- -- -- CLQT  -SR            Pain Scale: Numbers Pre/Post-Treatment    Pretreatment Pain Rating 8/10  -SR 5/10  -SR -- 5/10  -SR     Posttreatment Pain Rating 8/10  -SR 5/10  -SR -- 5/10  -SR     Pain Location incisional  -SR incisional  -SR -- incisional  -SR     Pain Location - abdomen  -SR abdomen  -SR -- abdomen  -SR            Standardized Tests    Cognitive/Memory Tests -- -- -- CLQT: Cognitive Linguistic Quick Test  -SR            CLQT (The Cognitive Linguistic Quick Test)    Attention Domain Score -- -- -- 123  -SR     Attention Severity Rating -- -- -- 3: Mild  -SR     Memory Domain Score -- -- -- 140  -SR     Memory Severity Rating -- -- -- 3: Mild  -SR     Executive Function Domain Score -- -- -- 7  -SR     Executive Function Severity Rating -- -- -- 1: Severe  -SR     Language Domain Score -- -- -- 27  -SR     Language Severity Rating -- -- -- 3: Mild  -SR     Visuospatial Domain Score -- -- -- 44  -SR     Visuospatial Severity Rating -- -- -- 3: Mild  -SR     Clock Drawing Total Score -- -- -- 5  -SR     Clock Drawing Severity Rating -- -- -- Severe  -SR     Composite Severity Rating -- -- -- 2.6  -SR     Composite Severity Rating Range -- -- -- 3.4 - 2.5: Mild  -SR     CLQT Comments -- -- -- The Cognitive Linguistic Quick Test (CLQT) was completed to further assess patient's strengths and weaknesses. Patient received a score of 2.6/4.0 indicating mild cognitive deficits. The results of the assessment were as followed: Attention - Mild, Memory - Mild, Executive Functions - Severe, Language -Mild, and Visuospatial skills -Mild.  Patient exhibited strengths in confrontational naming,  visual and auditory memory, and recalling personal facts. She had difficulty with word retrieval (i.e, generative naming), mental manipulation, and visual attention. During the clock drawing subtest, patient perseverated the number 12 once and arranged numbers on the right side of the clock. Difficulty with visuospatial skills was also observed during the mazes and design generation subtest. Patient showed decreased awareness toward cognitive deficits. Agreeable to speech therapy activities to target visual attention, organization, mental manipulation, and executive functioning skills (i.e, planning, deficit awareness).  -SR            SLP Evaluation Clinical Impressions    SLP Diagnosis -- -- -- mild-moderate cognitive impairment  -SR     Rehab Potential/Prognosis -- -- -- good  -SR     SLC Criteria for Skilled Therapy Interventions Met -- -- -- yes  -SR     Functional Impact -- -- -- difficulty completing home management task;difficulty completing vocational tasks  -SR            Recommendations    Therapy Frequency (SLP SLC) -- -- -- 5 days per week  -SR     Predicted Duration Therapy Intervention (Days) -- -- -- until discharge  -SR     Anticipated Discharge Disposition (SLP) -- -- -- home with assist;anticipate therapy at next level of care  -SR            Cognitive Linguistic Treatment Objectives    Attention Selection -- -- -- attention, SLP goal 1  -SR     Memory Skills Selection -- -- -- memory skills, SLP goal 1  -SR     Executive Function Skills Selection -- -- -- executive function skills, SLP goal 1  -SR            Attention Goal 1 (SLP)    Improve Attention by Goal 1 (SLP) -- -- -- attending to task;complete selective attention task;80%;independently (over 90% accuracy)  -SR            Memory Skills Goal 1 (SLP)    Improve Memory Skills Through Goal 1 (SLP) -- -- -- repeat list in sequential order;listen to a paragraph and answer questions;80%;independently (over 90% accuracy)  -SR     Time Frame  (Memory Skills Goal 1, SLP) -- -- -- by discharge  -SR            Functional Problem Solving Skills Goal 1 (SLP)    Improve Problem Solving Through Goal 1 (SLP) -- -- -- determine solutions to simple ADL/safety problems;determine solutions to multifactorial problems;sequence steps in a task;complete organization/home management task;70%;independently (over 90% accuracy)  -SR     Time Frame (Problem Solving Goal 1, SLP) -- -- -- by discharge  -SR            Executive Functional Skills Goal 1 (SLP)    Improve Executive Function Skills Goal 1 (SLP) -- -- -- demonstrate awareness of deficit;identify strategies, strengths, limitations;time management activity;complex organization/planning activity;home management activity;70%;independently (over 90% accuracy)  -SR     Time Frame (Executive Function Skills Goal 1, SLP) -- -- -- by discharge  -SR           User Key  (r) = Recorded By, (t) = Taken By, (c) = Cosigned By    Initials Name Effective Dates    ML Vaishali Rodgers MS CCC-SLP 06/16/21 -     SR Marlen Ochoa, RAGHU 01/12/22 -                    EDUCATION    The patient has been educated in the following areas:       Cognitive Impairment.             SLP GOALS     Row Name 09/14/22 1430 09/14/22 0930 09/13/22 1000       Attention Goal 1 (SLP)    Improve Attention by Goal 1 (SLP) -- attending to task;complete selective attention task;80%;independently (over 90% accuracy)  -SR attending to task;complete selective attention task;80%;independently (over 90% accuracy)  -ML    Time Frame (Attention Goal 1, SLP) -- by discharge  -SR by discharge  -ML    Progress (Attention Goal 1, SLP) -- -- with moderate cues (50-74%)  -ML    Progress/Outcomes (Attention Goal 1, SLP) -- goal ongoing  -SR goal ongoing  -ML    Comment (Attention Goal 1, SLP) -- Patient unscrambled 4-5 letter word using number/alphabet decoding key with 80% accuracy given no cues.  -SR --       Organizational Skills Goal 1 (SLP)    Improve Thought  Organization Through Goal 1 (SLP) -- -- completing mental manipulation task  -ML    Time Frame (Thought Organization Skills Goal 1, SLP) -- -- by discharge  -ML    Progress (Thought Organization Skills Goal 1, SLP) -- -- with 1:1 supervision/constant cues  -ML    Progress/Outcomes (Thought Organization Skills Goal 1, SLP) -- -- goal ongoing  -ML    Comment (Thought Organization Skills Goal 1, SLP) -- -- Task of seperating the combines words in each item of letters: 1/10 independently. Pt required cues ranging from min-total assist for other 9/10.  -ML       Functional Problem Solving Skills Goal 1 (SLP)    Improve Problem Solving Through Goal 1 (SLP) determine solutions to simple ADL/safety problems;determine solutions to multifactorial problems;sequence steps in a task;complete organization/home management task;70%;independently (over 90% accuracy)  -SR -- --    Time Frame (Problem Solving Goal 1, SLP) by discharge  -SR -- --    Progress/Outcomes (Problem Solving Goal 1, SLP) good progress toward goal  -SR -- --    Comment (Problem Solving Goal 1, SLP) Patient answered reasoning/logic-based questions about a shopping advertisement with 80% accuracy given min cues. During a following activity, patient created a daily schedule using details from a paragraph with 50% acc given mod cues ;100% acc given max cues.  -SR -- --       Functional Math Skills Goal 1 (SLP)    Improve Functional Math Skills Through Goal 1 (SLP) -- complete word problems involving time;complete functional math task;70%;independently (over 90% accuracy)  -SR complete word problems involving time;with minimal cues (75-90%)  -ML    Time Frame (Functional Math Skills Goal 1, SLP) -- by discharge  -SR by discharge  -ML    Progress (Functional Math Skills Goal 1, SLP) -- -- with maximum cues (25-49%)  -ML    Progress/Outcomes (Functional Math Skills Goal 1, SLP) -- goal ongoing  -SR goal ongoing  -ML    Comment (Functional Math Skills Goal 1, SLP) --  Money management; Patient added coin amounts with 60% accuracy given mod cues; 100% acc given min cues. Demonstrated difficulty with mental manipulation and sequencing.  -SR 3/7 independently, pt required mod assit for 4/7 and total assist for 7/7. 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.)  -ML       Executive Functional Skills Goal 1 (SLP)    Improve Executive Function Skills Goal 1 (SLP) demonstrate awareness of deficit;identify strategies, strengths, limitations;time management activity;complex organization/planning activity;home management activity;70%;independently (over 90% accuracy)  -SR -- demonstrate awareness of deficit;identify strategies, strengths, limitations;time management activity;complex organization/planning activity;home management activity;70%;independently (over 90% accuracy)  -ML    Time Frame (Executive Function Skills Goal 1, SLP) by discharge  -SR -- by discharge  -ML    Progress (Executive Function Skills Goal 1, SLP) -- -- with moderate cues (50-74%)  -ML    Progress/Outcomes (Executive Function Skills Goal 1, SLP) goal ongoing  -SR -- goal ongoing  -ML    Comment (Executive Function Skills Goal 1, SLP) Patient followed verbal directions to locate room using a hospital map with 80% accuracy given no cues  -SR -- Organizing functional information task: writing correct phone numbers related to given information- 4/7 independently, pt required mod-max cues for 100%.  -ML       Right Hemisphere Function Goal 1 (SLP)    Improve Right Hemisphere Function Through Goal 1 (SLP) -- -- complete visuo-spatial activities (visual closure, trail making, mazes;with minimal cues (75-90%)  -ML    Time Frame (Right Hemisphere Function Goal 1, SLP) -- -- by discharge  -ML    Progress (Right Hemisphere Function Goal 1, SLP) -- -- with 1:1 supervision/constant cues  -ML    Progress/Outcomes (Right Hemisphere  Function Goal 1, SLP) -- -- goal ongoing  -ML    Comment (Right Hemisphere Function Goal 1, SLP) -- -- Pt required max-total assist to draw continuous line alternating from number to letter in ascending order. Only 25% of task was completed.  -ML    Row Name 09/12/22 0900             Attention Goal 1 (SLP)    Improve Attention by Goal 1 (SLP) attending to task;complete selective attention task;80%;independently (over 90% accuracy)  -SR              Memory Skills Goal 1 (SLP)    Improve Memory Skills Through Goal 1 (SLP) repeat list in sequential order;listen to a paragraph and answer questions;80%;independently (over 90% accuracy)  -SR      Time Frame (Memory Skills Goal 1, SLP) by discharge  -SR              Functional Problem Solving Skills Goal 1 (SLP)    Improve Problem Solving Through Goal 1 (SLP) determine solutions to simple ADL/safety problems;determine solutions to multifactorial problems;sequence steps in a task;complete organization/home management task;70%;independently (over 90% accuracy)  -SR      Time Frame (Problem Solving Goal 1, SLP) by discharge  -SR              Executive Functional Skills Goal 1 (SLP)    Improve Executive Function Skills Goal 1 (SLP) demonstrate awareness of deficit;identify strategies, strengths, limitations;time management activity;complex organization/planning activity;home management activity;70%;independently (over 90% accuracy)  -SR      Time Frame (Executive Function Skills Goal 1, SLP) by discharge  -SR            User Key  (r) = Recorded By, (t) = Taken By, (c) = Cosigned By    Initials Name Provider Type     Vaishali Rodgers, MS CCC-SLP Speech and Language Pathologist     Marlen Ochoa, SLP Speech and Language Pathologist                            Time Calculation:        Time Calculation- SLP     Row Name 09/14/22 1537 09/14/22 1000          Time Calculation- SLP    SLP Start Time 1430  -SR 0930  -SR     SLP Stop Time 1500  -SR 1000  -SR     SLP Time  Calculation (min) 30 min  -SR 30 min  -SR           User Key  (r) = Recorded By, (t) = Taken By, (c) = Cosigned By    Initials Name Provider Type    SR Marlen Ochoa SLP Speech and Language Pathologist                  Therapy Charges for Today     Code Description Service Date Service Provider Modifiers Qty    77363719321 HC ST DEV OF COGN SKILLS INITIAL 15 MIN 9/14/2022 Marlen Ochoa SLP  1    39681201811 HC ST DEV OF COGN SKILLS EACH ADDT'L 15 MIN 9/14/2022 Marlen Ochoa SLP  3                           RAGHU Zavala  9/14/2022

## 2022-09-14 NOTE — THERAPY TREATMENT NOTE
Inpatient Rehabilitation - Occupational Therapy Treatment Note    [facility]     Patient Name: Summer Smith  : 1950  MRN: [id number]    Today's Date: 2022                 Admit Date: 2022         ICD-10-CM ICD-9-CM   1. Follow-up exam  Z09 V67.9       Patient Active Problem List   Diagnosis   • Aneurysm (HCC)       Past Medical History:   Diagnosis Date   • COPD (chronic obstructive pulmonary disease) (HCC)    • Elevated cholesterol    • GERD (gastroesophageal reflux disease)        Past Surgical History:   Procedure Laterality Date   • CARDIAC CATHETERIZATION     • COLON SURGERY     • COLONOSCOPY     • SKIN BIOPSY               IRF OT ASSESSMENT FLOWSHEET (last 12 hours)     IRF OT Evaluation and Treatment     Row Name 22 1555 22 1215       OT Time and Intention    Document Type progress note  -DN daily treatment  -DN    Mode of Treatment occupational therapy  -DN occupational therapy  -DN    Patient Effort good  -DN adequate  -DN    Symptoms Noted During/After Treatment -- dizziness  spoke with nsg that pt becomes dizzy with standing, BP unable to take due to machine failure, may need to consider jobst stockings, also B LE edema  -DN    Row Name 22 1555          Pain Assessment    Pretreatment Pain Rating 6/10  -DN     Posttreatment Pain Rating 6/10  -DN     Row Name 22 1555          Cognition/Psychosocial    Orientation Status (Cognition) oriented x 3  -DN     Follows Commands (Cognition) follows one-step commands  -DN     Personal Safety Interventions fall prevention program maintained;gait belt  -DN     Cognitive Function safety deficit  -DN     Row Name 22 1215          Bathing    [place] Level (Bathing) bathing skills;maximum assist (25% patient effort);verbal cues;nonverbal cues (demo/gesture)  -DN     Position (Bathing) supported sitting;supported standing  -DN     Set-up Assistance (Bathing) obtain supplies  -DN     Comment (Bathing) pt not able  to stand long enough to finish elo care due to dizziness  -DN     Row Name 09/14/22 1215          Lower Body Dressing    [place] Level (Lower Body Dressing) doff;don;pants/bottoms;shoes/slippers;socks;underwear  -DN     Position (Lower Body Dressing) supported sitting;supported standing  -DN     Set-up Assistance (Lower Body Dressing) obtain clothing  -DN     Row Name 09/14/22 1215          Grooming    [place] Level (Grooming) grooming skills;deodorant application;hair care, combing/brushing;oral care regimen;supervision  -DN     Position (Grooming) supported sitting  -DN     Set-up Assistance (Grooming) obtain supplies  -DN     Row Name 09/14/22 1555          Elbow/Forearm (Therapeutic Exercise)    Elbow/Forearm (Therapeutic Exercise) strengthening exercise  -DN     Elbow/Forearm AROM (Therapeutic Exercise) bilateral;10 repetitions;2 sets  -DN     Elbow/Forearm Strengthening (Therapeutic Exercise) 2 lb free weight  -DN     Row Name 09/14/22 1555          Wrist (Therapeutic Exercise)    Wrist (Therapeutic Exercise) strengthening exercise  -DN     Wrist AROM (Therapeutic Exercise) bilateral;10 repetitions;2 sets  -DN     Wrist Strengthening (Therapeutic Exercise) 2 lb free weight  -DN     Row Name 09/14/22 1555          Hand (Therapeutic Exercise)    Hand (Therapeutic Exercise) strengthening exercise  -DN     Hand AROM/AAROM (Therapeutic Exercise) bilateral  -DN     Hand Strengthening (Therapeutic Exercise) hand gripper  -DN     Row Name 09/14/22 1555          Balance    Static Standing Balance contact guard  CGA standing during RUE activity for 2 sets of 1 minute a peice on 2 liters of 02,  pt needed to sit due to fatigue  -DN     Row Name 09/14/22 1555 09/14/22 1215       Positioning and Restraints    Pre-Treatment Position sitting in chair/recliner  -DN sitting in chair/recliner  -DN    Post Treatment Position wheelchair  -DN wheelchair  -DN    In Bed with SLP  -DN sitting;call light within  reach;encouraged to call for assist;exit alarm on  -DN          User Key  (r) = Recorded By, (t) = Taken By, (c) = Cosigned By    Initials Name Effective Dates    Sina Currie OT 06/16/21 -                          OT Recommendation and Plan                         Time Calculation:      Time Calculation- OT     Row Name 09/14/22 1400 09/14/22 0830          Time Calculation- OT    OT Start Time 1400  -DN 0830  -DN     OT Stop Time 1430  -DN 0900  -DN     OT Time Calculation (min) 30 min  -DN 30 min  -DN           User Key  (r) = Recorded By, (t) = Taken By, (c) = Cosigned By    Initials Name Provider Type    Sina Currie OT Occupational Therapist              Therapy Charges for Today     Code Description Service Date Service Provider Modifiers Qty    35063083481 HC OT SELF CARE/MGMT/TRAIN EA 15 MIN 9/13/2022 Sina Walls OT GO 2    70368783463 HC OT SELF CARE/MGMT/TRAIN EA 15 MIN 9/13/2022 Sina Walls OT GO 1    33642984640 HC OT THER PROC EA 15 MIN 9/13/2022 Sina Walls OT GO 1    36988483972 HC OT SELF CARE/MGMT/TRAIN EA 15 MIN 9/14/2022 Sina Walls OT GO 2    39537803196 HC OT THER PROC EA 15 MIN 9/14/2022 Sina Walls OT GO 2                   Sina Walls OT  9/14/2022

## 2022-09-14 NOTE — THERAPY TREATMENT NOTE
Inpatient Rehabilitation - Physical Therapy Treatment Note       [facility]     Patient Name: Summer Smith  : 1950  MRN: [id number]    Today's Date: 2022                    Admit Date: 2022      Visit Dx:     ICD-10-CM ICD-9-CM   1. Follow-up exam  Z09 V67.9       Patient Active Problem List   Diagnosis   • Aneurysm (HCC)       Past Medical History:   Diagnosis Date   • COPD (chronic obstructive pulmonary disease) (HCC)    • Elevated cholesterol    • GERD (gastroesophageal reflux disease)        Past Surgical History:   Procedure Laterality Date   • CARDIAC CATHETERIZATION     • COLON SURGERY     • COLONOSCOPY     • SKIN BIOPSY         PT ASSESSMENT (last 12 hours)     IRF PT Evaluation and Treatment     Row Name 22 1136          PT Time and Intention    Document Type daily treatment  -DP     Mode of Treatment individual therapy;physical therapy  -DP     Patient/Family/Caregiver Comments/Observations Pt sitting up in w/c asleep upon PT arrival. OT informed that pt had orthostatic symptoms during their session today. PT attempted to take pt's BP during AM session but device did not read results and pt had no symptoms of lightheadedness or dizziness.  -DP     Row Name 22 1136          General Information    Patient Profile Reviewed yes  -DP     Existing Precautions/Restrictions fall;oxygen therapy device and L/min;other (see comments)  Pt was on 3L upon PT arrival but Pt titrated down to 2L and pt was still at 100%.  -DP     Row Name 22 1136          Pain Assessment    Pretreatment Pain Rating 0/10 - no pain  -DP     Posttreatment Pain Rating 0/10 - no pain  -DP     Pain Location incisional  -DP     Row Name 22 1136          Cognition/Psychosocial    Affect/Mental Status (Cognition) WFL;anxious  -DP     Orientation Status (Cognition) oriented x 3  -DP     Follows Commands (Cognition) follows one-step commands;75-90% accuracy;increased processing time needed;delayed  response/completion;initiation impaired  -DP     Personal Safety Interventions safety round/check completed;elopement precautions initiated;fall prevention program maintained;gait belt;muscle strengthening facilitated;nonskid shoes/slippers when out of bed;supervised activity  -DP     Row Name 09/14/22 1136          Bed Mobility    Comment, (Bed Mobility) NT pt up in w/c  -DP     Row Name 09/14/22 1136          Transfer Assessment/Treatment    Transfers stand pivot/stand step transfer;sit-stand transfer;stand-sit transfer  -DP     Comment, (Transfers) Mina to modA with transfers  -DP     Row Name 09/14/22 1136          Transfers    Sit-Stand [place] (Transfers) moderate assist (50% patient effort);nonverbal cues (demo/gesture);verbal cues  -DP     Stand-Sit [place] (Transfers) verbal cues;minimum assist (75% patient effort)  -DP     Row Name 09/14/22 1136          Sit-Stand Transfer    Assistive Device (Sit-Stand Transfers) parallel bars;wheelchair  -DP     Row Name 09/14/22 1136          Stand-Sit Transfer    Assistive Device (Stand-Sit Transfers) wheelchair  -DP     Row Name 09/14/22 1136          Stand Pivot/Stand Step Transfer    Stand Pivot/Stand Step [place] (Transfers) moderate assist (50% patient effort);verbal cues  -DP     Assistive Device (Stand Pivot Stand Step Transfer) wheelchair  -DP     Row Name 09/14/22 1136          Gait/Stairs (Locomotion)    [place] Level (Gait) minimum assist (75% patient effort);verbal cues;1 person to manage equipment;other (see comments)  Tech to follow with w/c  -DP     Assistive Device (Gait) parallel bars;walker, front-wheeled  -DP     Distance in Feet (Gait) 8'x2, 10'x1, 30'x1  -DP     Pattern (Gait) step-through;step-to  -DP     Deviations/Abnormal Patterns (Gait) base of support, narrow;festinating/shuffling;hunter decreased;gait speed decreased;stride length decreased  -DP     Bilateral Gait Deviations forward flexed posture;foot drop/toe drag  -DP      Gait Assessment/Intervention Pt is quick to fatigue especially when attempting to ambulate with FWW. Pt required VC to take longer steps and was able to perform after intial cue. In PM session the pt ambulated up to 30 feet using FWW on 2L of O2 staying at 96 SpO2.  -DP     Row Name 09/14/22 1136          Safety Issues, Functional Mobility    Impairments Affecting Function (Mobility) balance;cognition;endurance/activity tolerance;pain;postural/trunk control;strength  -DP     Row Name 09/14/22 1136          Motor Skills    Therapeutic Exercise hip;knee;ankle  -DP     Row Name 09/14/22 1136          Hip (Therapeutic Exercise)    Hip (Therapeutic Exercise) AROM (active range of motion)  -DP     Hip AROM (Therapeutic Exercise) bilateral;flexion;5 repetitions;other (see comments)  25% AROM performed  -DP     Hip Isometrics (Therapeutic Exercise) bilateral;gluteal sets;10 repetitions;3 second hold  -DP     Hip Strengthening (Therapeutic Exercise) bilateral;aBduction;resistance band;red;10 repetitions;2 sets  -DP     Row Name 09/14/22 1136          Knee (Therapeutic Exercise)    Knee (Therapeutic Exercise) AROM (active range of motion);strengthening exercise  -DP     Knee AROM (Therapeutic Exercise) bilateral;LAQ (long arc quad);10 repetitions;2 sets  -DP     Knee Strengthening (Therapeutic Exercise) bilateral;hamstring curls;sitting;red;resistance band;10 repetitions  -DP     Row Name 09/14/22 1136          Ankle (Therapeutic Exercise)    Ankle (Therapeutic Exercise) AROM (active range of motion)  -DP     Ankle AROM (Therapeutic Exercise) bilateral;dorsiflexion;plantarflexion;sitting;20 repititions;2 sets  -DP     Row Name 09/14/22 1136          Positioning and Restraints    Pre-Treatment Position sitting in chair/recliner  -DP     Post Treatment Position wheelchair  -DP     In Wheelchair with OT  -DP           User Key  (r) = Recorded By, (t) = Taken By, (c) = Cosigned By    Initials Name Provider Type    DP Jeana  Preston, PT Physical Therapist              Wound 09/09/22 2200 Right upper arm Laceration (Active)   Closure Sutures 09/13/22 2057   Base dressing in place, unable to visualize 09/13/22 2057   Drainage Amount none 09/14/22 0822   Dressing Care foam 09/14/22 0822       Wound 09/09/22 2200 Left midline abdomen Incision (Active)   Closure Approximated 09/13/22 2057   Drainage Amount none 09/14/22 0822   Dressing Care open to air 09/14/22 0822     Physical Therapy Education                 Title: PT OT SLP Therapies (Done)     Topic: Physical Therapy (Done)     Point: Mobility training (Done)     Learning Progress Summary           Patient Acceptance, E,D, VU,DU by DP at 9/14/2022 1143    Acceptance, E, VU,NR by MG at 9/13/2022 1143    Acceptance, E,TB, VU,NR by EE at 9/10/2022 1518                   Point: Home exercise program (Done)     Learning Progress Summary           Patient Acceptance, E,D, VU,DU by DP at 9/14/2022 1143    Acceptance, E, VU,NR by MG at 9/13/2022 1143    Acceptance, E,TB, VU,NR by EE at 9/10/2022 1518                   Point: Body mechanics (Done)     Learning Progress Summary           Patient Acceptance, E,D, VU,DU by DP at 9/14/2022 1143    Acceptance, E, VU,NR by MG at 9/13/2022 1143    Acceptance, E,TB, VU,NR by EE at 9/10/2022 1518                   Point: Precautions (Done)     Learning Progress Summary           Patient Acceptance, E,D, VU,DU by DP at 9/14/2022 1143    Acceptance, E, VU,NR by MG at 9/13/2022 1143    Acceptance, E, VU by MD at 9/12/2022 1114    Acceptance, E,TB, VU,NR by EE at 9/10/2022 1518                               User Key     Initials Effective Dates Name Provider Type Discipline    EE 06/16/21 -  Rand Patel, PT Physical Therapist PT    MD 06/16/21 -  Ale Garza, PT Physical Therapist PT    MG 05/24/22 -  Elisabeth Bowen, PT Physical Therapist PT    DP 08/24/21 -  Preston [place], PT Physical Therapist PT                PT Recommendation and Plan                           Time Calculation:      PT Charges     Row Name 09/14/22 1520 09/14/22 1143          Time Calculation    Start Time 1330  -DP 1100  -DP     Stop Time 1400  -DP 1130  -DP     Time Calculation (min) 30 min  -DP 30 min  -DP     PT Received On -- 09/14/22  -DP     PT - Next Appointment -- 09/15/22  -DP            Time Calculation- PT    Total Timed Code Minutes- PT 30 minute(s)  -DP 30 minute(s)  -DP           User Key  (r) = Recorded By, (t) = Taken By, (c) = Cosigned By    Initials Name Provider Type    DP [place], PT Physical Therapist                Therapy Charges for Today     Code Description Service Date Service Provider Modifiers Qty    50278154624 HC GAIT TRAINING EA 15 MIN 9/14/2022 [place], PT GP 1    67714343217 HC PT THER PROC EA 15 MIN 9/14/2022 [place], PT GP 1    22335993769 HC PT THER SUPP EA 15 MIN 9/14/2022 [place], PT GP 1    00333811247 HC GAIT TRAINING EA 15 MIN 9/14/2022 [place], PT GP 1    46904076853 HC PT THER PROC EA 15 MIN 9/14/2022 [place], PT GP 1              Patient was wearing a face mask during this therapy encounter. Therapist used appropriate personal protective equipment including mask and gloves.  Mask used was standard procedure mask. Appropriate PPE was worn during the entire therapy session. Hand hygiene was completed before and after therapy session. Patient is not in enhanced droplet precautions.         Preston Hills PT  9/14/2022

## 2022-09-14 NOTE — PROGRESS NOTES
LOS: 5 days   Patient Care Team:  Wale Jacob MD as PCP - General (Family Medicine)      JASBIR MAHMOOD  1950         ADMITTING DIAGNOSIS:  Immobility syndrome    Subjective     Patient continues with expected abdominal soreness.  She gets lightheaded when up.  Noted to be orthostatic.  Participating in therapies.  Physical therapy titrating down on supplemental oxygen.  Maintaining sats on 2 L    09/14/22 0822 -- 88 -- 90/51 Sitting nasal cannula 2 93   09/14/22 0616 98 (36.7) 69 18 121/58 Lying nasal cannula -- 95         Objective     Vitals:    09/14/22 1215   BP: 101/60   Pulse: 70   Resp: 18   Temp: 97.9 °F (36.6 °C)   SpO2: 96%       PHYSICAL EXAM:     Awake, alert and 0x3  NAD  Heart rr no m/g/r  Lungs cta bilat- is on 02 per nc  abd-  Colostomy  Normoactive bowel sounds.  Soft and nontender  Incision-dressed  Ext: right elbow laceration sutured-dressed  Moving all 4 extremities- generalized weakness  1+ edema bilateral lower extremities    MEDICATIONS  Scheduled Meds:amLODIPine, 5 mg, Oral, Q24H  atorvastatin, 40 mg, Oral, Nightly  cholecalciferol, 2,000 Units, Oral, Daily  enoxaparin, 40 mg, Subcutaneous, Nightly  lisinopril, 5 mg, Oral, Q24H  pantoprazole, 40 mg, Oral, Q AM  sertraline, 25 mg, Oral, Daily      Continuous Infusions:   PRN Meds:.•  acetaminophen  •  albuterol  •  HYDROcodone-acetaminophen **OR** HYDROcodone-acetaminophen      RESULTS  No results found for: POCGLU  Results from last 7 days   Lab Units 09/13/22  0651   WBC 10*3/mm3 8.05   HEMOGLOBIN g/dL 9.7*   HEMATOCRIT % 30.3*   PLATELETS 10*3/mm3 391           Invalid input(s): VIJAY SANTIAGO    Echocardiogram-September 1, 2022  Summary:                 The ejection fraction biplane was calculated at 65%.                            Intravenous contrast was used to enhance endocardial border definition.                            The left ventricular chamber size, wall thickness, and systolic function are within normal  limits. There are                            no regional wall motion abnormalities observed.                            Abnormal left ventricular diastolic filling consistent with impaired relaxation.                            The peak instantaneous gradient of the aortic valve is 28.7 mmHg. The mean gradient of the aortic valve                            is 16 mmHg.                            Mild aortic leaflet calcification.                            Mild aortic stenosis.                            Trace aortic regurgitation is noted.       ASSESSMENT and PLAN    Aneurysm (HCC)    Immobility Syndrome  1.  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 Will teach family colostomy care. Pain controlled. Encouraged to at least take pain meds prior to therapy    3.  HTN- stable on norvasc, lisinopril. Running low.  Decreased lisinopril to 5 mg.     4.  Depression - low dose zoloft    5.  DVT proph- was on lovenox-Will continue 40 mg daily for now    6. Pulm - nasal cannula O2. Wean, keep sats > 90%  September 13-Taper down to 2 L    7. Right lateral elbow laceration - August 31 - sutured    8.  Orthostatic hypotension-she has bilateral lower extremity edema but will hold on adding diuretic given her orthostatic hypotension.  We will try thigh-high Jobst stockings 15-20 mm compression for both orthostasis as well as edema.  Echocardiogram recently showed ejection fraction 65%.  Recheck CBC and chemistries in a.m.      TEAM CONF - SEPT 13 - TRANSFERS MOD . GAIT 8 FEET PARALLEL BARS MOD ASSIST. TOILET TRANSFERS MIN MOD. BATH MOD. LBD DEP. UBD MIN. EATING MIN. GROOMING MIN. TOILETING DEP. FATIGUES. USING PUREWICK.   OSTOMY EDUCATION. MEPILEX TO RIGHT ELBOW LACERATION. COGNITION - MILD TO MODERATE COGNITIVE IMPAIRMENT. VISUAL SPATIAL DEFICITS. OSTOMY EDUCATION. ABDOMINAL INCISION HEALING WITH JUST ONE SMALL AREA INFERIORLY WITH SLIGHT DRAINAGE.  WILL DISCONTINUE PUREWICK. TO DO TIMED VOIDS.  PULMONARY - WEAN O2 .   ELOS - THREE WEEKS    Now admit for comprehensive acute inpatient rehabilitation .  This would be an interdisciplinary program with physical therapy 1.5 hour,  occupational therapy 1.5 hour,  5 days a week.  Rehabilitation nursing for carryover, monitoring of  GI   status, bowel and bladder, and skin  Ongoing physician follow-up.  Weekly team conferences.  Goals are to achieve a level of supervision with  mobility and self-care and improved activity tolerance.   Rehabilitation prognosis air.  Medical prognosis fair.  Estimated length of stay is approximately 10 days , but is only an estimation.     The patient's functional status and clinical status is unchanged from preadmission assessment and the patient continues appropriate for acute inpatient rehabilitation.  Goal is for home with  Home health   therapies.  Barrier to discharge:  Impaired mobility and self care   - work on  Transfers, balance, gait, ADLS  to overcome.             Hossein Rivers MD      During rounds, used appropriate personal protective equipment including mask and gloves.  Additional gown if indicated.  Mask used was standard procedure mask. Appropriate PPE was worn during the entire visit.  Hand hygiene was completed before and after.

## 2022-09-14 NOTE — THERAPY TREATMENT NOTE
Inpatient Rehabilitation - Occupational Therapy Treatment Note    [facility]     Patient Name: Summer Smith  : 1950  MRN: [id number]    Today's Date: 2022                 Admit Date: 2022         ICD-10-CM ICD-9-CM   1. Follow-up exam  Z09 V67.9       Patient Active Problem List   Diagnosis   • Aneurysm (HCC)       Past Medical History:   Diagnosis Date   • COPD (chronic obstructive pulmonary disease) (HCC)    • Elevated cholesterol    • GERD (gastroesophageal reflux disease)        Past Surgical History:   Procedure Laterality Date   • CARDIAC CATHETERIZATION     • COLON SURGERY     • COLONOSCOPY     • SKIN BIOPSY               IRF OT ASSESSMENT FLOWSHEET (last 12 hours)     IRF OT Evaluation and Treatment     Row Name 22 1215          OT Time and Intention    Document Type daily treatment  -DN     Mode of Treatment occupational therapy  -DN     Patient Effort adequate  -DN     Symptoms Noted During/After Treatment dizziness  spoke with nsg that pt becomes dizzy with standing, BP unable to take due to machine failure, may need to consider jobst stockings, also B LE edema  -DN     Row Name 22          Bathing    [place] Level (Bathing) bathing skills;maximum assist (25% patient effort);verbal cues;nonverbal cues (demo/gesture)  -DN     Position (Bathing) supported sitting;supported standing  -DN     Set-up Assistance (Bathing) obtain supplies  -DN     Comment (Bathing) pt not able to stand long enough to finish elo care due to dizziness  -DN     Row Name 22 1215          Lower Body Dressing    [place] Level (Lower Body Dressing) doff;don;pants/bottoms;shoes/slippers;socks;underwear  -DN     Position (Lower Body Dressing) supported sitting;supported standing  -DN     Set-up Assistance (Lower Body Dressing) obtain clothing  -DN     Row Name 22 1215          Grooming    [place] Level (Grooming) grooming skills;deodorant application;hair care,  combing/brushing;oral care regimen;supervision  -DN     Position (Grooming) supported sitting  -DN     Set-up Assistance (Grooming) obtain supplies  -DN     Row Name 09/14/22 1215          Positioning and Restraints    Pre-Treatment Position sitting in chair/recliner  -DN     Post Treatment Position wheelchair  -DN     In Bed sitting;call light within reach;encouraged to call for assist;exit alarm on  -DN           User Key  (r) = Recorded By, (t) = Taken By, (c) = Cosigned By    Initials Name Effective Dates    Sina Currie OT 06/16/21 -                          OT Recommendation and Plan                         Time Calculation:      Time Calculation- OT     Row Name 09/14/22 0830             Time Calculation- OT    OT Start Time 0830  -DN      OT Stop Time 0900  -DN      OT Time Calculation (min) 30 min  -DN            User Key  (r) = Recorded By, (t) = Taken By, (c) = Cosigned By    Initials Name Provider Type    Sina Currie OT Occupational Therapist              Therapy Charges for Today     Code Description Service Date Service Provider Modifiers Qty    20106968916 HC OT SELF CARE/MGMT/TRAIN EA 15 MIN 9/13/2022 Sina Walls OT GO 2    18956749488 HC OT SELF CARE/MGMT/TRAIN EA 15 MIN 9/13/2022 Sina Walls OT GO 1    83851505476 HC OT THER PROC EA 15 MIN 9/13/2022 Sina Walls OT GO 1    65381272372 HC OT SELF CARE/MGMT/TRAIN EA 15 MIN 9/14/2022 Sina Walls OT GO 2                   Sina Walls OT  9/14/2022

## 2022-09-14 NOTE — PLAN OF CARE
Goal Outcome Evaluation:  Plan of Care Reviewed With: patient        Progress: improving  Outcome Evaluation: Summer has had a good day, except for a drop in BP while in the bathroom with therapy. She is assist x2, takes medication with water, and pain medication given once. She was up to the [facility], colostomy draining well, and no unsafe behavior. She uses O2 at 2 to 3L of oxygen at all times, dressing to arm changed today, and family at bedside throughout the day. Jobst stocking- thigh high ordered today,  wraps to legs at night. and no other issues at this time.

## 2022-09-15 LAB
ANION GAP SERPL CALCULATED.3IONS-SCNC: 6 MMOL/L (ref 5–15)
BASOPHILS # BLD AUTO: 0.04 10*3/MM3 (ref 0–0.2)
BASOPHILS NFR BLD AUTO: 0.7 % (ref 0–1.5)
BUN SERPL-MCNC: 9 MG/DL (ref 8–23)
BUN/CREAT SERPL: 20.5 (ref 7–25)
CALCIUM SPEC-SCNC: 8.8 MG/DL (ref 8.6–10.5)
CHLORIDE SERPL-SCNC: 103 MMOL/L (ref 98–107)
CO2 SERPL-SCNC: 33 MMOL/L (ref 22–29)
CREAT SERPL-MCNC: 0.44 MG/DL (ref 0.57–1)
DEPRECATED RDW RBC AUTO: 45.2 FL (ref 37–54)
EGFRCR SERPLBLD CKD-EPI 2021: 104.2 ML/MIN/1.73
EOSINOPHIL # BLD AUTO: 0.21 10*3/MM3 (ref 0–0.4)
EOSINOPHIL NFR BLD AUTO: 3.6 % (ref 0.3–6.2)
ERYTHROCYTE [DISTWIDTH] IN BLOOD BY AUTOMATED COUNT: 13.1 % (ref 12.3–15.4)
GLUCOSE SERPL-MCNC: 91 MG/DL (ref 65–99)
HCT VFR BLD AUTO: 28.6 % (ref 34–46.6)
HGB BLD-MCNC: 9 G/DL (ref 12–15.9)
IMM GRANULOCYTES # BLD AUTO: 0.02 10*3/MM3 (ref 0–0.05)
IMM GRANULOCYTES NFR BLD AUTO: 0.3 % (ref 0–0.5)
LYMPHOCYTES # BLD AUTO: 1.47 10*3/MM3 (ref 0.7–3.1)
LYMPHOCYTES NFR BLD AUTO: 24.9 % (ref 19.6–45.3)
MCH RBC QN AUTO: 29.6 PG (ref 26.6–33)
MCHC RBC AUTO-ENTMCNC: 31.5 G/DL (ref 31.5–35.7)
MCV RBC AUTO: 94.1 FL (ref 79–97)
MONOCYTES # BLD AUTO: 0.66 10*3/MM3 (ref 0.1–0.9)
MONOCYTES NFR BLD AUTO: 11.2 % (ref 5–12)
NEUTROPHILS NFR BLD AUTO: 3.51 10*3/MM3 (ref 1.7–7)
NEUTROPHILS NFR BLD AUTO: 59.3 % (ref 42.7–76)
NRBC BLD AUTO-RTO: 0 /100 WBC (ref 0–0.2)
PLATELET # BLD AUTO: 326 10*3/MM3 (ref 140–450)
PMV BLD AUTO: 11.1 FL (ref 6–12)
POTASSIUM SERPL-SCNC: 3.8 MMOL/L (ref 3.5–5.2)
RBC # BLD AUTO: 3.04 10*6/MM3 (ref 3.77–5.28)
SODIUM SERPL-SCNC: 142 MMOL/L (ref 136–145)
WBC NRBC COR # BLD: 5.91 10*3/MM3 (ref 3.4–10.8)

## 2022-09-15 PROCEDURE — 25010000002 ENOXAPARIN PER 10 MG: Performed by: PHYSICAL MEDICINE & REHABILITATION

## 2022-09-15 PROCEDURE — 97129 THER IVNTJ 1ST 15 MIN: CPT

## 2022-09-15 PROCEDURE — 97530 THERAPEUTIC ACTIVITIES: CPT

## 2022-09-15 PROCEDURE — 85025 COMPLETE CBC W/AUTO DIFF WBC: CPT | Performed by: PHYSICAL MEDICINE & REHABILITATION

## 2022-09-15 PROCEDURE — 97130 THER IVNTJ EA ADDL 15 MIN: CPT

## 2022-09-15 PROCEDURE — 80048 BASIC METABOLIC PNL TOTAL CA: CPT | Performed by: PHYSICAL MEDICINE & REHABILITATION

## 2022-09-15 PROCEDURE — 97535 SELF CARE MNGMENT TRAINING: CPT

## 2022-09-15 PROCEDURE — 97110 THERAPEUTIC EXERCISES: CPT

## 2022-09-15 PROCEDURE — 97116 GAIT TRAINING THERAPY: CPT

## 2022-09-15 RX ADMIN — HYDROCODONE BITARTRATE AND ACETAMINOPHEN 2 TABLET: 5; 325 TABLET ORAL at 07:13

## 2022-09-15 RX ADMIN — SERTRALINE 25 MG: 25 TABLET, FILM COATED ORAL at 07:14

## 2022-09-15 RX ADMIN — PANTOPRAZOLE SODIUM 40 MG: 40 TABLET, DELAYED RELEASE ORAL at 05:37

## 2022-09-15 RX ADMIN — HYDROCODONE BITARTRATE AND ACETAMINOPHEN 2 TABLET: 5; 325 TABLET ORAL at 17:10

## 2022-09-15 RX ADMIN — Medication 2000 UNITS: at 07:14

## 2022-09-15 RX ADMIN — ENOXAPARIN SODIUM 40 MG: 100 INJECTION SUBCUTANEOUS at 20:37

## 2022-09-15 RX ADMIN — ATORVASTATIN CALCIUM 40 MG: 20 TABLET, FILM COATED ORAL at 20:37

## 2022-09-15 NOTE — PLAN OF CARE
Goal Outcome Evaluation:  Plan of Care Reviewed With: patient        Progress: improving  Outcome Evaluation: Summer has had a good day, tolerated all therapies. She is assist x2, takes medication with water, and pain medication given once. She was up to the BSC, colostomy draining well, and no unsafe behavior. She uses O2 at 2 to 3L of oxygen at all times, dressing to arm changed today, and family at bedside throughout the day. Jobst stocking- thigh high ordered today,  wraps to legs at night. and no other issues at this time.

## 2022-09-15 NOTE — THERAPY TREATMENT NOTE
Inpatient Rehabilitation - Speech Language Pathology Treatment Note    [facility]     Patient Name: Summer Smith  : 1950  MRN: [id number]    Today's Date: 9/15/2022                   Admit Date: 2022       Visit Dx:      ICD-10-CM ICD-9-CM   1. Follow-up exam  Z09 V67.9       Patient Active Problem List   Diagnosis   • Aneurysm (HCC)       Past Medical History:   Diagnosis Date   • COPD (chronic obstructive pulmonary disease) (HCC)    • Elevated cholesterol    • GERD (gastroesophageal reflux disease)        Past Surgical History:   Procedure Laterality Date   • CARDIAC CATHETERIZATION     • COLON SURGERY     • COLONOSCOPY     • SKIN BIOPSY         SLP Recommendation and Plan                                                   SLP EVALUATION (last 72 hours)     SLP SLC Evaluation     Row Name 09/15/22 1300 09/15/22 0900 22 1430 22 0930 22 0900       Communication Assessment/Intervention    Document Type therapy note (daily note)  -SR therapy note (daily note)  -SR therapy note (daily note)  -SR therapy note (daily note)  -SR therapy note (daily note)  -ML    Subjective Information no complaints  -SR no complaints  -SR no complaints  -SR no complaints  -SR no complaints  -ML    Patient Observations alert;cooperative;agree to therapy  -SR alert;cooperative;agree to therapy  -SR alert;cooperative;agree to therapy  -SR alert;cooperative;agree to therapy  -SR alert;cooperative  -ML    Patient/Family/Caregiver Comments/Observations Pt up in recliner for PM therapy session.  -SR -- -- -- Pt seen in pt room sitting up in wheelchair.  -ML    Patient Effort good  -SR good  -SR good  -SR good  -SR good  -ML    Symptoms Noted During/After Treatment none  -SR none  -SR none  -SR none  -SR none  -ML       Pain Scale: Numbers Pre/Post-Treatment    Pretreatment Pain Rating 0/10 - no pain  -SR 0/10 - no pain  -SR 8/10  -SR 5/10  -SR --    Posttreatment Pain Rating 0/10 - no pain  -SR 0/10 - no  pain  -SR 8/10  -SR 5/10  -SR --    Pain Location -- -- incisional  -SR incisional  -SR --    Pain Location - -- -- abdomen  -SR abdomen  -SR --          User Key  (r) = Recorded By, (t) = Taken By, (c) = Cosigned By    Initials Name Effective Dates    ML Vaishali Rodgers, MS CCC-SLP 06/16/21 -     SR Ofelia Ochoaah, SLP 01/12/22 -                    EDUCATION    The patient has been educated in the following areas:       Cognitive Impairment.             SLP GOALS     Row Name 09/15/22 1300 09/15/22 0900 09/14/22 1430       Attention Goal 1 (SLP)    Improve Attention by Goal 1 (SLP) attending to task;complete selective attention task;80%;independently (over 90% accuracy)  -SR attending to task;complete selective attention task;80%;independently (over 90% accuracy)  -SR --    Time Frame (Attention Goal 1, SLP) by discharge  -SR by discharge  -SR --    Progress (Attention Goal 1, SLP) 80%;with maximum cues (25-49%)  -SR -- --    Progress/Outcomes (Attention Goal 1, SLP) good progress toward goal  -SR good progress toward goal  -SR --    Comment (Attention Goal 1, SLP) Visual scanning/alternating attention. Patient required max assist to draw continuous line alternating from number to letter.  -SR Patient followed 2-step simple written directions with 100% accuracy given no cues.  -SR --       Functional Problem Solving Skills Goal 1 (SLP)    Improve Problem Solving Through Goal 1 (SLP) -- -- determine solutions to simple ADL/safety problems;determine solutions to multifactorial problems;sequence steps in a task;complete organization/home management task;70%;independently (over 90% accuracy)  -SR    Time Frame (Problem Solving Goal 1, SLP) -- -- by discharge  -SR    Progress/Outcomes (Problem Solving Goal 1, SLP) -- -- good progress toward goal  -SR    Comment (Problem Solving Goal 1, SLP) -- -- Patient answered reasoning/logic-based questions about a shopping advertisement with 80% accuracy given min cues. During  a following activity, patient created a daily schedule using details from a paragraph with 50% acc given mod cues ;100% acc given max cues.  -SR       Functional Math Skills Goal 1 (SLP)    Improve Functional Math Skills Through Goal 1 (SLP) -- complete word problems involving time;complete functional math task;70%;independently (over 90% accuracy)  -SR --    Time Frame (Functional Math Skills Goal 1, SLP) -- by discharge  -SR --    Progress/Outcomes (Functional Math Skills Goal 1, SLP) -- goal ongoing  -SR --    Comment (Functional Math Skills Goal 1, SLP) -- Functional math and writing; Patient wrote checks for 3 separate billing companies with 80% accuracy given no cues. Required verbal cues for today's date. During a following activity, patient calculated amount remaining in checkbook using the billing statements with 80% acc given min cues. Calculator assist.  -SR --       Executive Functional Skills Goal 1 (SLP)    Improve Executive Function Skills Goal 1 (SLP) demonstrate awareness of deficit;identify strategies, strengths, limitations;time management activity;complex organization/planning activity;home management activity;70%;independently (over 90% accuracy)  -SR -- demonstrate awareness of deficit;identify strategies, strengths, limitations;time management activity;complex organization/planning activity;home management activity;70%;independently (over 90% accuracy)  -SR    Time Frame (Executive Function Skills Goal 1, SLP) by discharge  -SR -- by discharge  -SR    Progress/Outcomes (Executive Function Skills Goal 1, SLP) goal ongoing  -SR -- goal ongoing  -SR    Comment (Executive Function Skills Goal 1, SLP) Using the Constant [employer] chandni, patient provided the time on a clock given choice of 3 with 70% accuracy given no cues.  -SR -- Patient followed verbal directions to locate room using a hospital map with 80% accuracy given no cues  -SR       Right Hemisphere Function Goal 1 (SLP)    Improve Right  Hemisphere Function Through Goal 1 (SLP) complete visuo-spatial activities (visual closure, trail making, mazes;with minimal cues (75-90%)  -SR -- --    Time Frame (Right Hemisphere Function Goal 1, SLP) by discharge  -SR -- --    Progress/Outcomes (Right Hemisphere Function Goal 1, SLP) goal ongoing  -SR -- --    Comment (Right Hemisphere Function Goal 1, SLP) Patient completed clock drawing activity with 25% accuracy given max cues. During the first activity, patient wrote 13 numbers in the clock (perseverated number 12). Numbers 1-6  were evenly arranged on the right side, but numbers 6-12 were clustered on the lower left quadrant. For the second clock, patient wrote numbers 56-70 around the clock. Demonstrated decreased awareness of errors during activity.  -SR -- --    Row Name 09/14/22 0930 09/13/22 1000          Attention Goal 1 (SLP)    Improve Attention by Goal 1 (SLP) attending to task;complete selective attention task;80%;independently (over 90% accuracy)  -SR attending to task;complete selective attention task;80%;independently (over 90% accuracy)  -ML     Time Frame (Attention Goal 1, SLP) by discharge  -SR by discharge  -ML     Progress (Attention Goal 1, SLP) -- with moderate cues (50-74%)  -ML     Progress/Outcomes (Attention Goal 1, SLP) goal ongoing  -SR goal ongoing  -ML     Comment (Attention Goal 1, SLP) Patient unscrambled 4-5 letter word using number/alphabet decoding key with 80% accuracy given no cues.  -SR --            Organizational Skills Goal 1 (SLP)    Improve Thought Organization Through Goal 1 (SLP) -- completing mental manipulation task  -ML     Time Frame (Thought Organization Skills Goal 1, SLP) -- by discharge  -ML     Progress (Thought Organization Skills Goal 1, SLP) -- with 1:1 supervision/constant cues  -ML     Progress/Outcomes (Thought Organization Skills Goal 1, SLP) -- goal ongoing  -ML     Comment (Thought Organization Skills Goal 1, SLP) -- Task of seperating the  combines words in each item of letters: 1/10 independently. Pt required cues ranging from min-total assist for other 9/10.  -ML            Functional Math Skills Goal 1 (SLP)    Improve Functional Math Skills Through Goal 1 (SLP) complete word problems involving time;complete functional math task;70%;independently (over 90% accuracy)  -SR complete word problems involving time;with minimal cues (75-90%)  -ML     Time Frame (Functional Math Skills Goal 1, SLP) by discharge  -SR by discharge  -ML     Progress (Functional Math Skills Goal 1, SLP) -- with maximum cues (25-49%)  -ML     Progress/Outcomes (Functional Math Skills Goal 1, SLP) goal ongoing  -SR goal ongoing  -ML     Comment (Functional Math Skills Goal 1, SLP) Money management; Patient added coin amounts with 60% accuracy given mod cues; 100% acc given min cues. Demonstrated difficulty with mental manipulation and sequencing.  -SR 3/7 independently, pt required mod assit for 4/7 and total assist for 7/7. 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.)  -            Executive Functional Skills Goal 1 (SLP)    Improve Executive Function Skills Goal 1 (SLP) -- demonstrate awareness of deficit;identify strategies, strengths, limitations;time management activity;complex organization/planning activity;home management activity;70%;independently (over 90% accuracy)  -ML     Time Frame (Executive Function Skills Goal 1, SLP) -- by discharge  -ML     Progress (Executive Function Skills Goal 1, SLP) -- with moderate cues (50-74%)  -ML     Progress/Outcomes (Executive Function Skills Goal 1, SLP) -- goal ongoing  -ML     Comment (Executive Function Skills Goal 1, SLP) -- Organizing functional information task: writing correct phone numbers related to given information- 4/7 independently, pt required mod-max cues for 100%.  -ML            Right Hemisphere Function Goal 1 (SLP)     Improve Right Hemisphere Function Through Goal 1 (SLP) -- complete visuo-spatial activities (visual closure, trail making, mazes;with minimal cues (75-90%)  -ML     Time Frame (Right Hemisphere Function Goal 1, SLP) -- by discharge  -ML     Progress (Right Hemisphere Function Goal 1, SLP) -- with 1:1 supervision/constant cues  -ML     Progress/Outcomes (Right Hemisphere Function Goal 1, SLP) -- goal ongoing  -ML     Comment (Right Hemisphere Function Goal 1, SLP) -- Pt required max-total assist to draw continuous line alternating from number to letter in ascending order. Only 25% of task was completed.  -ML           User Key  (r) = Recorded By, (t) = Taken By, (c) = Cosigned By    Initials Name Provider Type    Vaishali Davis MS CCC-SLP Speech and Language Pathologist    Marlen Becerra SLP Speech and Language Pathologist                            Time Calculation:        Time Calculation- SLP     Row Name 09/15/22 1456 09/15/22 1148          Time Calculation- SLP    SLP Start Time 1300  -SR 0930  -SR     SLP Stop Time 1330  -SR 1000  -SR     SLP Time Calculation (min) 30 min  -SR 30 min  -SR           User Key  (r) = Recorded By, (t) = Taken By, (c) = Cosigned By    Initials Name Provider Type    Marlen Becerra SLP Speech and Language Pathologist                  Therapy Charges for Today     Code Description Service Date Service Provider Modifiers Qty    92926399865 HC ST DEV OF COGN SKILLS INITIAL 15 MIN 9/14/2022 Marlen Ochoa SLP  1    66166132319 HC ST DEV OF COGN SKILLS EACH ADDT'L 15 MIN 9/14/2022 Marlen Ochoa SLP  3    68276856630 HC ST DEV OF COGN SKILLS INITIAL 15 MIN 9/15/2022 Marlen Ochoa SLP  1    53454724282 HC ST DEV OF COGN SKILLS EACH ADDT'L 15 MIN 9/15/2022 Marlen Ochoa SLP  3                           RAGHU Zavala  9/15/2022

## 2022-09-15 NOTE — PROGRESS NOTES
PPS CMG Coordinator  Inpatient Rehabilitation Admission    Ethnic Group: White.  Marital Status:  Marital Status: .    IRF Admission Date:  09/09/2022  Admission Class: Initial Rehab.  Admit From:  [facility]    Pre-Hospital Living: Home. Pre-Hospital Living  With: (2) Family/Relatives.    Payment Sources: Primary: Medicare Fee for Service  Secondary: Not Listed.  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 No    Presence of Pressure Ulcer:  No observed/documented pressure ulcers.    MEDICAL NEEDS  Height on Admission:  70 inches.  Weight on Admission:  253 pounds.    QUALITY INDICATORS  Prior Functioning:  Self Care: Patient completed the activities by him/herself, with or without an  assistive device, with no assistance from a helper.  Indoor Mobility: Patient completed the activities by him/herself, with or  without an assistive device, with no assistance from a helper.  Stairs: Patient completed the activities by him/herself, with or without an  assistive device, with no assistance from a helper.  Functional Cognition: Patient completed the activities by him/herself, with or  without an assistive device, with no assistance from a helper.  Prior Device Use: Patient does not use manual or motorized wheelchair or  scooter, mechanical lift, walker, or an orthotic/prosthesis.    Bladder and Bowel: Bladder Continence: Always continent (no documented  incontinence).  Bowel Continence: Not rated (patient had an ostomy or did not have a bowel  movement for the entire 3  days).  Swallowing/Nutritional Status: Modiified food consistency/supervision (patient  requires modified food or liquid consistency and/or needs supervision during  eating for safety).  Special Conditions: Patient did not receive total parenteral nutrition treatment  at the time of admission.    Section I. Active Diagnosis: Comorbidities and Co-existing Conditions:   Patient  does not have PAD, PVD, or Diabetes Mellitus  Section J. Health Conditions: Patient has not had any falls in the past year.  Patient has had major surgery during the 100 days prior to admission.  Section M. Skin Conditions  Unhealed Pressure Ulcer/Injuries at Stage 1 or  Higher on Admission:  No.  Section N. Medication:  Potential Clinically Significant Medication Issues: No issues found during  review    Signed by: Elisha Chaparro RN

## 2022-09-15 NOTE — THERAPY TREATMENT NOTE
Inpatient Rehabilitation - Occupational Therapy Treatment Note    [facility]     Patient Name: Summer Smith  : 1950  MRN: [id number]    Today's Date: 9/15/2022                 Admit Date: 2022         ICD-10-CM ICD-9-CM   1. Follow-up exam  Z09 V67.9       Patient Active Problem List   Diagnosis   • Aneurysm (HCC)       Past Medical History:   Diagnosis Date   • COPD (chronic obstructive pulmonary disease) (HCC)    • Elevated cholesterol    • GERD (gastroesophageal reflux disease)        Past Surgical History:   Procedure Laterality Date   • CARDIAC CATHETERIZATION     • COLON SURGERY     • COLONOSCOPY     • SKIN BIOPSY               IRF OT ASSESSMENT FLOWSHEET (last 12 hours)     IRF OT Evaluation and Treatment     Row Name 09/15/22 1159          OT Time and Intention    Document Type progress note  -DN     Mode of Treatment occupational therapy  -DN     Patient Effort good  -DN     Row Name 09/15/22 1159          Pain Assessment    Pretreatment Pain Rating 2/10  -DN     Posttreatment Pain Rating 2/10  -DN     Pain Location incisional  -DN     Pain Location - abdomen  -DN     Row Name 09/15/22 1152          Cognition/Psychosocial    Affect/Mental Status (Cognition) WFL;anxious  -DN     Follows Commands (Cognition) follows one-step commands;over 90% accuracy;increased processing time needed;initiation impaired;physical/tactile prompts required  -DN     Row Name 09/15/22 1155          Bathing    Comment (Bathing) pt pre dressed by nsg aid prior to OT session  -DN     Row Name 09/15/22 1159          Lower Body Dressing    [place] Level (Lower Body Dressing) dependent (less than 25% patient effort)  -DN     Position (Lower Body Dressing) supported sitting  -DN     Set-up Assistance (Lower Body Dressing) anti-embolic stockings  -DN     Comment (Lower Body Dressing) pt now has thigh high jobst stockings  -DN     Row Name 09/15/22 1153          Grooming    Call[place] Level (Grooming) grooming  skills;deodorant application;hair care, combing/brushing;oral care regimen;supervision  -DN     Assistive Device (Grooming) long-handled comb/brush  -DN     Position (Grooming) supported sitting;supported standing  -DN     Set-up Assistance (Grooming) obtain supplies  -DN     Row Name 09/15/22 1159          Shoulder (Therapeutic Exercise)    Shoulder (Therapeutic Exercise) wand exercises  -DN     Shoulder AROM (Therapeutic Exercise) bilateral;10 repetitions;3 sets  -DN     Shoulder Strengthening (Therapeutic Exercise) 1 lb free weight  -DN     Row Name 09/15/22 1159          Positioning and Restraints    Pre-Treatment Position sitting in chair/recliner  -DN     Post Treatment Position wheelchair  -DN     In Bed sitting;call light within reach;encouraged to call for assist;exit alarm on  -DN           User Key  (r) = Recorded By, (t) = Taken By, (c) = Cosigned By    Initials Name Effective Dates    Sina Currie OT 06/16/21 -                          OT Recommendation and Plan                         Time Calculation:      Time Calculation- OT     Row Name 09/15/22 0830             Time Calculation- OT    OT Start Time 0830  -DN      OT Stop Time 0900  -DN      OT Time Calculation (min) 30 min  -DN            User Key  (r) = Recorded By, (t) = Taken By, (c) = Cosigned By    Initials Name Provider Type    Sina Currie OT Occupational Therapist              Therapy Charges for Today     Code Description Service Date Service Provider Modifiers Qty    74760541537 HC OT SELF CARE/MGMT/TRAIN EA 15 MIN 9/14/2022 Sina Walls OT GO 2    34772309106 HC OT THER PROC EA 15 MIN 9/14/2022 Sina Walls OT GO 2    09677311424 HC OT SELF CARE/MGMT/TRAIN EA 15 MIN 9/15/2022 Sina Walls OT GO 1    81500687328 HC OT THER PROC EA 15 MIN 9/15/2022 Sina Walls OT GO 1                   Sina Walls OT  9/15/2022

## 2022-09-15 NOTE — PROGRESS NOTES
"Nutrition Services    Patient Name:  Summer Smith  YOB: 1950  MRN: [id number]  Admit Date:  9/9/2022      PROGRESS NOTE - REHAB    Comments: Follow up    Encounter Information        Trending Narrative Pt seen at lunch time. Intake/appetite improving, 50-75%. Doesn't drinks the boost regularly but doesn't want it dc'd.      Current Nutrition Orders & Evaluation of Intake       Oral Nutrition     Food Allergies NKFA   Current PO Diet Diet Soft Texture; Ground   Supplement Boost plus TID   PO Evaluation    Trending % PO Intake 50-75% x 3 days   Factors Affecting Intake  fatigue, weakness   --  Anthropometrics         Height   Weight Height: 177.8 cm (70\")  Weight: 115 kg (252 lb 10.4 oz) (09/09/22 2158)   BMI kg/m2 Body mass index is 36.25 kg/m².   Weight trend No weight hx     Physical Findings          Physical Appearance alert, hard of hearing, oriented, overweight, room air, 2+ edema   Gastrointestinal Last BM: 9/12, colostomy LLQ   Skin Left midline abdomen incision   --  Labs       Pertinent Labs Reviewed, listed below     Results from last 7 days   Lab Units 09/15/22  0552   SODIUM mmol/L 142   POTASSIUM mmol/L 3.8   CHLORIDE mmol/L 103   CO2 mmol/L 33.0*   BUN mg/dL 9   CREATININE mg/dL 0.44*   CALCIUM mg/dL 8.8   GLUCOSE mg/dL 91     Results from last 7 days   Lab Units 09/15/22  0552   HEMOGLOBIN g/dL 9.0*   HEMATOCRIT % 28.6*   WBC 10*3/mm3 5.91     Results from last 7 days   Lab Units 09/15/22  0552 09/13/22  0651   PLATELETS 10*3/mm3 326 391     No results found for: COVID19  No results found for: HGBA1C       Medications           Scheduled Medications amLODIPine, 5 mg, Oral, Q24H  atorvastatin, 40 mg, Oral, Nightly  cholecalciferol, 2,000 Units, Oral, Daily  enoxaparin, 40 mg, Subcutaneous, Nightly  lisinopril, 5 mg, Oral, Q24H  pantoprazole, 40 mg, Oral, Q AM  sertraline, 25 mg, Oral, Daily       Infusions     PRN Medications •  acetaminophen  •  albuterol  •  " HYDROcodone-acetaminophen **OR** HYDROcodone-acetaminophen     Intervention Goal        Intervention Goal(s) Maintain nutrition status, Reduce/improve symptoms, Disease management/therapy, Tolerate PO , Continue positive trend and PO intake goal %: 75     Nutrition Intervention        RD Action Menu provided, encourage intake, plan of care reviewed  Other: visit pt at lunch time     Recommendations        Diet Prescription    Supplement Prescription    Prescription Ordered    --  Monitor/Evaluation        Monitor Per protocol, PO intake, Supplement intake, Pertinent labs, Weight, Skin status, GI status, Symptoms, POC/GOC         Electronically signed by:  Mara Chappell RD  09/15/22 13:16 EDT

## 2022-09-15 NOTE — THERAPY TREATMENT NOTE
Inpatient Rehabilitation - Physical Therapy Treatment Note       [facility]     Patient Name: Summer Smith  : 1950  MRN: [id number]    Today's Date: 9/15/2022                    Admit Date: 2022      Visit Dx:     ICD-10-CM ICD-9-CM   1. Follow-up exam  Z09 V67.9       Patient Active Problem List   Diagnosis   • Aneurysm (HCC)       Past Medical History:   Diagnosis Date   • COPD (chronic obstructive pulmonary disease) (HCC)    • Elevated cholesterol    • GERD (gastroesophageal reflux disease)        Past Surgical History:   Procedure Laterality Date   • CARDIAC CATHETERIZATION     • COLON SURGERY     • COLONOSCOPY     • SKIN BIOPSY         PT ASSESSMENT (last 12 hours)     IRF PT Evaluation and Treatment     Row Name 09/15/22 1137          PT Time and Intention    Document Type daily treatment  -DP     Mode of Treatment individual therapy;physical therapy  -DP     Row Name 09/15/22 1137          General Information    Patient Profile Reviewed yes  -DP     Existing Precautions/Restrictions fall;oxygen therapy device and L/min;other (see comments)  Pt was on 2L during treatment and then titrated to 1L as she was able to ambulate 20 feet and stay at >=93%  -DP     Row Name 09/15/22 1137          Pain Assessment    Pretreatment Pain Rating 0/10 - no pain  -DP     Posttreatment Pain Rating 0/10 - no pain  -DP     Pre/Posttreatment Pain Comment Pt did complain of pain during bed mobility but otherwise had no complaints of pain  -DP     Row Name 09/15/22 1137          Cognition/Psychosocial    Affect/Mental Status (Cognition) WFL;anxious  -DP     Orientation Status (Cognition) oriented x 3  -DP     Follows Commands (Cognition) follows one-step commands  -DP     Personal Safety Interventions safety round/check completed;elopement precautions initiated;fall prevention program maintained;gait belt;muscle strengthening facilitated;nonskid shoes/slippers when out of bed;supervised activity  -DP     Row  Name 09/15/22 1137          Bed Mobility    Bed Mobility rolling left;supine-sit;sit-supine  -DP     Rolling Left [place] (Bed Mobility) verbal cues;minimum assist (75% patient effort)  -DP     Supine-Sit [place] (Bed Mobility) verbal cues;minimum assist (75% patient effort)  -DP     Sit-Supine [place] (Bed Mobility) moderate assist (50% patient effort)  -DP     Comment, (Bed Mobility) performed on mat table  -DP     Row Name 09/15/22 1137          Transfer Assessment/Treatment    Transfers bed-chair transfer;chair-bed transfer;sit-stand transfer;stand-sit transfer;stand pivot/stand step transfer;toilet transfer  -DP     Comment, (Transfers) M[place] but requires extra time to perform  -DP     Row Name 09/15/22 1137          Transfers    Bed-Chair [place] (Transfers) verbal cues;moderate assist (50% patient effort)  -DP     Chair-Bed [place] (Transfers) moderate assist (50% patient effort);verbal cues;nonverbal cues (demo/gesture)  -DP     Assistive Device (Bed-Chair Transfers) wheelchair  -DP     Sit-Stand [place] (Transfers) minimum assist (75% patient effort);moderate assist (50% patient effort);verbal cues  -DP     Stand-Sit [place] (Transfers) verbal cues;minimum assist (75% patient effort)  -DP     [place] Level (Toilet Transfer) moderate assist (50% patient effort);verbal cues;nonverbal cues (demo/gesture)  -DP     Assistive Device (Toilet Transfer) raised toilet seat;grab bars/safety frame;wheelchair  -DP     Row Name 09/15/22 1137          Chair-Bed Transfer    Assistive Device (Chair-Bed Transfers) wheelchair  -DP     Row Name 09/15/22 1137          Sit-Stand Transfer    Assistive Device (Sit-Stand Transfers) wheelchair  -DP     Row Name 09/15/22 1137          Stand-Sit Transfer    Assistive Device (Stand-Sit Transfers) wheelchair  -DP     Row Name 09/15/22 1137          Stand Pivot/Stand Step Transfer    Stand Pivot/Stand Step [place] (Transfers) moderate assist  (50% patient effort);verbal cues  -DP     Assistive Device (Stand Pivot Stand Step Transfer) wheelchair  -DP     Row Name 09/15/22 1137          Toilet Transfer    Type (Toilet Transfer) stand pivot/stand step  -DP     Row Name 09/15/22 1137          Gait/Stairs (Locomotion)    [place] Level (Gait) minimum assist (75% patient effort);verbal cues;1 person to manage equipment;other (see comments)  -DP     Assistive Device (Gait) walker, front-wheeled  -DP     Distance in Feet (Gait) 30'x1, 20'x3  -DP     Pattern (Gait) step-through;step-to  -DP     Deviations/Abnormal Patterns (Gait) base of support, narrow;festinating/shuffling;hunter decreased;gait speed decreased;stride length decreased  -DP     Bilateral Gait Deviations forward flexed posture;foot drop/toe drag  -DP     Gait Assessment/Intervention Pt is quick to fatigue and during 30 feet trial pt required one standing rest break. Pt started on 2L but was able to ambulate 30 feet and stay at 95% o2 so pt was titrated to 1L and maintained >=93% SpO2  -DP     Row Name 09/15/22 1137          Safety Issues, Functional Mobility    Impairments Affecting Function (Mobility) balance;cognition;endurance/activity tolerance;pain;postural/trunk control;strength  -DP     Row Name 09/15/22 1137          Balance    Comment, Balance Pt sat on toilet with SBA for 5 mins and no LOB. Pt was able to stand with PT performing toilet hygiene for pt and to don underwear and pants.  -DP     Row Name 09/15/22 1137          Motor Skills    Therapeutic Exercise hip;knee  -DP     Row Name 09/15/22 1137          Hip (Therapeutic Exercise)    Hip (Therapeutic Exercise) isometric exercises  -DP     Hip Isometrics (Therapeutic Exercise) bilateral;gluteal sets;10 repetitions;supine  -DP     Row Name 09/15/22 1137          Knee (Therapeutic Exercise)    Knee (Therapeutic Exercise) strengthening exercise  -DP     Knee Strengthening (Therapeutic Exercise) bilateral;SAQ (short arc  quad);supine;10 repetitions  -DP     Row Name 09/15/22 1137          Positioning and Restraints    Pre-Treatment Position sitting in chair/recliner  -DP     Post Treatment Position wheelchair  -DP     In Wheelchair with OT  -DP           User Key  (r) = Recorded By, (t) = Taken By, (c) = Cosigned By    Initials Name Provider Type    DP Preston Hills, PT Physical Therapist              Wound 09/09/22 2200 Right upper arm Laceration (Active)   Dressing Appearance dry;intact 09/14/22 2250   Closure Sutures 09/14/22 1536   Base dressing in place, unable to visualize 09/14/22 2250   Drainage Amount none 09/15/22 0703   Care, Wound cleansed with;sterile water 09/14/22 1536   Dressing Care dressing changed;foam 09/14/22 1536       Wound 09/09/22 2200 Left midline abdomen Incision (Active)   Dressing Appearance open to air 09/14/22 2250   Closure Approximated 09/14/22 2250   Base closed/resurfaced 09/14/22 2250   Drainage Amount none 09/15/22 0703   Dressing Care open to air 09/15/22 0703     Physical Therapy Education                 Title: PT OT SLP Therapies (Done)     Topic: Physical Therapy (Done)     Point: Mobility training (Done)     Learning Progress Summary           Patient Acceptance, E,D, VU,DU by DP at 9/15/2022 1144    Acceptance, E,D, VU,DU by DP at 9/14/2022 1143    Acceptance, E, VU,NR by MG at 9/13/2022 1143    Acceptance, E,TB, VU,NR by EE at 9/10/2022 1518                   Point: Home exercise program (Done)     Learning Progress Summary           Patient Acceptance, E,D, VU,DU by DP at 9/15/2022 1144    Acceptance, E,D, VU,DU by DP at 9/14/2022 1143    Acceptance, E, VU,NR by MG at 9/13/2022 1143    Acceptance, E,TB, VU,NR by EE at 9/10/2022 1518                   Point: Body mechanics (Done)     Learning Progress Summary           Patient Acceptance, E,D, VU,DU by DP at 9/15/2022 1144    Acceptance, E,D, VU,DU by DP at 9/14/2022 1143    Acceptance, E, VU,NR by MG at 9/13/2022 1143    Acceptance,  E,TB, VU,NR by EE at 9/10/2022 1518                   Point: Precautions (Done)     Learning Progress Summary           Patient Acceptance, E,D, VU,DU by DP at 9/15/2022 1144    Acceptance, E,D, VU,DU by DP at 9/14/2022 1143    Acceptance, E, VU,NR by MG at 9/13/2022 1143    Acceptance, E, VU by MD at 9/12/2022 1114    Acceptance, E,TB, VU,NR by EE at 9/10/2022 1518                               User Key     Initials Effective Dates Name Provider Type Discipline    EE 06/16/21 -  Rand Patel, PT Physical Therapist PT    MD 06/16/21 -  Ale Garza, PT Physical Therapist PT    MG 05/24/22 -  Elisabeth Bowen, PT Physical Therapist PT    DP 08/24/21 -  Preston Hills, PT Physical Therapist PT                PT Recommendation and Plan                          Time Calculation:      PT Charges     Row Name 09/15/22 1522 09/15/22 1144          Time Calculation    Start Time 1330  -DP 1100  -DP     Stop Time 1400  -DP 1130  -DP     Time Calculation (min) 30 min  -DP 30 min  -DP     PT Received On -- 09/15/22  -DP     PT - Next Appointment -- 09/16/22  -DP            Time Calculation- PT    Total Timed Code Minutes- PT 30 minute(s)  -DP 30 minute(s)  -DP           User Key  (r) = Recorded By, (t) = Taken By, (c) = Cosigned By    Initials Name Provider Type    DP Preston Hills, PT Physical Therapist                Therapy Charges for Today     Code Description Service Date Service Provider Modifiers Qty    46464598794 HC GAIT TRAINING EA 15 MIN 9/14/2022 JeanaLety arriagan, PT GP 1    43267308116 HC PT THER PROC EA 15 MIN 9/14/2022 JeanaLetyn, PT GP 1    17216822977 HC PT THER SUPP EA 15 MIN 9/14/2022 JeanaLety moyan, PT GP 1    48573560809 HC GAIT TRAINING EA 15 MIN 9/14/2022 JeanaBaljinderPreston, PT GP 1    59105785439 HC PT THER PROC EA 15 MIN 9/14/2022 JeanaLety arriagan, PT GP 1    17311631734 HC PT THER PROC EA 15 MIN 9/15/2022 Preston Hills, PT GP 1    87453539059 HC PT THERAPEUTIC ACT EA 15 MIN 9/15/2022 Preston Hills, PT GP 1     [id number]  PT THER SUPP EA 15 MIN 9/15/2022 Preston Hills, PT GP 1    [id number]  PT THERAPEUTIC ACT EA 15 MIN 9/15/2022 Preston Hills, PT GP 1    [id number]  GAIT TRAINING EA 15 MIN 9/15/2022 Preston Hills, PT GP 1              Patient was wearing a face mask during this therapy encounter. Therapist used appropriate personal protective equipment including mask and gloves.  Mask used was standard procedure mask. Appropriate PPE was worn during the entire therapy session. Hand hygiene was completed before and after therapy session. Patient is not in enhanced droplet precautions.         Preston Hills PT  9/15/2022

## 2022-09-15 NOTE — THERAPY TREATMENT NOTE
Inpatient Rehabilitation - Occupational Therapy Treatment Note    [facility]     Patient Name: Summer Smith  : 1950  MRN: [id number]    Today's Date: 9/15/2022                 Admit Date: 2022         ICD-10-CM ICD-9-CM   1. Follow-up exam  Z09 V67.9       Patient Active Problem List   Diagnosis   • Aneurysm (HCC)       Past Medical History:   Diagnosis Date   • COPD (chronic obstructive pulmonary disease) (HCC)    • Elevated cholesterol    • GERD (gastroesophageal reflux disease)        Past Surgical History:   Procedure Laterality Date   • CARDIAC CATHETERIZATION     • COLON SURGERY     • COLONOSCOPY     • SKIN BIOPSY               IRF OT ASSESSMENT FLOWSHEET (last 12 hours)     IRF OT Evaluation and Treatment     Row Name 09/15/22 1612 09/15/22 1608       OT Time and Intention    Document Type progress note  -DN progress note  -DN    Mode of Treatment occupational therapy  -DN occupational therapy  -DN    Patient Effort good  -DN good  -DN    Row Name 09/15/22 1159          OT Time and Intention    Document Type progress note  -DN     Mode of Treatment occupational therapy  -DN     Patient Effort good  -DN     Row Name 09/15/22 1159          Pain Assessment    Pretreatment Pain Rating 2/10  -DN     Posttreatment Pain Rating 2/10  -DN     Pain Location incisional  -DN     Pain Location - abdomen  -DN     Row Name 09/15/22 1159          Cognition/Psychosocial    Affect/Mental Status (Cognition) WFL;anxious  -DN     Follows Commands (Cognition) follows one-step commands;over 90% accuracy;increased processing time needed;initiation impaired;physical/tactile prompts required  -DN     Row Name 09/15/22 1159          Bathing    Comment (Bathing) pt pre dressed by nsg aid prior to OT session  -DN     Row Name 09/15/22 1159          Lower Body Dressing    [place] Level (Lower Body Dressing) dependent (less than 25% patient effort)  -DN     Position (Lower Body Dressing) supported sitting  -DN      Set-up Assistance (Lower Body Dressing) anti-embolic stockings  -DN     Comment (Lower Body Dressing) pt now has thigh high jobst stockings  -DN     Row Name 09/15/22 1159          Grooming    [place] Level (Grooming) grooming skills;deodorant application;hair care, combing/brushing;oral care regimen;supervision  -DN     Assistive Device (Grooming) long-handled comb/brush  -DN     Position (Grooming) supported sitting;supported standing  -DN     Set-up Assistance (Grooming) obtain supplies  -DN     Row Name 09/15/22 1612          Transfer Assessment/Treatment    Comment, (Transfers) Min with stand pivot sit w/c to recliner  -DN     Row Name 09/15/22 1612 09/15/22 1608       Shoulder (Therapeutic Exercise)    Shoulder (Therapeutic Exercise) strengthening exercise  -DN strengthening exercise  -DN    Shoulder AROM (Therapeutic Exercise) bilateral;10 repetitions  -DN 10 repetitions;3 sets  -DN    Shoulder Strengthening (Therapeutic Exercise) 1 lb free weight  -DN resistance band;yellow  -DN    Row Name 09/15/22 1159          Shoulder (Therapeutic Exercise)    Shoulder (Therapeutic Exercise) wand exercises  -DN     Shoulder AROM (Therapeutic Exercise) bilateral;10 repetitions;3 sets  -DN     Shoulder Strengthening (Therapeutic Exercise) 1 lb free weight  -DN     Row Name 09/15/22 1612 09/15/22 1608       Elbow/Forearm (Therapeutic Exercise)    Elbow/Forearm (Therapeutic Exercise) strengthening exercise  -DN strengthening exercise  -DN    Elbow/Forearm AROM (Therapeutic Exercise) bilateral;10 repetitions;3 sets  -DN bilateral;10 repetitions;3 sets  -DN    Elbow/Forearm Strengthening (Therapeutic Exercise) 2 lb free weight  -DN 2 lb free weight  -DN    Row Name 09/15/22 1612 09/15/22 1608       Wrist (Therapeutic Exercise)    Wrist (Therapeutic Exercise) strengthening exercise  -DN strengthening exercise  -DN    Wrist AROM (Therapeutic Exercise) bilateral;10 repetitions;3 sets  -DN bilateral;10 repetitions;3 sets  -DN     Wrist Strengthening (Therapeutic Exercise) 2 lb free weight  -DN 2 lb free weight  -DN    Row Name 09/15/22 1612 09/15/22 1608       Hand (Therapeutic Exercise)    Hand (Therapeutic Exercise) strengthening exercise  -DN strengthening exercise  -DN    Hand AROM/AAROM (Therapeutic Exercise) bilateral;10 repetitions;3 sets  -DN bilateral;10 repetitions  -DN    Hand Strengthening (Therapeutic Exercise) hand gripper  -DN hand gripper  -DN    Row Name 09/15/22 1608          Balance    Static Standing Balance contact guard;verbal cues;non-verbal cues (demo/gesture)  standing at table with BUE activity with mild weight shift L to R with No buckle today  -DN     Row Name 09/15/22 1612 09/15/22 1608       Positioning and Restraints    Pre-Treatment Position sitting in chair/recliner  -DN sitting in chair/recliner  -DN    Post Treatment Position chair  -DN wheelchair  -DN    In Bed sitting;call light within reach;encouraged to call for assist;exit alarm on  -DN sitting;call light within reach;encouraged to call for assist;exit alarm on  -DN    Row Name 09/15/22 1159          Positioning and Restraints    Pre-Treatment Position sitting in chair/recliner  -DN     Post Treatment Position wheelchair  -DN     In Bed sitting;call light within reach;encouraged to call for assist;exit alarm on  -DN           User Key  (r) = Recorded By, (t) = Taken By, (c) = Cosigned By    Initials Name Effective Dates    Sina Currie OT 06/16/21 -                          OT Recommendation and Plan                         Time Calculation:      Time Calculation- OT     Row Name 09/15/22 1400 09/15/22 0830          Time Calculation- OT    OT Start Time 1400  -DN 0830  -DN     OT Stop Time 1430  -DN 0900  -DN     OT Time Calculation (min) 30 min  -DN 30 min  -DN           User Key  (r) = Recorded By, (t) = Taken By, (c) = Cosigned By    Initials Name Provider Type    Sina Currie OT Occupational Therapist              Therapy Charges for  Today     Code Description Service Date Service Provider Modifiers Qty    65753198819 HC OT SELF CARE/MGMT/TRAIN EA 15 MIN 9/14/2022 Sina Walls OT GO 2    84334173371 HC OT THER PROC EA 15 MIN 9/14/2022 Sina Walls OT GO 2    40866871937 HC OT SELF CARE/MGMT/TRAIN EA 15 MIN 9/15/2022 Sina Walls OT GO 1    11742066800 HC OT THER PROC EA 15 MIN 9/15/2022 Sina Walls OT GO 1    70550804508 HC OT THER PROC EA 15 MIN 9/15/2022 Sina Walls OT GO 2    52067472280 HC OT THER PROC EA 15 MIN 9/15/2022 Sina Walls OT GO 2                   Sina Walls OT  9/15/2022

## 2022-09-15 NOTE — THERAPY TREATMENT NOTE
Inpatient Rehabilitation - Occupational Therapy Treatment Note    [facility]     Patient Name: Summer Smith  : 1950  MRN: [id number]    Today's Date: 9/15/2022                 Admit Date: 2022         ICD-10-CM ICD-9-CM   1. Follow-up exam  Z09 V67.9       Patient Active Problem List   Diagnosis   • Aneurysm (HCC)       Past Medical History:   Diagnosis Date   • COPD (chronic obstructive pulmonary disease) (HCC)    • Elevated cholesterol    • GERD (gastroesophageal reflux disease)        Past Surgical History:   Procedure Laterality Date   • CARDIAC CATHETERIZATION     • COLON SURGERY     • COLONOSCOPY     • SKIN BIOPSY               IRF OT ASSESSMENT FLOWSHEET (last 12 hours)     IRF OT Evaluation and Treatment     Row Name 09/15/22 1608 09/15/22 1159       OT Time and Intention    Document Type progress note  -DN progress note  -DN    Mode of Treatment occupational therapy  -DN occupational therapy  -DN    Patient Effort good  -DN good  -DN    Row Name 09/15/22 1159          Pain Assessment    Pretreatment Pain Rating 2/10  -DN     Posttreatment Pain Rating 2/10  -DN     Pain Location incisional  -DN     Pain Location - abdomen  -DN     Row Name 09/15/22 1159          Cognition/Psychosocial    Affect/Mental Status (Cognition) WFL;anxious  -DN     Follows Commands (Cognition) follows one-step commands;over 90% accuracy;increased processing time needed;initiation impaired;physical/tactile prompts required  -DN     Row Name 09/15/22 1159          Bathing    Comment (Bathing) pt pre dressed by nsg aid prior to OT session  -DN     Row Name 09/15/22 1159          Lower Body Dressing    [place] Level (Lower Body Dressing) dependent (less than 25% patient effort)  -DN     Position (Lower Body Dressing) supported sitting  -DN     Set-up Assistance (Lower Body Dressing) anti-embolic stockings  -DN     Comment (Lower Body Dressing) pt now has thigh high jobst stockings  -DN     Row Name 09/15/22  1159          Grooming    [place] Level (Grooming) grooming skills;deodorant application;hair care, combing/brushing;oral care regimen;supervision  -DN     Assistive Device (Grooming) long-handled comb/brush  -DN     Position (Grooming) supported sitting;supported standing  -DN     Set-up Assistance (Grooming) obtain supplies  -DN     Row Name 09/15/22 1608 09/15/22 1159       Shoulder (Therapeutic Exercise)    Shoulder (Therapeutic Exercise) strengthening exercise  -DN wand exercises  -DN    Shoulder AROM (Therapeutic Exercise) 10 repetitions;3 sets  -DN bilateral;10 repetitions;3 sets  -DN    Shoulder Strengthening (Therapeutic Exercise) resistance band;yellow  -DN 1 lb free weight  -DN    Row Name 09/15/22 1608          Elbow/Forearm (Therapeutic Exercise)    Elbow/Forearm (Therapeutic Exercise) strengthening exercise  -DN     Elbow/Forearm AROM (Therapeutic Exercise) bilateral;10 repetitions;3 sets  -DN     Elbow/Forearm Strengthening (Therapeutic Exercise) 2 lb free weight  -DN     Row Name 09/15/22 1608          Wrist (Therapeutic Exercise)    Wrist (Therapeutic Exercise) strengthening exercise  -DN     Wrist AROM (Therapeutic Exercise) bilateral;10 repetitions;3 sets  -DN     Wrist Strengthening (Therapeutic Exercise) 2 lb free weight  -DN     Row Name 09/15/22 1608          Hand (Therapeutic Exercise)    Hand (Therapeutic Exercise) strengthening exercise  -DN     Hand AROM/AAROM (Therapeutic Exercise) bilateral;10 repetitions  -DN     Hand Strengthening (Therapeutic Exercise) hand gripper  -DN     Row Name 09/15/22 1608          Balance    Static Standing Balance contact guard;verbal cues;non-verbal cues (demo/gesture)  standing at table with BUE activity with mild weight shift L to R with No buckle today  -DN     Row Name 09/15/22 1608 09/15/22 1159       Positioning and Restraints    Pre-Treatment Position sitting in chair/recliner  -DN sitting in chair/recliner  -DN    Post Treatment Position  wheelchair  -DN wheelchair  -DN    In Bed sitting;call light within reach;encouraged to call for assist;exit alarm on  -DN sitting;call light within reach;encouraged to call for assist;exit alarm on  -DN          User Key  (r) = Recorded By, (t) = Taken By, (c) = Cosigned By    Initials Name Effective Dates    Sina Currie OT 06/16/21 -                          OT Recommendation and Plan                         Time Calculation:      Time Calculation- OT     Row Name 09/15/22 1400 09/15/22 0830          Time Calculation- OT    OT Start Time 1400  -DN 0830  -DN     OT Stop Time 1430  -DN 0900  -DN     OT Time Calculation (min) 30 min  -DN 30 min  -DN           User Key  (r) = Recorded By, (t) = Taken By, (c) = Cosigned By    Initials Name Provider Type    Sina Currie OT Occupational Therapist              Therapy Charges for Today     Code Description Service Date Service Provider Modifiers Qty    00479244971 HC OT SELF CARE/MGMT/TRAIN EA 15 MIN 9/14/2022 Sina Walls OT GO 2    19990349498 HC OT THER PROC EA 15 MIN 9/14/2022 Sina Walls OT GO 2    38570345713 HC OT SELF CARE/MGMT/TRAIN EA 15 MIN 9/15/2022 Sina Walls OT GO 1    61546976355 HC OT THER PROC EA 15 MIN 9/15/2022 Sina Walls OT GO 1    33745521466 HC OT THER PROC EA 15 MIN 9/15/2022 Sina Walls OT GO 2                   Sina Walls OT  9/15/2022

## 2022-09-15 NOTE — PROGRESS NOTES
LOS: 6 days   Patient Care Team:  Wale Jacob MD as PCP - General (Family Medicine)      JASBIR MAHMOOD  1950         ADMITTING DIAGNOSIS:  Immobility syndrome    Subjective     Fatigue continues to be an issue.  Still with some orthostatic symptoms but better.  Baseline abdominal discomfort.        Objective     Vitals:    09/15/22 0711   BP: 103/51   Pulse: 66   Resp:    Temp:    SpO2: 95%       PHYSICAL EXAM:     Awake, alert and 0x3  NAD  Heart rr no m/g/r  Lungs cta bilat- is on 02 per nc  abd-  Colostomy  Normoactive bowel sounds.  Soft and nontender  Incision-dressed  Ext: right elbow laceration sutured-dressed  Moving all 4 extremities- generalized weakness  1+ edema bilateral lower extremities    MEDICATIONS  Scheduled Meds:amLODIPine, 5 mg, Oral, Q24H  atorvastatin, 40 mg, Oral, Nightly  cholecalciferol, 2,000 Units, Oral, Daily  enoxaparin, 40 mg, Subcutaneous, Nightly  lisinopril, 5 mg, Oral, Q24H  pantoprazole, 40 mg, Oral, Q AM  sertraline, 25 mg, Oral, Daily      Continuous Infusions:   PRN Meds:.•  acetaminophen  •  albuterol  •  HYDROcodone-acetaminophen **OR** HYDROcodone-acetaminophen      RESULTS  No results found for: POCGLU  Results from last 7 days   Lab Units 09/15/22  0552 09/13/22  0651   WBC 10*3/mm3 5.91 8.05   HEMOGLOBIN g/dL 9.0* 9.7*   HEMATOCRIT % 28.6* 30.3*   PLATELETS 10*3/mm3 326 391     Results from last 7 days   Lab Units 09/15/22  0552   SODIUM mmol/L 142   POTASSIUM mmol/L 3.8   CHLORIDE mmol/L 103   CO2 mmol/L 33.0*   BUN mg/dL 9   CREATININE mg/dL 0.44*   CALCIUM mg/dL 8.8   GLUCOSE mg/dL 91       Echocardiogram-September 1, 2022  Summary:                 The ejection fraction biplane was calculated at 65%.                            Intravenous contrast was used to enhance endocardial border definition.                            The left ventricular chamber size, wall thickness, and systolic function are within normal limits. There are                             no regional wall motion abnormalities observed.                            Abnormal left ventricular diastolic filling consistent with impaired relaxation.                            The peak instantaneous gradient of the aortic valve is 28.7 mmHg. The mean gradient of the aortic valve                            is 16 mmHg.                            Mild aortic leaflet calcification.                            Mild aortic stenosis.                            Trace aortic regurgitation is noted.       ASSESSMENT and PLAN    Aneurysm (HCC)    Immobility Syndrome  1.  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 Will teach family colostomy care. Pain controlled. Encouraged to at least take pain meds prior to therapy    3.  HTN- stable on norvasc, lisinopril. Running low.  Decreased lisinopril to 5 mg.     4.  Depression - low dose zoloft    5.  DVT proph- was on lovenox-Will continue 40 mg daily for now    6. Pulm - nasal cannula O2. Wean, keep sats > 90%  September 13-Taper down to 2 L    7. Right lateral elbow laceration - August 31 - sutured    8.  Orthostatic hypotension-she has bilateral lower extremity edema but will hold on adding diuretic given her orthostatic hypotension.  We will try thigh-high Jobst stockings 15-20 mm compression for both orthostasis as well as edema.  Echocardiogram recently showed ejection fraction 65%.  Recheck CBC and chemistries in a.m.      TEAM CONF - SEPT 13 - TRANSFERS MOD . GAIT 8 FEET PARALLEL BARS MOD ASSIST. TOILET TRANSFERS MIN MOD. BATH MOD. LBD DEP. UBD MIN. EATING MIN. GROOMING MIN. TOILETING DEP. FATIGUES. USING PUREWICK.   OSTOMY EDUCATION. MEPILEX TO RIGHT ELBOW LACERATION. COGNITION - MILD TO MODERATE COGNITIVE IMPAIRMENT. VISUAL SPATIAL DEFICITS. OSTOMY EDUCATION. ABDOMINAL INCISION HEALING WITH JUST ONE SMALL AREA INFERIORLY WITH SLIGHT DRAINAGE. WILL DISCONTINUE PUREWICK. TO DO  TIMED VOIDS.  PULMONARY - WEAN O2 .   ELOS - THREE WEEKS    Now admit for comprehensive acute inpatient rehabilitation .  This would be an interdisciplinary program with physical therapy 1.5 hour,  occupational therapy 1.5 hour,  5 days a week.  Rehabilitation nursing for carryover, monitoring of  GI   status, bowel and bladder, and skin  Ongoing physician follow-up.  Weekly team conferences.  Goals are to achieve a level of supervision with  mobility and self-care and improved activity tolerance.   Rehabilitation prognosis air.  Medical prognosis fair.  Estimated length of stay is approximately 10 days , but is only an estimation.     The patient's functional status and clinical status is unchanged from preadmission assessment and the patient continues appropriate for acute inpatient rehabilitation.  Goal is for home with  Home health   therapies.  Barrier to discharge:  Impaired mobility and self care   - work on  Transfers, balance, gait, ADLS  to overcome.             Hossein Rivers MD      During rounds, used appropriate personal protective equipment including mask and gloves.  Additional gown if indicated.  Mask used was standard procedure mask. Appropriate PPE was worn during the entire visit.  Hand hygiene was completed before and after.

## 2022-09-16 PROCEDURE — 97130 THER IVNTJ EA ADDL 15 MIN: CPT

## 2022-09-16 PROCEDURE — 97535 SELF CARE MNGMENT TRAINING: CPT

## 2022-09-16 PROCEDURE — 97110 THERAPEUTIC EXERCISES: CPT

## 2022-09-16 PROCEDURE — 97530 THERAPEUTIC ACTIVITIES: CPT

## 2022-09-16 PROCEDURE — 97129 THER IVNTJ 1ST 15 MIN: CPT

## 2022-09-16 PROCEDURE — 25010000002 ENOXAPARIN PER 10 MG: Performed by: PHYSICAL MEDICINE & REHABILITATION

## 2022-09-16 PROCEDURE — 97116 GAIT TRAINING THERAPY: CPT

## 2022-09-16 RX ADMIN — HYDROCODONE BITARTRATE AND ACETAMINOPHEN 1 TABLET: 5; 325 TABLET ORAL at 22:20

## 2022-09-16 RX ADMIN — AMLODIPINE BESYLATE 5 MG: 5 TABLET ORAL at 09:45

## 2022-09-16 RX ADMIN — ATORVASTATIN CALCIUM 40 MG: 20 TABLET, FILM COATED ORAL at 19:47

## 2022-09-16 RX ADMIN — ENOXAPARIN SODIUM 40 MG: 100 INJECTION SUBCUTANEOUS at 19:48

## 2022-09-16 RX ADMIN — Medication 2000 UNITS: at 09:45

## 2022-09-16 RX ADMIN — PANTOPRAZOLE SODIUM 40 MG: 40 TABLET, DELAYED RELEASE ORAL at 05:41

## 2022-09-16 RX ADMIN — HYDROCODONE BITARTRATE AND ACETAMINOPHEN 1 TABLET: 5; 325 TABLET ORAL at 09:50

## 2022-09-16 RX ADMIN — SERTRALINE 25 MG: 25 TABLET, FILM COATED ORAL at 09:45

## 2022-09-16 RX ADMIN — LISINOPRIL 5 MG: 5 TABLET ORAL at 09:46

## 2022-09-16 NOTE — PLAN OF CARE
Goal Outcome Evaluation:              Outcome Evaluation: pt educated on edema, CHF, sodium intake, and cardiac care and diet. pt with BLE 3+ pitting edema, wraps applied

## 2022-09-16 NOTE — PROGRESS NOTES
LOS: 7 days   Patient Care Team:  Wale Jacob MD as PCP - General (Family Medicine)      JASBIR MAHMOOD  1950         ADMITTING DIAGNOSIS:  Immobility syndrome    Subjective     She is walking further in therapies.  Abdominal discomfort is improved.  Reports colostomy output.  Still with edema in the legs.  Discussed not utilizing diuretic and continue with compression stockings given her recent orthostatic symptoms.  Transfers contact-guard min assist.  Ambulated up to 45 feet contact-guard rolling walker.      Objective     Vitals:    09/16/22 1205   BP: 131/63   Pulse: 72   Resp: 18   Temp: 97.5 °F (36.4 °C)   SpO2: 92%       PHYSICAL EXAM:     Awake, alert and 0x3  NAD  Heart: Regular rate and rhythm.  No rub murmur or gallop.  Lungs: Clear to auscultation bilaterally  Abdomen: Colostomy  Normoactive bowel sounds.  Soft and nontender  Incision-dressed  Ext: right elbow laceration sutured-dressed  Moving all 4 extremities- generalized weakness  1+ edema bilateral lower extremities    MEDICATIONS  Scheduled Meds:amLODIPine, 5 mg, Oral, Q24H  atorvastatin, 40 mg, Oral, Nightly  cholecalciferol, 2,000 Units, Oral, Daily  enoxaparin, 40 mg, Subcutaneous, Nightly  lisinopril, 5 mg, Oral, Q24H  pantoprazole, 40 mg, Oral, Q AM  sertraline, 25 mg, Oral, Daily      Continuous Infusions:   PRN Meds:.•  acetaminophen  •  albuterol  •  HYDROcodone-acetaminophen **OR** [DISCONTINUED] HYDROcodone-acetaminophen      RESULTS  No results found for: POCGLU  Results from last 7 days   Lab Units 09/15/22  0552 09/13/22  0651   WBC 10*3/mm3 5.91 8.05   HEMOGLOBIN g/dL 9.0* 9.7*   HEMATOCRIT % 28.6* 30.3*   PLATELETS 10*3/mm3 326 391     Results from last 7 days   Lab Units 09/15/22  0552   SODIUM mmol/L 142   POTASSIUM mmol/L 3.8   CHLORIDE mmol/L 103   CO2 mmol/L 33.0*   BUN mg/dL 9   CREATININE mg/dL 0.44*   CALCIUM mg/dL 8.8   GLUCOSE mg/dL 91       Echocardiogram-September 1, 2022  Summary:                 The  ejection fraction biplane was calculated at 65%.                            Intravenous contrast was used to enhance endocardial border definition.                            The left ventricular chamber size, wall thickness, and systolic function are within normal limits. There are                            no regional wall motion abnormalities observed.                            Abnormal left ventricular diastolic filling consistent with impaired relaxation.                            The peak instantaneous gradient of the aortic valve is 28.7 mmHg. The mean gradient of the aortic valve                            is 16 mmHg.                            Mild aortic leaflet calcification.                            Mild aortic stenosis.                            Trace aortic regurgitation is noted.       ASSESSMENT and PLAN    Aneurysm (HCC)    Immobility Syndrome  1.  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 Will teach family colostomy care. Pain controlled. Encouraged to at least take pain meds prior to therapy    3.  HTN- stable on norvasc, lisinopril. Running low.  Decreased lisinopril to 5 mg.   September 16-blood pressure 128-131/60-63    4.  Depression - low dose zoloft    5.  DVT proph- was on lovenox-Will continue 40 mg daily for now    6. Pulm - nasal cannula O2. Wean, keep sats > 90%  September 13-Taper down to 2 L  September 16-sats 92% on room air    7. Right lateral elbow laceration - August 31 - sutured  September 16-as incisions located over a mobile joint, will continue sutures until Monday, September 19.    8.  Orthostatic hypotension-she has bilateral lower extremity edema but will hold on adding diuretic given her orthostatic hypotension.  We will try thigh-high Jobst stockings 15-20 mm compression for both orthostasis as well as edema.  Echocardiogram recently showed ejection fraction 65%.  Recheck CBC and  chemistries in a.m.      TEAM CONF - SEPT 13 - TRANSFERS MOD . GAIT 8 FEET PARALLEL BARS MOD ASSIST. TOILET TRANSFERS MIN MOD. BATH MOD. LBD DEP. UBD MIN. EATING MIN. GROOMING MIN. TOILETING DEP. FATIGUES. USING PUREWICK.   OSTOMY EDUCATION. MEPILEX TO RIGHT ELBOW LACERATION. COGNITION - MILD TO MODERATE COGNITIVE IMPAIRMENT. VISUAL SPATIAL DEFICITS. OSTOMY EDUCATION. ABDOMINAL INCISION HEALING WITH JUST ONE SMALL AREA INFERIORLY WITH SLIGHT DRAINAGE. WILL DISCONTINUE PUREWICK. TO DO TIMED VOIDS.  PULMONARY - WEAN O2 .   ELOS - THREE WEEKS    September 16-Transfers contact-guard min assist.  Ambulated up to 45 feet contact-guard rolling walker.      Now admit for comprehensive acute inpatient rehabilitation .  This would be an interdisciplinary program with physical therapy 1.5 hour,  occupational therapy 1.5 hour,  5 days a week.  Rehabilitation nursing for carryover, monitoring of  GI   status, bowel and bladder, and skin  Ongoing physician follow-up.  Weekly team conferences.  Goals are to achieve a level of supervision with  mobility and self-care and improved activity tolerance.   Rehabilitation prognosis air.  Medical prognosis fair.  Estimated length of stay is approximately 10 days , but is only an estimation.     The patient's functional status and clinical status is unchanged from preadmission assessment and the patient continues appropriate for acute inpatient rehabilitation.  Goal is for home with  Home health   therapies.  Barrier to discharge:  Impaired mobility and self care   - work on  Transfers, balance, gait, ADLS  to overcome.             Hossein Rivesr MD      During rounds, used appropriate personal protective equipment including mask and gloves.  Additional gown if indicated.  Mask used was standard procedure mask. Appropriate PPE was worn during the entire visit.  Hand hygiene was completed before and after.

## 2022-09-16 NOTE — THERAPY PROGRESS REPORT/RE-CERT
Inpatient Rehabilitation - Physical Therapy Progress Note and Treatment Note       [facility]     Patient Name: Summer Smith  : 1950  MRN: [id number]    Today's Date: 2022                    Admit Date: 2022      Visit Dx:     ICD-10-CM ICD-9-CM   1. Follow-up exam  Z09 V67.9       Patient Active Problem List   Diagnosis   • Aneurysm (HCC)       Past Medical History:   Diagnosis Date   • COPD (chronic obstructive pulmonary disease) (HCC)    • Elevated cholesterol    • GERD (gastroesophageal reflux disease)        Past Surgical History:   Procedure Laterality Date   • CARDIAC CATHETERIZATION     • COLON SURGERY     • COLONOSCOPY     • SKIN BIOPSY         PT ASSESSMENT (last 12 hours)     IRF PT Evaluation and Treatment     Row Name 22 1514          PT Time and Intention    Document Type daily treatment;progress note  -DP     Patient/Family/Caregiver Comments/Observations Pt reclined in recliner upon PT arrival  -DP     Row Name 22 1514          General Information    Patient Profile Reviewed yes  -DP     Existing Precautions/Restrictions fall;oxygen therapy device and L/min;other (see comments)  Pt at 0.5 NC O2 during ambulation today  -DP     Row Name 22 1514          Pain Assessment    Pretreatment Pain Rating 2/10  -DP     Posttreatment Pain Rating 2/10  -DP     Pain Location incisional  -DP     Pain Location - abdomen  -DP     Row Name 22 1514          Cognition/Psychosocial    Affect/Mental Status (Cognition) WFL  -DP     Orientation Status (Cognition) oriented x 3  -DP     Follows Commands (Cognition) follows one-step commands;over 90% accuracy;increased processing time needed;physical/tactile prompts required;repetition of directions required;verbal cues/prompting required  -DP     Personal Safety Interventions safety round/check completed;elopement precautions initiated;fall prevention program maintained;gait belt;muscle strengthening facilitated;nonskid  shoes/slippers when out of bed;supervised activity  -DP     Row Name 09/16/22 1514          Bed Mobility    Comment, (Bed Mobility) NT UIC  -DP     Row Name 09/16/22 1514          Transfer Assessment/Treatment    Transfers sit-stand transfer;stand-sit transfer;stand pivot/stand step transfer  -DP     Comment, (Transfers) [place] with all transfers performed today  -DP     Row Name 09/16/22 1514          Transfers    Sit-Stand [place] (Transfers) minimum assist (75% patient effort)  -DP     Stand-Sit [place] (Transfers) verbal cues;minimum assist (75% patient effort)  -DP     Row Name 09/16/22 1514          Sit-Stand Transfer    Assistive Device (Sit-Stand Transfers) wheelchair;walker, front-wheeled  -DP     Comment, (Sit-Stand Transfer) Pt performed 5X STS at 23 inch surface from the ground with CGA  -DP     Row Name 09/16/22 1514          Stand-Sit Transfer    Assistive Device (Stand-Sit Transfers) wheelchair  -DP     Row Name 09/16/22 1514          Stand Pivot/Stand Step Transfer    Stand Pivot/Stand Step [place] (Transfers) minimum assist (75% patient effort)  -DP     Assistive Device (Stand Pivot Stand Step Transfer) wheelchair  -DP     Comment, (Stand Pivot Transfer) performed 4x  -DP     Row Name 09/16/22 1514          Gait/Stairs (Locomotion)    [place] Level (Gait) contact guard  -DP     Assistive Device (Gait) walker, front-wheeled  -DP     Distance in Feet (Gait) 35'x1, 45'x1, 30'x2  -DP     Pattern (Gait) step-through;step-to  -DP     Deviations/Abnormal Patterns (Gait) base of support, narrow;festinating/shuffling;hunter decreased;gait speed decreased;stride length decreased  -DP     Bilateral Gait Deviations forward flexed posture;foot drop/toe drag  -DP     [place] Level (Stairs) contact guard;minimum assist (75% patient effort)  -DP     Assistive Device (Stairs) other (see comments)  // bars  -DP     Handrail Location (Stairs) both sides  -DP     Number of Steps (Stairs) 5  -DP      Ascending Technique (Stairs) step-to-step  -DP     Descending Technique (Stairs) step-to-step  -DP     Stairs, Safety Issues sequencing ability decreased;weight-shifting ability decreased  -DP     Stairs Assessment/Intervention Pt performed 4 inch in // bars  -DP     Comment, (Gait/Stairs) Pt ambulated with 0.5 NC O2 during treatment today and when siting down after ambulating 30 feet she was at 94 SpO2 and then dropped to 91%. Pt was instructed on PLB and increased back to 94%  -DP     Row Name 09/16/22 1514          Safety Issues, Functional Mobility    Impairments Affecting Function (Mobility) balance;cognition;endurance/activity tolerance;pain;postural/trunk control;strength  -DP     Row Name 09/16/22 1514          Motor Skills    Therapeutic Exercise knee;hip  -DP     Row Name 09/16/22 1514          Hip (Therapeutic Exercise)    Hip Strengthening (Therapeutic Exercise) bilateral;aBduction;sitting;resistance band;red;10 repetitions  -DP     Row Name 09/16/22 1514          Knee (Therapeutic Exercise)    Knee (Therapeutic Exercise) strengthening exercise  -DP     Knee Strengthening (Therapeutic Exercise) bilateral;hamstring curls;sitting;red;10 repetitions  -DP     Row Name 09/16/22 1514          Positioning and Restraints    Pre-Treatment Position sitting in chair/recliner  -DP     Post Treatment Position chair  -DP     In Chair sitting;call light within reach;encouraged to call for assist;exit alarm on  -DP     Row Name 09/16/22 1514          Weekly Progress Summary (PT)    Weekly Progress Summary (PT) Pt is progressing well with IP rehab PT. She has currently, short term transfer and ambulation goals and is now progressing toward remaining STG and LTG. Pt has decreased for 2L Nc to 0.5L NC during ambulation is is able to stay between 91-94%. Pt has ambulated up to 45 feet with FWW and is CGA. Pt worked on STS transfers for 23 inch surface and is progressing torward STS's from lower surfaces. Pt expected to  improve with functional mobility with continued skilled rehab PT services.  -DP     Row Name 09/16/22 1514          IRF PT Goals    Bed Mobility Goal Selection (PT-IRF) bed mobility, PT goal 1;bed mobility, PT goal 2  -DP     Transfer Goal Selection (PT-IRF) transfers, PT goal 1;transfers, PT goal 2;transfers, PT goal 3  -DP     Gait (Walking Locomotion) Goal Selection (PT-IRF) gait, PT goal 1;gait, PT goal 2  -DP     Wheelchair Locomotion Goal Selection (PT-IRF) wheelchair locomotion, PT goal 1  -DP     Row Name 09/16/22 1514          Bed Mobility Goal 1 (PT-IRF)    Activity/Assistive Device (Bed Mobility Goal 1, PT-IRF) sit to supine/supine to sit  -DP     [place] Level (Bed Mobility Goal 1, PT-IRF) minimum assist (75% or more patient effort)  -DP     Time Frame (Bed Mobility Goal 1, PT-IRF) 2 weeks;short-term goal (STG)  -DP     Progress/Outcomes (Bed Mobility Goal 1, PT-IRF) goal ongoing  -DP     Row Name 09/16/22 1514          Bed Mobility Goal 2 (PT-IRF)    Activity/Assistive Device (Bed Mobility Goal 2, PT-IRF) sit to supine/supine to sit;rolling to left;rolling to right  -DP     [place] Level (Bed Mobility Goal 2, PT-IRF) independent  -DP     Time Frame (Bed Mobility Goal 2, PT-IRF) 4 weeks;long-term goal (LTG)  -DP     Progress/Outcomes (Bed Mobility Goal 2, PT-IRF) goal ongoing  -DP     Row Name 09/16/22 1514          Transfer Goal 1 (PT-IRF)    Activity/Assistive Device (Transfer Goal 1, PT-IRF) sit-to-stand/stand-to-sit;bed-to-chair/chair-to-bed  -DP     [place] Level (Transfer Goal 1, PT-IRF) moderate assist (50-74% patient effort)  -DP     Time Frame (Transfer Goal 1, PT-IRF) 2 weeks;short-term goal (STG)  -DP     Progress/Outcomes (Transfer Goal 1, PT-IRF) goal met  -DP     Row Name 09/16/22 1514          Transfer Goal 2 (PT-IRF)    Activity/Assistive Device (Transfer Goal 2, PT-IRF) sit-to-stand/stand-to-sit;bed-to-chair/chair-to-bed  -DP     [place] Level (Transfer Goal 2,  PT-IRF) contact guard required  -DP     Time Frame (Transfer Goal 2, PT-IRF) 4 weeks;long-term goal (LTG)  -DP     Progress/Outcomes (Transfer Goal 2, PT-IRF) goal ongoing  -DP     Row Name 09/16/22 1514          Transfer Goal 3 (PT-IRF)    Activity/Assistive Device (Transfer Goal 3, PT-IRF) car transfer  -DP     [place] Level (Transfer Goal 3, PT-IRF) contact guard required  -DP     Time Frame (Transfer Goal 3, PT-IRF) 4 weeks;long-term goal (LTG)  -DP     Progress/Outcomes (Transfer Goal 3, PT-IRF) goal ongoing  -DP     Row Name 09/16/22 1514          Gait/Walking Locomotion Goal 1 (PT-IRF)    Activity/Assistive Device (Gait/Walking Locomotion Goal 1, PT-IRF) gait (walking locomotion);other (see comments)  -DP     Gait/Walking Locomotion Distance Goal 1 (PT-IRF) 8'  -DP     [place] Level (Gait/Walking Locomotion Goal 1, PT-IRF) minimum assist (75% or more patient effort)  -DP     Time Frame (Gait/Walking Locomotion Goal 1, PT-IRF) 2 weeks;short-term goal (STG)  -DP     Progress/Outcomes (Gait/Walking Locomotion Goal 1, PT-IRF) goal met  -DP     Row Name 09/16/22 1514          Gait/Walking Locomotion Goal 2 (PT-IRF)    Activity/Assistive Device (Gait/Walking Locomotion Goal 2, PT-IRF) gait (walking locomotion);walker, rolling  -DP     Gait/Walking Locomotion Distance Goal 2 (PT-IRF) 80'  -DP     [place] Level (Gait/Walking Locomotion Goal 2, PT-IRF) contact guard required  -DP     Time Frame (Gait/Walking Locomotion Goal 2, PT-IRF) 4 weeks;long-term goal (LTG)  -DP     Progress/Outcomes (Gait/Walking Locomotion Goal 2, PT-IRF) goal ongoing  -DP     Row Name 09/16/22 1514          Wheelchair Locomotion Goal 1 (PT-IRF)    Activity (Wheelchair Locomotion Goal 1, PT-IRF) forward propulsion;steering;turning  -DP     [place] Level (Wheelchair Locomotion Goal 1, PT-IRF) modified independence  -DP     Distance Goal 1 (Wheelchair Locomotion, PT-IRF) 80' x 1  -DP     Time Frame (Wheelchair Locomotion Goal  1, PT-IRF) 2 weeks;short-term goal (STG)  -DP     Progress/Outcomes (Wheelchair Locomotion Goal 1, PT-IRF) goal ongoing  -DP           User Key  (r) = Recorded By, (t) = Taken By, (c) = Cosigned By    Initials Name Provider Type    Preston Kim, PT Physical Therapist              Wound 09/09/22 2200 Right upper arm Laceration (Active)   Closure Sutures 09/16/22 0954   Base dressing in place, unable to visualize;dry 09/16/22 0954   Drainage Amount none 09/16/22 0954       Wound 09/09/22 2200 Left midline abdomen Incision (Active)   Dressing Appearance open to air 09/16/22 0954   Closure Approximated 09/15/22 2000   Drainage Amount none 09/16/22 0954     Physical Therapy Education                 Title: PT OT SLP Therapies (Done)     Topic: Physical Therapy (Done)     Point: Mobility training (Done)     Learning Progress Summary           Patient Acceptance, E,D, VU,DU by DP at 9/16/2022 1533    Acceptance, E, VU by WN at 9/16/2022 0123    Acceptance, E,D, VU,DU by DP at 9/15/2022 1144    Acceptance, E,D, VU,DU by DP at 9/14/2022 1143    Acceptance, E, VU,NR by MG at 9/13/2022 1143    Acceptance, E,TB, VU,NR by EE at 9/10/2022 1518                   Point: Home exercise program (Done)     Learning Progress Summary           Patient Acceptance, E,D, VU,DU by DP at 9/16/2022 1533    Acceptance, E, VU by WN at 9/16/2022 0123    Acceptance, E,D, VU,DU by DP at 9/15/2022 1144    Acceptance, E,D, VU,DU by DP at 9/14/2022 1143    Acceptance, E, VU,NR by MG at 9/13/2022 1143    Acceptance, E,TB, VU,NR by EE at 9/10/2022 1518                   Point: Body mechanics (Done)     Learning Progress Summary           Patient Acceptance, E,D, VU,DU by DP at 9/16/2022 1533    Acceptance, E, VU by WN at 9/16/2022 0123    Acceptance, E,D, VU,DU by DP at 9/15/2022 1144    Acceptance, E,D, VU,DU by DP at 9/14/2022 1143    Acceptance, E, VU,NR by MG at 9/13/2022 1143    Acceptance, E,TB, VU,NR by EE at 9/10/2022 1518                    Point: Precautions (Done)     Learning Progress Summary           Patient Acceptance, E,D, VU,DU by DP at 9/16/2022 1533    Acceptance, E, VU by WN at 9/16/2022 0123    Acceptance, E,D, VU,DU by DP at 9/15/2022 1144    Acceptance, E,D, VU,DU by DP at 9/14/2022 1143    Acceptance, E, VU,NR by MG at 9/13/2022 1143    Acceptance, E, VU by MD at 9/12/2022 1114    Acceptance, E,TB, VU,NR by EE at 9/10/2022 1518                               User Key     Initials Effective Dates Name Provider Type Discipline    EE 06/16/21 -  Rand Patel, PT Physical Therapist PT    MD 06/16/21 -  Ale Garza, PT Physical Therapist PT    MG 05/24/22 -  Elisabeth Bowen, PT Physical Therapist PT    WN 08/23/22 -  Sancho Eugene, RN Registered Nurse Nurse    DP 08/24/21 -  Preston Hills, PT Physical Therapist PT                PT Recommendation and Plan                          Time Calculation:      PT Charges     Row Name 09/16/22 1540 09/16/22 1533          Time Calculation    Start Time 1230  -DP 1100  -DP     Stop Time 1300  -DP 1130  -DP     Time Calculation (min) 30 min  -DP 30 min  -DP     PT Received On -- 09/16/22  -DP     PT - Next Appointment -- 09/17/22  -DP     PT Goal Re-Cert Due Date -- 09/23/22  -DP            Time Calculation- PT    Total Timed Code Minutes- PT 30 minute(s)  -DP 30 minute(s)  -DP           User Key  (r) = Recorded By, (t) = Taken By, (c) = Cosigned By    Initials Name Provider Type    DP Preston Hills, PT Physical Therapist                Therapy Charges for Today     Code Description Service Date Service Provider Modifiers Qty    31634264842 HC PT THER PROC EA 15 MIN 9/15/2022 Preston Hills, PT GP 1    55397199205 HC PT THERAPEUTIC ACT EA 15 MIN 9/15/2022 JeanaPreston arriaga, PT GP 1    86570910856 HC PT THER SUPP EA 15 MIN 9/15/2022 Preston Hills, PT GP 1    13038009842 HC PT THERAPEUTIC ACT EA 15 MIN 9/15/2022 Preston Hills, PT GP 1    10399217329 HC GAIT TRAINING EA 15 MIN 9/15/2022 Preston Hills, PT  GP 1    [id number]  PT THERAPEUTIC ACT EA 15 MIN 9/16/2022 Preston Hills, PT GP 1    73094811332 HC PT THER PROC EA 15 MIN 9/16/2022 Preston Hills, PT GP 1    90073380170 HC GAIT TRAINING EA 15 MIN 9/16/2022 Preston Hills, PT GP 2    [id number]  PT THER SUPP EA 15 MIN 9/16/2022 Preston Hills, PT GP 2              Patient was wearing a face mask during this therapy encounter. Therapist used appropriate personal protective equipment including mask and gloves.  Mask used was standard procedure mask. Appropriate PPE was worn during the entire therapy session. Hand hygiene was completed before and after therapy session. Patient is not in enhanced droplet precautions.         Preston Hills, PT  9/16/2022

## 2022-09-16 NOTE — NURSING NOTE
09/16/22 0750   Colostomy LLQ   Placement Date/Time: 09/09/22 2100   Placed by External Staff?: Other hospital  Location: LLQ   Stomal Appliance 1 piece;Changed   Stoma Appearance round;moist;pink  (located in deep skin fold)   Peristomal Assessment Clean;Intact;Pink   Accessories/Skin Care cleansed with water;skin barrier ring;wafer barrier over peristomal skin  (1-piece Coloplast KARAN pouch with barrier extenders)   Stoma Function flatus;stool   Stool Color brown   Stool Consistency soft;creamy   Treatment Bag change;Site care     CWON note: pt seen for pouch change and colostomy teaching. She has a fairly new colostomy and she is unable to remember having the surgery or anything about her stoma.  Her family is learning her ostomy care.  Today, her daughter was present and performed the pouch change with verbal instruction and some hands on assistance. She was instructed in pouch change procedure, measuring the stoma, cutting the pouch to fit the stoma, use of a barrier ring, skin care, showering with or without the pouch in place, and emptying the appliance. Will plan to see pt 2x week for teaching with family as long as they need additional teaching while she is here. Extra ostomy supplies at bedside.

## 2022-09-16 NOTE — THERAPY TREATMENT NOTE
Inpatient Rehabilitation - Speech Language Pathology Treatment Note    [facility]     Patient Name: Summer Smith  : 1950  MRN: [id number]    Today's Date: 2022                   Admit Date: 2022       Visit Dx:      ICD-10-CM ICD-9-CM   1. Follow-up exam  Z09 V67.9       Patient Active Problem List   Diagnosis   • Aneurysm (HCC)       Past Medical History:   Diagnosis Date   • COPD (chronic obstructive pulmonary disease) (HCC)    • Elevated cholesterol    • GERD (gastroesophageal reflux disease)        Past Surgical History:   Procedure Laterality Date   • CARDIAC CATHETERIZATION     • COLON SURGERY     • COLONOSCOPY     • SKIN BIOPSY         SLP Recommendation and Plan                                                   SLP EVALUATION (last 72 hours)     SLP SLC Evaluation     Row Name 22 1030 22 0900 09/15/22 1300 09/15/22 0922 1430       Communication Assessment/Intervention    Document Type therapy note (daily note)  -SR therapy note (daily note)  -SR therapy note (daily note)  -SR therapy note (daily note)  -SR therapy note (daily note)  -SR    Subjective Information no complaints  -SR no complaints  -SR no complaints  -SR no complaints  -SR no complaints  -SR    Patient Observations alert;cooperative;agree to therapy  -SR alert;cooperative;agree to therapy  -SR alert;cooperative;agree to therapy  -SR alert;cooperative;agree to therapy  -SR alert;cooperative;agree to therapy  -SR    Patient/Family/Caregiver Comments/Observations Pt up in recliner for second AM therapy session  -SR -- Pt up in recliner for PM therapy session.  -SR -- --    Patient Effort good  -SR good  -SR good  -SR good  -SR good  -SR    Symptoms Noted During/After Treatment none  -SR none  -SR none  -SR none  -SR none  -SR       Pain Scale: Numbers Pre/Post-Treatment    Pretreatment Pain Rating 0/10 - no pain  -SR 0/10 - no pain  -SR 0/10 - no pain  -SR 0/10 - no pain  -SR 8/10  -SR     Posttreatment Pain Rating 0/10 - no pain  -SR 0/10 - no pain  -SR 0/10 - no pain  -SR 0/10 - no pain  -SR 8/10  -SR    Pain Location -- -- -- -- incisional  -SR    Pain Location - -- -- -- -- abdomen  -SR    Row Name 09/14/22 0930                   Communication Assessment/Intervention    Document Type therapy note (daily note)  -SR        Subjective Information no complaints  -SR        Patient Observations alert;cooperative;agree to therapy  -SR        Patient Effort good  -SR        Symptoms Noted During/After Treatment none  -SR                  Pain Scale: Numbers Pre/Post-Treatment    Pretreatment Pain Rating 5/10  -SR        Posttreatment Pain Rating 5/10  -SR        Pain Location incisional  -SR        Pain Location - abdomen  -SR              User Key  (r) = Recorded By, (t) = Taken By, (c) = Cosigned By    Initials Name Effective Dates    SR Marlen Ochoa, RAGHU 01/12/22 -                    EDUCATION    The patient has been educated in the following areas:       Cognitive Impairment.             SLP GOALS     Row Name 09/16/22 1030 09/16/22 0900 09/15/22 1300       Attention Goal 1 (SLP)    Improve Attention by Goal 1 (SLP) attending to task;complete selective attention task;80%;independently (over 90% accuracy)  -SR -- attending to task;complete selective attention task;80%;independently (over 90% accuracy)  -SR    Time Frame (Attention Goal 1, SLP) by discharge  -SR -- by discharge  -SR    Progress (Attention Goal 1, SLP) 80%;with minimal cues (75-90%)  -SR -- 80%;with maximum cues (25-49%)  -SR    Progress/Outcomes (Attention Goal 1, SLP) good progress toward goal  -SR -- good progress toward goal  -SR    Comment (Attention Goal 1, SLP) -- -- Visual scanning/alternating attention. Patient required max assist to draw continuous line alternating from number to letter.  -SR       Memory Skills Goal 1 (SLP)    Improve Memory Skills Through Goal 1 (SLP) -- recalling unrelated word lists  immediately;recalling unrelated word lists with an imposed delay;repeat list in sequential order;listen to a paragraph and answer questions;70%;independently (over 90% accuracy)  -SR --    Time Frame (Memory Skills Goal 1, SLP) -- by discharge  -SR --    Progress/Outcomes (Memory Skills Goal 1, SLP) -- goal revised this date;good progress toward goal  -SR --    Comment (Memory Skills Goal 1, SLP) -- Patient recalled 4/4 words during immediate recall activity with 80% accuracy given no cues. During a following activity, patient recalled 3 words in sequential order with 100% accuracy given no cues.  -SR --       Functional Problem Solving Skills Goal 1 (SLP)    Improve Problem Solving Through Goal 1 (SLP) -- determine solutions to simple ADL/safety problems;determine solutions to multifactorial problems;sequence steps in a task;complete organization/home management task;70%;independently (over 90% accuracy)  -SR --    Time Frame (Problem Solving Goal 1, SLP) -- by discharge  -SR --    Progress/Outcomes (Problem Solving Goal 1, SLP) -- good progress toward goal  -SR --    Comment (Problem Solving Goal 1, SLP) -- Patient used information from a paragraph to create a daily therapy schedule with 30% acc given no cues; 80% acc given min cues. Answered math-based scenerios regarding insurance with 80% acc given no cues.  -SR --       Functional Math Skills Goal 1 (SLP)    Improve Functional Math Skills Through Goal 1 (SLP) complete word problems involving time;complete functional math task;70%;independently (over 90% accuracy)  -SR -- --    Time Frame (Functional Math Skills Goal 1, SLP) by discharge  -SR -- --    Progress/Outcomes (Functional Math Skills Goal 1, SLP) goal ongoing  -SR -- --    Comment (Functional Math Skills Goal 1, SLP) Using a restaurant menu, patient answered money-based questions with 60% acc given no cues; 100% acc given min cues. Required extended time to complete activity. She required min cues to  locate prices on visual.  -SR -- --       Executive Functional Skills Goal 1 (SLP)    Improve Executive Function Skills Goal 1 (SLP) -- -- demonstrate awareness of deficit;identify strategies, strengths, limitations;time management activity;complex organization/planning activity;home management activity;70%;independently (over 90% accuracy)  -SR    Time Frame (Executive Function Skills Goal 1, SLP) -- -- by discharge  -SR    Progress/Outcomes (Executive Function Skills Goal 1, SLP) -- -- goal ongoing  -SR    Comment (Executive Function Skills Goal 1, SLP) -- -- Using the [employer] chandni, patient provided the time on a clock given choice of 3 with 70% accuracy given no cues.  -SR       Right Hemisphere Function Goal 1 (SLP)    Improve Right Hemisphere Function Through Goal 1 (SLP) complete visuo-spatial activities (visual closure, trail making, mazes;with minimal cues (75-90%)  -SR -- complete visuo-spatial activities (visual closure, trail making, mazes;with minimal cues (75-90%)  -SR    Time Frame (Right Hemisphere Function Goal 1, SLP) by discharge  -SR -- by discharge  -SR    Progress/Outcomes (Right Hemisphere Function Goal 1, SLP) good progress toward goal  -SR -- goal ongoing  -SR    Comment (Right Hemisphere Function Goal 1, SLP) Patient answered simple questions involving time/clock reading with 80% acc given no cues.  -SR -- Patient completed clock drawing activity with 25% accuracy given max cues. During the first activity, patient wrote 13 numbers in the clock (perseverated number 12). Numbers 1-6  were evenly arranged on the right side, but numbers 6-12 were clustered on the lower left quadrant. For the second clock, patient wrote numbers 56-70 around the clock. Demonstrated decreased awareness of errors during activity.  -SR    Row Name 09/15/22 0900 09/14/22 1430 09/14/22 0930       Attention Goal 1 (SLP)    Improve Attention by Goal 1 (SLP) attending to task;complete selective attention  task;80%;independently (over 90% accuracy)  -SR -- attending to task;complete selective attention task;80%;independently (over 90% accuracy)  -SR    Time Frame (Attention Goal 1, SLP) by discharge  -SR -- by discharge  -SR    Progress/Outcomes (Attention Goal 1, SLP) good progress toward goal  -SR -- goal ongoing  -SR    Comment (Attention Goal 1, SLP) Patient followed 2-step simple written directions with 100% accuracy given no cues.  -SR -- Patient unscrambled 4-5 letter word using number/alphabet decoding key with 80% accuracy given no cues.  -SR       Functional Problem Solving Skills Goal 1 (SLP)    Improve Problem Solving Through Goal 1 (SLP) -- determine solutions to simple ADL/safety problems;determine solutions to multifactorial problems;sequence steps in a task;complete organization/home management task;70%;independently (over 90% accuracy)  -SR --    Time Frame (Problem Solving Goal 1, SLP) -- by discharge  -SR --    Progress/Outcomes (Problem Solving Goal 1, SLP) -- good progress toward goal  -SR --    Comment (Problem Solving Goal 1, SLP) -- Patient answered reasoning/logic-based questions about a shopping advertisement with 80% accuracy given min cues. During a following activity, patient created a daily schedule using details from a paragraph with 50% acc given mod cues ;100% acc given max cues.  -SR --       Functional Math Skills Goal 1 (SLP)    Improve Functional Math Skills Through Goal 1 (SLP) complete word problems involving time;complete functional math task;70%;independently (over 90% accuracy)  -SR -- complete word problems involving time;complete functional math task;70%;independently (over 90% accuracy)  -SR    Time Frame (Functional Math Skills Goal 1, SLP) by discharge  -SR -- by discharge  -SR    Progress/Outcomes (Functional Math Skills Goal 1, SLP) goal ongoing  -SR -- goal ongoing  -SR    Comment (Functional Math Skills Goal 1, SLP) Functional math and writing; Patient wrote checks  for 3 separate billing companies with 80% accuracy given no cues. Required verbal cues for today's date. During a following activity, patient calculated amount remaining in checkbook using the billing statements with 80% acc given min cues. Calculator assist.  -SR -- Money management; Patient added coin amounts with 60% accuracy given mod cues; 100% acc given min cues. Demonstrated difficulty with mental manipulation and sequencing.  -SR       Executive Functional Skills Goal 1 (SLP)    Improve Executive Function Skills Goal 1 (SLP) -- demonstrate awareness of deficit;identify strategies, strengths, limitations;time management activity;complex organization/planning activity;home management activity;70%;independently (over 90% accuracy)  -SR --    Time Frame (Executive Function Skills Goal 1, SLP) -- by discharge  -SR --    Progress/Outcomes (Executive Function Skills Goal 1, SLP) -- goal ongoing  -SR --    Comment (Executive Function Skills Goal 1, SLP) -- Patient followed verbal directions to locate room using a hospital map with 80% accuracy given no cues  -SR --          User Key  (r) = Recorded By, (t) = Taken By, (c) = Cosigned By    Initials Name Provider Type    Marlen Becerra SLP Speech and Language Pathologist                            Time Calculation:        Time Calculation- SLP     Row Name 09/16/22 1213 09/16/22 0930          Time Calculation- SLP    SLP Start Time 1030  -SR 0900  -SR     SLP Stop Time 1100  -SR 0930  -SR     SLP Time Calculation (min) 30 min  -SR 30 min  -SR           User Key  (r) = Recorded By, (t) = Taken By, (c) = Cosigned By    Initials Name Provider Type    Marlen Becerra SLP Speech and Language Pathologist                  Therapy Charges for Today     Code Description Service Date Service Provider Modifiers Qty    44947837046 HC ST DEV OF COGN SKILLS INITIAL 15 MIN 9/15/2022 Marlen Ochoa SLP  1    11295795520 HC ST DEV OF COGN SKILLS EACH ADDT'L 15 MIN 9/15/2022  Marlen Ochoa, SLP  3    [id number]  ST DEV OF COGN SKILLS INITIAL 15 MIN 9/16/2022 Marlen Ochoa, RAGHU  1    [id number]  ST DEV OF COGN SKILLS EACH ADDT'L 15 MIN 9/16/2022 Marlen Ochoa, RAGHU  3                           Marlen Ochoa, RAGHU  9/16/2022

## 2022-09-16 NOTE — THERAPY TREATMENT NOTE
Inpatient Rehabilitation - Occupational Therapy Treatment Note    [facility]     Patient Name: Summer Smith  : 1950  MRN: [id number]    Today's Date: 2022                 Admit Date: 2022         ICD-10-CM ICD-9-CM   1. Follow-up exam  Z09 V67.9       Patient Active Problem List   Diagnosis   • Aneurysm (HCC)       Past Medical History:   Diagnosis Date   • COPD (chronic obstructive pulmonary disease) (HCC)    • Elevated cholesterol    • GERD (gastroesophageal reflux disease)        Past Surgical History:   Procedure Laterality Date   • CARDIAC CATHETERIZATION     • COLON SURGERY     • COLONOSCOPY     • SKIN BIOPSY               IRF OT ASSESSMENT FLOWSHEET (last 12 hours)     IRF OT Evaluation and Treatment     Row Name 22 1201          OT Time and Intention    Document Type progress note  -DN     Mode of Treatment occupational therapy  -DN     Patient Effort good  -DN     Row Name 22 1201          Pain Assessment    Pretreatment Pain Rating 2/10  -DN     Posttreatment Pain Rating 2/10  -DN     Pain Location incisional  -DN     Pain Location - abdomen  -DN     Row Name 22 1201          Cognition/Psychosocial    Affect/Mental Status (Cognition) WFL  -DN     Orientation Status (Cognition) oriented x 3  -DN     Follows Commands (Cognition) follows one-step commands;over 90% accuracy;increased processing time needed;physical/tactile prompts required;repetition of directions required;verbal cues/prompting required  -DN     Cognitive Function memory deficit;attention deficit  -DN     Attention Deficit (Cognition) minimal deficit  -DN     Memory Deficit (Cognition) minimal deficit  -DN     Row Name 22 1201          Bathing    [place] Level (Bathing) bathing skills;moderate assist (50% patient effort);verbal cues;nonverbal cues (demo/gesture)  -DN     Assistive Device (Bathing) tub bench;grab bar/tub rail;hand held shower spray hose  -DN     Position (Bathing) supported  sitting;supported standing  -DN     Set-up Assistance (Bathing) obtain supplies  -DN     Row Name 09/16/22 1201          Upper Body Dressing    [place] Level (Upper Body Dressing) upper body dressing skills;doff;don;pull over garment;supervision;verbal cues  -DN     Position (Upper Body Dressing) supported sitting  -DN     Set-up Assistance (Upper Body Dressing) obtain clothing  -DN     Comment (Upper Body Dressing) no bra  -DN     Row Name 09/16/22 1201          Lower Body Dressing    [place] Level (Lower Body Dressing) doff;don;pants/bottoms;shoes/slippers;socks;underwear;dependent (less than 25% patient effort)  -DN     Position (Lower Body Dressing) supported sitting;supported standing  -DN     Set-up Assistance (Lower Body Dressing) obtain clothing  -DN     Comment (Lower Body Dressing) thigh high jobst stockings on  -DN     Row Name 09/16/22 1201          Grooming    [place] Level (Grooming) grooming skills;hair care, combing/brushing;oral care regimen;supervision;verbal cues  -DN     Position (Grooming) supported sitting  -DN     Set-up Assistance (Grooming) obtain supplies  -DN     Row Name 09/16/22 1201          Transfers    [place] Level (Shower Transfer) minimum assist (75% patient effort);nonverbal cues (demo/gesture);verbal cues  -DN     Assistive Device (Shower Transfer) tub bench;grab bar, tub/shower;wheelchair  -DN     Row Name 09/16/22 1201          Shower Transfer    Type (Shower Transfer) stand pivot/stand step  -DN     Row Name 09/16/22 1201          Positioning and Restraints    Pre-Treatment Position sitting in chair/recliner  -DN     Post Treatment Position wheelchair  -DN     In Bed sitting;call light within reach;encouraged to call for assist;exit alarm on  -DN           User Key  (r) = Recorded By, (t) = Taken By, (c) = Cosigned By    Initials Name Effective Dates    Sina Currie OT 06/16/21 -                          OT Recommendation and Plan                          Time Calculation:      Time Calculation- OT     Row Name 09/16/22 0800             Time Calculation- OT    OT Start Time 0800  -DN      OT Stop Time 0900  -DN      OT Time Calculation (min) 60 min  -DN            User Key  (r) = Recorded By, (t) = Taken By, (c) = Cosigned By    Initials Name Provider Type    Sina Currie OT Occupational Therapist              Therapy Charges for Today     Code Description Service Date Service Provider Modifiers Qty    64739024937 HC OT SELF CARE/MGMT/TRAIN EA 15 MIN 9/15/2022 Sina Walls OT GO 1    97131990313 HC OT THER PROC EA 15 MIN 9/15/2022 Sina Walls OT GO 1    99813927989 HC OT THER PROC EA 15 MIN 9/15/2022 Sina Walls OT GO 2    96548157454 HC OT THER PROC EA 15 MIN 9/15/2022 Sina Walls OT GO 2    61772674335 HC OT SELF CARE/MGMT/TRAIN EA 15 MIN 9/16/2022 Sina Walls OT GO 4                   Sina Walls OT  9/16/2022

## 2022-09-17 PROCEDURE — 25010000002 ENOXAPARIN PER 10 MG: Performed by: PHYSICAL MEDICINE & REHABILITATION

## 2022-09-17 PROCEDURE — 97116 GAIT TRAINING THERAPY: CPT | Performed by: PHYSICAL THERAPIST

## 2022-09-17 PROCEDURE — 97110 THERAPEUTIC EXERCISES: CPT | Performed by: PHYSICAL THERAPIST

## 2022-09-17 RX ADMIN — ENOXAPARIN SODIUM 40 MG: 100 INJECTION SUBCUTANEOUS at 20:11

## 2022-09-17 RX ADMIN — PANTOPRAZOLE SODIUM 40 MG: 40 TABLET, DELAYED RELEASE ORAL at 06:12

## 2022-09-17 RX ADMIN — AMLODIPINE BESYLATE 5 MG: 5 TABLET ORAL at 08:35

## 2022-09-17 RX ADMIN — HYDROCODONE BITARTRATE AND ACETAMINOPHEN 1 TABLET: 5; 325 TABLET ORAL at 20:13

## 2022-09-17 RX ADMIN — SERTRALINE 25 MG: 25 TABLET, FILM COATED ORAL at 08:35

## 2022-09-17 RX ADMIN — LISINOPRIL 5 MG: 5 TABLET ORAL at 08:35

## 2022-09-17 RX ADMIN — ATORVASTATIN CALCIUM 40 MG: 20 TABLET, FILM COATED ORAL at 20:11

## 2022-09-17 RX ADMIN — Medication 2000 UNITS: at 08:35

## 2022-09-17 NOTE — PLAN OF CARE
Goal Outcome Evaluation:     Slept fairly well. Family member at bedside. Meds with water. Colostomy intact. Voided per BSC with assist of 2. O2 1L/NC. Jobst stockings on & off tonight. Norco given for abd. Pain with some relief.

## 2022-09-17 NOTE — PLAN OF CARE
Goal Outcome Evaluation:  Plan of Care Reviewed With: patient        Progress: improving  Outcome Evaluation: Up in recliner today with Jobst in place. Colostomy intact. O2 2 liters this shift.

## 2022-09-17 NOTE — PROGRESS NOTES
[facility]     Progress Note    Patient Name: Summer Smith  : 1950  MRN: [id number]  Primary Care Physician:  Wale Jacob MD  Date of admission: 2022    Subjective   Subjective     Chief Complaint:  MCA aneurysm with outpatient f/up  Immobility syndrome    History of Present Illness  Patient Reports she is feeling stronger and getting better. Denies f/c/n/v/soa/cp    Review of Systems    Objective   Objective     Vitals:   Temp:  [97.5 °F (36.4 °C)-98.6 °F (37 °C)] 97.5 °F (36.4 °C)  Heart Rate:  [69-97] 82  Resp:  [18] 18  BP: (134-178)/(60-72) 134/61  Flow (L/min):  [1-2] 1    Physical Exam   Awake, alert and 0x3  NAD  Heart: Regular rate and rhythm.  No rub murmur or gallop.  Lungs: Clear to auscultation bilaterally  Abdomen: Colostomy  Normoactive bowel sounds.  Soft and nontender  Ext:[id number]  Transferring with staff from bedside to chair    Result Review    Result Review:  I have personally reviewed the results from the time of this admission to 2022 15:10 EDT and agree with these findings:  []  Laboratory list / accordion  []  Microbiology  []  Radiology  []  EKG/Telemetry   []  Cardiology/Vascular   []  Pathology  []  Old records  []  Other:  Most notable findings include:   No new labs    Scheduled Meds:amLODIPine, 5 mg, Oral, Q24H  atorvastatin, 40 mg, Oral, Nightly  cholecalciferol, 2,000 Units, Oral, Daily  enoxaparin, 40 mg, Subcutaneous, Nightly  lisinopril, 5 mg, Oral, Q24H  pantoprazole, 40 mg, Oral, Q AM  sertraline, 25 mg, Oral, Daily      Continuous Infusions:   PRN Meds:.•  acetaminophen  •  albuterol  •  HYDROcodone-acetaminophen **OR** [DISCONTINUED] HYDROcodone-acetaminophen        Assessment & Plan   Assessment / Plan     Brief Patient Summary:  Summer Smith is a 71 y.o. female     Active Hospital Problems:  Active Hospital Problems    Diagnosis    • Aneurysm (HCC)      Plan:     Immobility Syndrome  1.  MCA aneurysm- will have further work up as  outpt     2.  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 Will teach family colostomy care. Pain controlled. Encouraged to at least take pain meds prior to therapy     3.  HTN- stable on norvasc, lisinopril. Running low.  Decreased lisinopril to 5 mg.   September 16-blood pressure 128-131/60-63     4.  Depression - low dose zoloft     5.  DVT proph- was on lovenox-Will continue 40 mg daily for now     6. Pulm - nasal cannula O2. Wean, keep sats > 90%  September 13-Taper down to 2 L  September 16-sats 92% on room air     7. Right lateral elbow laceration - August 31 - sutured  September 16-as incisions located over a mobile joint, will continue sutures until Monday, September 19.     8.  Orthostatic hypotension-she has bilateral lower extremity edema but will hold on adding diuretic given her orthostatic hypotension.  We will try thigh-high Jobst stockings 15-20 mm compression for both orthostasis as well as edema.  Echocardiogram recently showed ejection fraction 65%.  Recheck CBC and chemistries in a.m.    9/17/22 -   Discussed code status with patient to document in chart, and she desires Full code. Order placed in chart. Daughter in room during conversation.      DVT prophylaxis:  Medical and mechanical DVT prophylaxis orders are present.    CODE STATUS:    Level Of Support Discussed With: Patient; Next of Kin (If No Surrogate)  Code Status (Patient has no pulse and is not breathing): CPR (Attempt to Resuscitate)  Medical Interventions (Patient has pulse or is breathing): Full Support  Comments: discussed with patient and daughter in room  Release to patient: Routine Release      Elisabeth King MD

## 2022-09-17 NOTE — THERAPY TREATMENT NOTE
Inpatient Rehabilitation - Physical Therapy Treatment Note       [facility]     Patient Name: Summer Smith  : 1950  MRN: [id number]    Today's Date: 2022                    Admit Date: 2022      Visit Dx:     ICD-10-CM ICD-9-CM   1. Follow-up exam  Z09 V67.9       Patient Active Problem List   Diagnosis   • Aneurysm (HCC)       Past Medical History:   Diagnosis Date   • COPD (chronic obstructive pulmonary disease) (HCC)    • Elevated cholesterol    • GERD (gastroesophageal reflux disease)        Past Surgical History:   Procedure Laterality Date   • CARDIAC CATHETERIZATION     • COLON SURGERY     • COLONOSCOPY     • SKIN BIOPSY         PT ASSESSMENT (last 12 hours)     IRF PT Evaluation and Treatment     Row Name 22          PT Time and Intention    Document Type daily treatment  -JS     Mode of Treatment physical therapy  -JS     Patient/Family/Caregiver Comments/Observations Agreeable to PT  -     Row Name 22          General Information    Patient Profile Reviewed yes  -JS     General Observations of Patient Seated on EOB upon arrival  -JS     Existing Precautions/Restrictions fall;oxygen therapy device and L/min;other (see comments)  colostomy  -JS     Row Name 22          Pain Assessment    Pretreatment Pain Rating 0/10 - no pain  -JS     Posttreatment Pain Rating 0/10 - no pain  -JS     Row Name 22          Cognition/Psychosocial    Affect/Mental Status (Cognition) WFL  -     Orientation Status (Cognition) oriented x 3  -JS     Follows Commands (Cognition) follows one-step commands;over 90% accuracy;increased processing time needed;physical/tactile prompts required;repetition of directions required;verbal cues/prompting required  -JS     Personal Safety Interventions fall prevention program maintained;gait belt;muscle strengthening facilitated;nonskid shoes/slippers when out of bed  -JS     Row Name 22          Bed Mobility     Comment, (Bed Mobility) Up in w/c  -     Row Name 09/17/22 0915          Transfers    Bed-Chair [place] (Transfers) verbal cues;minimum assist (75% patient effort)  -     Assistive Device (Bed-Chair Transfers) wheelchair  -     Sit-Stand [place] (Transfers) minimum assist (75% patient effort)  -     Stand-Sit [place] (Transfers) verbal cues;minimum assist (75% patient effort)  -     [place] Level (Toilet Transfer) moderate assist (50% patient effort);verbal cues;nonverbal cues (demo/gesture)  -     Assistive Device (Toilet Transfer) raised toilet seat;grab bars/safety frame;wheelchair  -     Row Name 09/17/22 0915          Sit-Stand Transfer    Assistive Device (Sit-Stand Transfers) wheelchair;walker, front-wheeled  -     Comment, (Sit-Stand Transfer) 5x sit to stand from w/c with hemibar in front with min A and cueing  -     Row Name 09/17/22 0915          Stand-Sit Transfer    Assistive Device (Stand-Sit Transfers) wheelchair;walker, front-wheeled  -     Row Name 09/17/22 0915          Toilet Transfer    Type (Toilet Transfer) stand pivot/stand step  -     Row Name 09/17/22 0915          Gait/Stairs (Locomotion)    [place] Level (Gait) contact guard  -     Assistive Device (Gait) walker, front-wheeled  -     Distance in Feet (Gait) 40'x1, 50'x1  -JS     Pattern (Gait) step-through;step-to  -JS     Deviations/Abnormal Patterns (Gait) base of support, narrow;festinating/shuffling;hunter decreased;gait speed decreased;stride length decreased  -     Bilateral Gait Deviations forward flexed posture;foot drop/toe drag  -     Row Name 09/17/22 0915          Balance    Comment, Balance Step tap to blue foam with UE assistance on hemibars  -     Row Name 09/17/22 0915          Hip (Therapeutic Exercise)    Hip AROM (Therapeutic Exercise) bilateral;flexion;sitting;10 repetitions  -     Hip Strengthening (Therapeutic Exercise)  bilateral;aBduction;sitting;red;resistance band;10 repetitions  -     Row Name 09/17/22 0915          Knee (Therapeutic Exercise)    Knee Strengthening (Therapeutic Exercise) bilateral;hamstring curls;resistance band;red;LAQ (long arc quad);sitting;10 repetitions  -     Row Name 09/17/22 0915          Ankle (Therapeutic Exercise)    Ankle AROM (Therapeutic Exercise) bilateral;dorsiflexion;plantarflexion;sitting;10 repetitions  -     Row Name 09/17/22 0915          Positioning and Restraints    Pre-Treatment Position sitting in chair/recliner  -JS     Bathroom sitting;call light within reach;with nsg  -     Row Name 09/17/22 0915          Vital Signs    Pre SpO2 (%) 100  -JS     O2 Delivery Pre Treatment supplemental O2  -JS     Intra SpO2 (%) --  95% after amb with 1L O2, 92% after amb with .5L O2  -JS     O2 Delivery Intra Treatment supplemental O2  -JS     Post SpO2 (%) 95  -JS     O2 Delivery Post Treatment supplemental O2  -JS     Pre Patient Position Sitting  -JS     Intra Patient Position Sitting  immediately after ambulation  -JS     Post Patient Position Sitting  -JS           User Key  (r) = Recorded By, (t) = Taken By, (c) = Cosigned By    Initials Name Provider Type    JS Stella Lane, PT Physical Therapist              Wound 09/09/22 2200 Right upper arm Laceration (Active)   Dressing Appearance dry;intact 09/17/22 0800   Closure Sutures 09/17/22 0800   Base dressing in place, unable to visualize 09/16/22 1947   Drainage Amount none 09/16/22 1947   Care, Wound sterile normal saline 09/17/22 0800   Dressing Care dressing changed 09/17/22 0800       Wound 09/09/22 2200 Left midline abdomen Incision (Active)   Dressing Appearance open to air 09/17/22 0800   Closure Approximated 09/17/22 0800   Drainage Amount none 09/16/22 1947   Care, Wound cleansed with;sterile normal saline 09/17/22 0800   Dressing Care open to air 09/17/22 0800     Physical Therapy Education                 Title: PT OT SLP  Therapies (Done)     Topic: Physical Therapy (Done)     Point: Mobility training (Done)     Learning Progress Summary           Patient Acceptance, E,TB,D, VU,DU by YARED at 9/17/2022 1450    Acceptance, E,D, VU,DU by DP at 9/16/2022 1533    Acceptance, E, VU by WN at 9/16/2022 0123    Acceptance, E,D, VU,DU by DP at 9/15/2022 1144    Acceptance, E,D, VU,DU by DP at 9/14/2022 1143    Acceptance, E, VU,NR by MG at 9/13/2022 1143    Acceptance, E,TB, VU,NR by EE at 9/10/2022 1518                   Point: Home exercise program (Done)     Learning Progress Summary           Patient Acceptance, E,TB,D, VU,DU by YARED at 9/17/2022 1450    Acceptance, E,D, VU,DU by DP at 9/16/2022 1533    Acceptance, E, VU by WN at 9/16/2022 0123    Acceptance, E,D, VU,DU by DP at 9/15/2022 1144    Acceptance, E,D, VU,DU by DP at 9/14/2022 1143    Acceptance, E, VU,NR by MG at 9/13/2022 1143    Acceptance, E,TB, VU,NR by EE at 9/10/2022 1518                   Point: Body mechanics (Done)     Learning Progress Summary           Patient Acceptance, E,TB,D, VU,DU by YARED at 9/17/2022 1450    Acceptance, E,D, VU,DU by DP at 9/16/2022 1533    Acceptance, E, VU by WN at 9/16/2022 0123    Acceptance, E,D, VU,DU by DP at 9/15/2022 1144    Acceptance, E,D, VU,DU by DP at 9/14/2022 1143    Acceptance, E, VU,NR by MG at 9/13/2022 1143    Acceptance, E,TB, VU,NR by EE at 9/10/2022 1518                   Point: Precautions (Done)     Learning Progress Summary           Patient Acceptance, E,TB,D, VU,DU by YARED at 9/17/2022 1450    Acceptance, E,D, VU,DU by DP at 9/16/2022 1533    Acceptance, E, VU by WN at 9/16/2022 0123    Acceptance, E,D, VU,DU by DP at 9/15/2022 1144    Acceptance, E,D, VU,DU by DP at 9/14/2022 1143    Acceptance, E, VU,NR by MG at 9/13/2022 1143    Acceptance, E, VU by MD at 9/12/2022 1114    Acceptance, E,TB, VU,NR by EE at 9/10/2022 1518                               User Key     Initials Effective Dates Name Provider Type Discipline    JS  06/16/21 -  Stella Lane, PT Physical Therapist PT    EE 06/16/21 -  Rand Patel, PT Physical Therapist PT    MD 06/16/21 -  Ale Garza, PT Physical Therapist PT    MG 05/24/22 -  Elisabeth Bowen, PT Physical Therapist PT    WN 08/23/22 -  Sancho Eugene, RN Registered Nurse Nurse    DP 08/24/21 -  [place], PT Physical Therapist PT                PT Recommendation and Plan             Patient was wearing a face mask during this therapy encounter. Therapist used appropriate personal protective equipment including mask and gloves.  Mask used was standard procedure mask. Appropriate PPE was worn during the entire therapy session. Hand hygiene was completed before and after therapy session. Patient is not in enhanced droplet precautions.                Time Calculation:      PT Charges     Row Name 09/17/22 0915             Time Calculation    Start Time 0915  -JS      Stop Time 0950  -JS      Time Calculation (min) 35 min  -JS      PT Received On 09/17/22  -JS      PT - Next Appointment 09/19/22  -JS            User Key  (r) = Recorded By, (t) = Taken By, (c) = Cosigned By    Initials Name Provider Type    JS Stella Lane, PT Physical Therapist                Therapy Charges for Today     Code Description Service Date Service Provider Modifiers Qty    81832941639 HC GAIT TRAINING EA 15 MIN 9/17/2022 Stella Lane, PT GP 1    51118303100 HC PT THER PROC EA 15 MIN 9/17/2022 Stella Lane, PT GP 1                   Stella Lane PT  9/17/2022

## 2022-09-18 PROCEDURE — 25010000002 ENOXAPARIN PER 10 MG: Performed by: PHYSICAL MEDICINE & REHABILITATION

## 2022-09-18 RX ADMIN — ATORVASTATIN CALCIUM 40 MG: 20 TABLET, FILM COATED ORAL at 19:55

## 2022-09-18 RX ADMIN — HYDROCODONE BITARTRATE AND ACETAMINOPHEN 1 TABLET: 5; 325 TABLET ORAL at 19:55

## 2022-09-18 RX ADMIN — AMLODIPINE BESYLATE 5 MG: 5 TABLET ORAL at 10:04

## 2022-09-18 RX ADMIN — ENOXAPARIN SODIUM 40 MG: 100 INJECTION SUBCUTANEOUS at 19:56

## 2022-09-18 RX ADMIN — SERTRALINE 25 MG: 25 TABLET, FILM COATED ORAL at 10:04

## 2022-09-18 RX ADMIN — Medication 2000 UNITS: at 10:04

## 2022-09-18 RX ADMIN — PANTOPRAZOLE SODIUM 40 MG: 40 TABLET, DELAYED RELEASE ORAL at 05:52

## 2022-09-18 RX ADMIN — LISINOPRIL 5 MG: 5 TABLET ORAL at 10:04

## 2022-09-18 NOTE — PLAN OF CARE
Goal Outcome Evaluation:  Plan of Care Reviewed With: patient        Progress: improving  Outcome Evaluation: Ms. Smith is A&Ox3, cooperative and pleasant.  Incision clean and intact to abd. Colostomy intact. BLE edema, legs elevated has JOBst stockings. Prefers to stay in recliner over bed. RA 93%, Contient of bowel and bladder using BSC SPSx1 assist.  and other family and friends visited today. No c/o pain and No issues this shift.

## 2022-09-18 NOTE — PROGRESS NOTES
Physical Medicine and Rehabilitation Progress Note    Assessment     Aneurysm (HCC) [I72.9]    Subjective     Summer Smith is a 71 y.o. female admitted to inpatient rehabilitation for MCA aneurysm with outpatient f/up  Immobility syndrome. Chart, overnight events reviewed w/nursing. No complaint this morning. Denies sob, no cp, no fevers and or chills.     The patient's medical records have been reviewed.    Objective     Wt Readings from Last 3 Encounters:   09/09/22 115 kg (252 lb 10.4 oz)     Temp Readings from Last 3 Encounters:   09/18/22 97.7 °F (36.5 °C) (Oral)     BP Readings from Last 3 Encounters:   09/18/22 133/60     Pulse Readings from Last 3 Encounters:   09/18/22 68       Physical Exam:   Gen: Pleasant, full sentences, NAD. Eating lunch.   Pulm: CTAB; no r/r/w; non-labored   Heart; RRR; no m/r/g appreciated   Abd: soft; NT/ND; +BS, colostomy.    Neuro:  verbal; appropriate   Skin: no rashes on exposed skin   : no Indwelling catheter     CT Outside Films  This procedure was auto-finalized with no dictation required.      Labs  BMP:   Results from last 7 days   Lab Units 09/15/22  0552   SODIUM mmol/L 142   POTASSIUM mmol/L 3.8   CHLORIDE mmol/L 103   CO2 mmol/L 33.0*   BUN mg/dL 9   CREATININE mg/dL 0.44*   CALCIUM mg/dL 8.8   GLUCOSE mg/dL 91     CBC:  Results from last 7 days   Lab Units 09/15/22  0552   WBC 10*3/mm3 5.91   HEMOGLOBIN g/dL 9.0*   HEMATOCRIT % 28.6*   PLATELETS 10*3/mm3 326     UA: No results found for: URINECX    PTT:    Coagulation:               Scheduled Meds:amLODIPine, 5 mg, Oral, Q24H  atorvastatin, 40 mg, Oral, Nightly  cholecalciferol, 2,000 Units, Oral, Daily  enoxaparin, 40 mg, Subcutaneous, Nightly  lisinopril, 5 mg, Oral, Q24H  pantoprazole, 40 mg, Oral, Q AM  sertraline, 25 mg, Oral, Daily      Continuous Infusions:   PRN Meds:.•  acetaminophen  •  albuterol  •  HYDROcodone-acetaminophen **OR** [DISCONTINUED]  HYDROcodone-acetaminophen    Assessment and Plan:  Active Hospital Problems    Diagnosis    • Aneurysm (HCC)      9/18/22  Spent 15 min coordinating care.   Remains stable.   Continues to meet criteria for IRF.   Chart reviewed.  Continue therapies  Medical management and education  Ongoing active work on discharge planning

## 2022-09-18 NOTE — PROGRESS NOTES
Inpatient Rehabilitation Plan of Care Note    Plan of Care  Care Plan Reviewed - No updates at this time.    Pain    Performed Intervention(s)  Pain medication as requested and available  Repositioning and wound care      Sphincter Control    Performed Intervention(s)  Incontinance products and elo care  answer call light promptly  WCON consult  skin/stoma assessment      Psychosocial    Performed Intervention(s)  supportive family  Pt given opportunity to express concerns/feelings      Safety    Performed Intervention(s)  Personal items and call light w/in reach  falls precaution  hourly rounding    Signed by: Shabbir Velarde RN

## 2022-09-18 NOTE — PLAN OF CARE
Goal Outcome Evaluation:           Progress: improving  Outcome Evaluation: Mrs. Smith is alert and oriented pleasant and cooperative, incision clean and intact to abdomen, colostomy intact was changed twice by previous RN due to leaking today, BLE edema JObst stockings worn today, 02@ 2L per n/c,  at bedside, incison to right elbow clean and intact with sutures mepilex applied for protection, continent of bowel and bladder,  at bedside all day and staying the night.

## 2022-09-19 LAB
ANION GAP SERPL CALCULATED.3IONS-SCNC: 10.7 MMOL/L (ref 5–15)
BASOPHILS # BLD AUTO: 0.05 10*3/MM3 (ref 0–0.2)
BASOPHILS NFR BLD AUTO: 0.7 % (ref 0–1.5)
BUN SERPL-MCNC: 10 MG/DL (ref 8–23)
BUN/CREAT SERPL: 20.4 (ref 7–25)
CALCIUM SPEC-SCNC: 9 MG/DL (ref 8.6–10.5)
CHLORIDE SERPL-SCNC: 105 MMOL/L (ref 98–107)
CO2 SERPL-SCNC: 25.3 MMOL/L (ref 22–29)
CREAT SERPL-MCNC: 0.49 MG/DL (ref 0.57–1)
DEPRECATED RDW RBC AUTO: 45.6 FL (ref 37–54)
EGFRCR SERPLBLD CKD-EPI 2021: 101.5 ML/MIN/1.73
EOSINOPHIL # BLD AUTO: 0.15 10*3/MM3 (ref 0–0.4)
EOSINOPHIL NFR BLD AUTO: 2.1 % (ref 0.3–6.2)
ERYTHROCYTE [DISTWIDTH] IN BLOOD BY AUTOMATED COUNT: 13.6 % (ref 12.3–15.4)
GLUCOSE SERPL-MCNC: 103 MG/DL (ref 65–99)
HCT VFR BLD AUTO: 28.8 % (ref 34–46.6)
HGB BLD-MCNC: 9.2 G/DL (ref 12–15.9)
IMM GRANULOCYTES # BLD AUTO: 0.06 10*3/MM3 (ref 0–0.05)
IMM GRANULOCYTES NFR BLD AUTO: 0.8 % (ref 0–0.5)
LYMPHOCYTES # BLD AUTO: 1.49 10*3/MM3 (ref 0.7–3.1)
LYMPHOCYTES NFR BLD AUTO: 20.6 % (ref 19.6–45.3)
MCH RBC QN AUTO: 29.7 PG (ref 26.6–33)
MCHC RBC AUTO-ENTMCNC: 31.9 G/DL (ref 31.5–35.7)
MCV RBC AUTO: 92.9 FL (ref 79–97)
MONOCYTES # BLD AUTO: 0.83 10*3/MM3 (ref 0.1–0.9)
MONOCYTES NFR BLD AUTO: 11.5 % (ref 5–12)
NEUTROPHILS NFR BLD AUTO: 4.64 10*3/MM3 (ref 1.7–7)
NEUTROPHILS NFR BLD AUTO: 64.3 % (ref 42.7–76)
NRBC BLD AUTO-RTO: 0 /100 WBC (ref 0–0.2)
PLATELET # BLD AUTO: 221 10*3/MM3 (ref 140–450)
PMV BLD AUTO: 11.5 FL (ref 6–12)
POTASSIUM SERPL-SCNC: 4.5 MMOL/L (ref 3.5–5.2)
RBC # BLD AUTO: 3.1 10*6/MM3 (ref 3.77–5.28)
SODIUM SERPL-SCNC: 141 MMOL/L (ref 136–145)
WBC NRBC COR # BLD: 7.22 10*3/MM3 (ref 3.4–10.8)

## 2022-09-19 PROCEDURE — 97130 THER IVNTJ EA ADDL 15 MIN: CPT

## 2022-09-19 PROCEDURE — 97535 SELF CARE MNGMENT TRAINING: CPT

## 2022-09-19 PROCEDURE — 85025 COMPLETE CBC W/AUTO DIFF WBC: CPT | Performed by: PHYSICAL MEDICINE & REHABILITATION

## 2022-09-19 PROCEDURE — 25010000002 ENOXAPARIN PER 10 MG: Performed by: PHYSICAL MEDICINE & REHABILITATION

## 2022-09-19 PROCEDURE — 97129 THER IVNTJ 1ST 15 MIN: CPT

## 2022-09-19 PROCEDURE — 97530 THERAPEUTIC ACTIVITIES: CPT

## 2022-09-19 PROCEDURE — 97110 THERAPEUTIC EXERCISES: CPT

## 2022-09-19 PROCEDURE — 80048 BASIC METABOLIC PNL TOTAL CA: CPT | Performed by: PHYSICAL MEDICINE & REHABILITATION

## 2022-09-19 RX ADMIN — AMLODIPINE BESYLATE 5 MG: 5 TABLET ORAL at 09:43

## 2022-09-19 RX ADMIN — ATORVASTATIN CALCIUM 40 MG: 20 TABLET, FILM COATED ORAL at 19:59

## 2022-09-19 RX ADMIN — SERTRALINE 25 MG: 25 TABLET, FILM COATED ORAL at 07:42

## 2022-09-19 RX ADMIN — PANTOPRAZOLE SODIUM 40 MG: 40 TABLET, DELAYED RELEASE ORAL at 05:59

## 2022-09-19 RX ADMIN — LISINOPRIL 5 MG: 5 TABLET ORAL at 09:42

## 2022-09-19 RX ADMIN — Medication 2000 UNITS: at 07:42

## 2022-09-19 RX ADMIN — ENOXAPARIN SODIUM 40 MG: 100 INJECTION SUBCUTANEOUS at 19:59

## 2022-09-19 NOTE — PROGRESS NOTES
Inpatient Rehabilitation Plan of Care Note    Plan of Care  Updated Problems/Interventions  Medical Problem(s)    BNE (Active)  Att'n. - WNL  Exec. Fx. - Mildly Imp.  Rsng/Jgmnt - WNL  Arith - Mod. Imp.  Visuospatial Skills - Mildly Imp.  Visual Mem. - MIldly Imp.  Verbal Mem. - Mod. Imp., improved with cues  Emot - Pt reported improved mood    Signed by: Jan Lenz Psy.d

## 2022-09-19 NOTE — PLAN OF CARE
Goal Outcome Evaluation:      A&Ox4.Pleasant and cooperative. Daughter at bedside. Pt prefers to sleep in recliner. Incision clean and intact. Right upper forearm with mepilex intact. Cont bladder, using BSC. Colostomy. Assist x1 with transfers. Meds whole with water. Jobst stockings during day, ACE wrap at night for BLE edema. No c/o pain. No unsafe behavior. Sleeping well this shift.

## 2022-09-19 NOTE — PROGRESS NOTES
Inpatient Rehabilitation Plan of Care Note    Plan of Care  Updated Problems/Interventions  Cognition    [ST] Executive Functions(Active)  Current Status(09/19/2022): Mild-moderate cognitive impairment characterized by  difficulty with visuospatial skills, attention, mental manipulation,  organization, and executive functioning skills (planning,deficit awareness).  Weekly Goal(09/26/2022): Pt will use call light appropriately; recall details of  the day; participate in structured therapy activities given min-mod cues  Discharge Goal: Functional executive functioning skills for home        Swallow Function    [ST] Swallowing(Active)  Current Status(09/19/2022): Mechanical soft / thin liquids; medication whole  with thin liquid  Weekly Goal(09/26/2022): Patient will tolerate least restrictive diet with no  overt s/sx of aspiration or penetration.  Discharge Goal: Patient will tolerate least restrictive diet with no overt s/sx  of aspiration or penetration.    Signed by: Marlen Ochoa, SLP

## 2022-09-19 NOTE — THERAPY TREATMENT NOTE
Inpatient Rehabilitation - Physical Therapy Treatment Note       [facility]     Patient Name: Summer Smith  : 1950  MRN: [id number]    Today's Date: 2022                    Admit Date: 2022      Visit Dx:     ICD-10-CM ICD-9-CM   1. Follow-up exam  Z09 V67.9       Patient Active Problem List   Diagnosis   • Aneurysm (HCC)       Past Medical History:   Diagnosis Date   • COPD (chronic obstructive pulmonary disease) (HCC)    • Elevated cholesterol    • GERD (gastroesophageal reflux disease)        Past Surgical History:   Procedure Laterality Date   • CARDIAC CATHETERIZATION     • COLON SURGERY     • COLONOSCOPY     • SKIN BIOPSY         PT ASSESSMENT (last 12 hours)     IRF PT Evaluation and Treatment     Row Name 22 0825          PT Time and Intention    Document Type daily treatment  -DP     Mode of Treatment individual therapy;physical therapy  -DP     Patient/Family/Caregiver Comments/Observations Pt reclined in recliner and agreeable to PT  -DP     Row Name 22 0825          General Information    Patient Profile Reviewed yes  -DP     Existing Precautions/Restrictions fall;oxygen therapy device and L/min;other (see comments)  colostomy  -DP     Row Name 22 0825          Pain Assessment    Pretreatment Pain Rating 0/10 - no pain  -DP     Posttreatment Pain Rating 0/10 - no pain  -DP     Row Name 22 0825          Cognition/Psychosocial    Affect/Mental Status (Cognition) WFL  -DP     Orientation Status (Cognition) oriented x 3  -DP     Follows Commands (Cognition) follows one-step commands;over 90% accuracy;increased processing time needed;physical/tactile prompts required;repetition of directions required;verbal cues/prompting required  -DP     Personal Safety Interventions safety round/check completed;elopement precautions initiated;fall prevention program maintained;gait belt;muscle strengthening facilitated;nonskid shoes/slippers when out of bed;supervised  activity  -DP     Row Name 09/19/22 0825          Transfer Assessment/Treatment    Transfers sit-stand transfer;stand-sit transfer;stand pivot/stand step transfer;car transfer  -DP     Comment, (Transfers) SBA for most transfers but at the end of session pt was CGA and required VC for hand placement  -DP     Row Name 09/19/22 0825          Transfers    Sit-Stand [place] (Transfers) standby assist;contact guard;verbal cues  -DP     Stand-Sit [place] (Transfers) verbal cues;minimum assist (75% patient effort)  -DP     Row Name 09/19/22 0825          Stand-Sit Transfer    Assistive Device (Stand-Sit Transfers) wheelchair;walker, front-wheeled  -DP     Row Name 09/19/22 0825          Stand Pivot/Stand Step Transfer    Stand Pivot/Stand Step [place] (Transfers) standby assist  -DP     Assistive Device (Stand Pivot Stand Step Transfer) wheelchair  -DP     Row Name 09/19/22 0825          Car Transfer    Type (Car Transfer) sit-stand;stand-sit  -DP     [place] Level (Car Transfer) moderate assist (50% patient effort)  -DP     Assistive Device (Car Transfer) walker, front-wheeled  -DP     Comment, (Car Transfer) Xin to stand from car but mod for LE lifing assistance  -DP     Row Name 09/19/22 0825          Gait/Stairs (Locomotion)    [place] Level (Gait) contact guard  -DP     Assistive Device (Gait) walker, front-wheeled  -DP     Distance in Feet (Gait) 80'x1, 40'x2  -DP     Pattern (Gait) step-through;step-to  -DP     Deviations/Abnormal Patterns (Gait) base of support, narrow;festinating/shuffling;hunter decreased;gait speed decreased;stride length decreased  -DP     Bilateral Gait Deviations forward flexed posture;foot drop/toe drag  -DP     Comment, (Gait/Stairs) Pt ambulated 80 feet for the firs time and required extra time to complete. Pt on 0.5L NC staying at 94L SpO2.  -DP     Row Name 09/19/22 0825          Safety Issues, Functional Mobility    Impairments Affecting Function (Mobility)  balance;cognition;endurance/activity tolerance;pain;postural/trunk control;strength  -DP     Row Name 09/19/22 0825          Motor Skills    Therapeutic Exercise hip;knee  -DP     Row Name 09/19/22 0825          Hip (Therapeutic Exercise)    Hip (Therapeutic Exercise) AROM (active range of motion)  -DP     Hip AROM (Therapeutic Exercise) bilateral;flexion;10 repetitions;sitting  -DP     Hip Isometrics (Therapeutic Exercise) bilateral;gluteal sets;10 repetitions;3 second hold  -DP     Hip Strengthening (Therapeutic Exercise) bilateral;aBduction;sitting;red;resistance band;10 repetitions;2 sets  -DP     Row Name 09/19/22 0825          Knee (Therapeutic Exercise)    Knee (Therapeutic Exercise) strengthening exercise  -DP     Knee AROM (Therapeutic Exercise) bilateral;LAQ (long arc quad);10 repetitions;2 sets  -DP     Knee Strengthening (Therapeutic Exercise) bilateral;hamstring curls;resistance band;red;10 repetitions  -DP     Row Name 09/19/22 0825          Positioning and Restraints    Pre-Treatment Position sitting in chair/recliner  -DP     Post Treatment Position chair  -DP     In Chair sitting;call light within reach;encouraged to call for assist;exit alarm on;with family/caregiver  -DP           User Key  (r) = Recorded By, (t) = Taken By, (c) = Cosigned By    Initials Name Provider Type    DP Preston Hills, PT Physical Therapist              Wound 09/09/22 2200 Right upper arm Laceration (Active)   Dressing Appearance dry;intact 09/19/22 0800   Closure Sutures 09/19/22 0800   Base dressing in place, unable to visualize 09/19/22 0800   Drainage Amount none 09/19/22 0800   Dressing Care foam;dressing changed 09/19/22 0800       Wound 09/09/22 2200 Left midline abdomen Incision (Active)   Dressing Appearance open to air 09/19/22 0800   Closure Approximated 09/19/22 0800   Base clean;closed/resurfaced 09/19/22 0800   Drainage Amount none 09/19/22 0800   Care, Wound cleansed with;soap and water 09/19/22 0800    Dressing Care open to air 09/19/22 0800     Physical Therapy Education                 Title: PT OT SLP Therapies (Done)     Topic: Physical Therapy (Done)     Point: Mobility training (Done)     Learning Progress Summary           Patient Acceptance, E,D, VU,DU by DP at 9/19/2022 1212    Acceptance, E,TB,D, VU,DU by JS at 9/17/2022 1450    Acceptance, E,D, VU,DU by DP at 9/16/2022 1533    Acceptance, E, VU by WN at 9/16/2022 0123    Acceptance, E,D, VU,DU by DP at 9/15/2022 1144    Acceptance, E,D, VU,DU by DP at 9/14/2022 1143    Acceptance, E, VU,NR by MG at 9/13/2022 1143    Acceptance, E,TB, VU,NR by EE at 9/10/2022 1518                   Point: Home exercise program (Done)     Learning Progress Summary           Patient Acceptance, E,D, VU,DU by DP at 9/19/2022 1212    Acceptance, E,TB,D, VU,DU by JS at 9/17/2022 1450    Acceptance, E,D, VU,DU by DP at 9/16/2022 1533    Acceptance, E, VU by WN at 9/16/2022 0123    Acceptance, E,D, VU,DU by DP at 9/15/2022 1144    Acceptance, E,D, VU,DU by DP at 9/14/2022 1143    Acceptance, E, VU,NR by MG at 9/13/2022 1143    Acceptance, E,TB, VU,NR by EE at 9/10/2022 1518                   Point: Body mechanics (Done)     Learning Progress Summary           Patient Acceptance, E,D, VU,DU by DP at 9/19/2022 1212    Acceptance, E,TB,D, VU,DU by YARED at 9/17/2022 1450    Acceptance, E,D, VU,DU by DP at 9/16/2022 1533    Acceptance, E, VU by WN at 9/16/2022 0123    Acceptance, E,D, VU,DU by DP at 9/15/2022 1144    Acceptance, E,D, VU,DU by DP at 9/14/2022 1143    Acceptance, E, VU,NR by MG at 9/13/2022 1143    Acceptance, E,TB, VU,NR by EE at 9/10/2022 1518                   Point: Precautions (Done)     Learning Progress Summary           Patient Acceptance, E,D, VU,DU by DP at 9/19/2022 1212    Acceptance, E,TB,D, VU,DU by JS at 9/17/2022 1450    Acceptance, E,D, VU,DU by DP at 9/16/2022 1533    Acceptance, E, VU by WN at 9/16/2022 0123    Acceptance, E,D, VU,DU by DP at  9/15/2022 1144    Acceptance, E,D, VU,DU by DP at 9/14/2022 1143    Acceptance, E, VU,NR by MG at 9/13/2022 1143    Acceptance, E, VU by MD at 9/12/2022 1114    Acceptance, E,TB, VU,NR by EE at 9/10/2022 1518                               User Key     Initials Effective Dates Name Provider Type Discipline    JS 06/16/21 -  Stella Lane, PT Physical Therapist PT    EE 06/16/21 -  Rand Patel, PT Physical Therapist PT    MD 06/16/21 -  Ale Garza, PT Physical Therapist PT    MG 05/24/22 -  Elisabeth Bowen, PT Physical Therapist PT    WN 08/23/22 -  Sancho Eugene, RN Registered Nurse Nurse    DP 08/24/21 -  Preston Hills, PT Physical Therapist PT                PT Recommendation and Plan                          Time Calculation:      PT Charges     Row Name 09/19/22 1213 09/19/22 1212          Time Calculation    Start Time 1230  -DP 1100  -DP     Stop Time 1300  -DP 1130  -DP     Time Calculation (min) 30 min  -DP 30 min  -DP     PT Received On -- 09/19/22  -DP     PT - Next Appointment -- 09/20/22  -DP            Time Calculation- PT    Total Timed Code Minutes- PT 30 minute(s)  -DP 30 minute(s)  -DP           User Key  (r) = Recorded By, (t) = Taken By, (c) = Cosigned By    Initials Name Provider Type    DP [place], PT Physical Therapist                Therapy Charges for Today     Code Description Service Date Service Provider Modifiers Qty    55814831404 HC PT THERAPEUTIC ACT EA 15 MIN 9/19/2022 JeanaLety arriagan, PT GP 1    91462133817 HC PT THER PROC EA 15 MIN 9/19/2022 JeanaBaljinderPreston, PT GP 1    80399472200 HC PT THER PROC EA 15 MIN 9/19/2022 JeanaPreston arriaga, PT GP 1    77872409551 HC PT THERAPEUTIC ACT EA 15 MIN 9/19/2022 JeanaPreston arriaga, PT GP 1              Patient was wearing a face mask during this therapy encounter. Therapist used appropriate personal protective equipment including mask and gloves.  Mask used was standard procedure mask. Appropriate PPE was worn during the entire therapy session.  Hand hygiene was completed before and after therapy session. Patient is not in enhanced droplet precautions.         Preston Hills, PT  9/19/2022

## 2022-09-19 NOTE — THERAPY TREATMENT NOTE
Inpatient Rehabilitation - Occupational Therapy Progress Note    [facility]     Patient Name: Summer Smith  : 1950  MRN: [id number]    Today's Date: 2022                 Admit Date: 2022         ICD-10-CM ICD-9-CM   1. Follow-up exam  Z09 V67.9       Patient Active Problem List   Diagnosis   • Aneurysm (HCC)       Past Medical History:   Diagnosis Date   • COPD (chronic obstructive pulmonary disease) (HCC)    • Elevated cholesterol    • GERD (gastroesophageal reflux disease)        Past Surgical History:   Procedure Laterality Date   • CARDIAC CATHETERIZATION     • COLON SURGERY     • COLONOSCOPY     • SKIN BIOPSY               IRF OT ASSESSMENT FLOWSHEET (last 12 hours)     IRF OT Evaluation and Treatment     Row Name 22 1150          OT Time and Intention    Document Type daily treatment  -DN     Mode of Treatment occupational therapy  -DN     Patient Effort good  -DN     Comment, Evaluation/Treatment Not Performed pt dropped to 88% sats level on room air, and after shower , nsg informed  -DN     Row Name 22 1150          Pain Assessment    Pretreatment Pain Rating 0/10 - no pain  -DN     Posttreatment Pain Rating 0/10 - no pain  -DN     Row Name 22 1150          Cognition/Psychosocial    Affect/Mental Status (Cognition) WFL  -DN     Orientation Status (Cognition) oriented x 3  -DN     Personal Safety Interventions fall prevention program maintained;gait belt  -DN     Cognitive Function memory deficit  -DN     Attention Deficit (Cognition) minimal deficit  -DN     Memory Deficit (Cognition) minimal deficit  -DN     Safety Deficit (Cognition) minimal deficit  -DN     Row Name 22 1150          Bathing    [place] Level (Bathing) bathing skills;minimum assist (75% patient effort);verbal cues;nonverbal cues (demo/gesture)  -DN     Assistive Device (Bathing) tub bench;long-handled sponge;grab bar/tub rail;hand held shower spray hose  -DN     Position (Bathing)  supported sitting;supported standing  -DN     Set-up Assistance (Bathing) obtain supplies  -DN     Comment (Bathing) vc to intiate and sequence  -DN     Row Name 09/19/22 1150          Upper Body Dressing    [place] Level (Upper Body Dressing) upper body dressing skills;doff;don;pull over garment;minimum assist (75% or more patient effort);verbal cues;nonverbal cues (demo/gesture)  -DN     Position (Upper Body Dressing) supported sitting  -DN     Set-up Assistance (Upper Body Dressing) obtain clothing  -DN     Row Name 09/19/22 1150          Lower Body Dressing    [place] Level (Lower Body Dressing) maximum assist (25% patient effort);verbal cues;nonverbal cues (demo/gesture);pants/bottoms;underwear  -DN     Assistive Device Use (Lower Body Dressing) reacher  -DN     Position (Lower Body Dressing) supported sitting  -DN     Set-up Assistance (Lower Body Dressing) obtain clothing  -DN     Comment (Lower Body Dressing) dep for thigh high jobst stockings  -DN     Row Name 09/19/22 1150          Grooming    [place] Level (Grooming) grooming skills;supervision;verbal cues;nonverbal cues (demo/gesture)  -DN     Position (Grooming) supported sitting  -DN     Row Name 09/19/22 1150          Transfers    [place] Level (Shower Transfer) contact guard;verbal cues  -DN     Assistive Device (Shower Transfer) tub bench;grab bar, tub/shower;wheelchair  -DN     Row Name 09/19/22 1150          Shower Transfer    Type (Shower Transfer) stand pivot/stand step  -DN     Row Name 09/19/22 1150          Positioning and Restraints    Pre-Treatment Position sitting in chair/recliner  -DN     Post Treatment Position wheelchair  -DN     In Bed sitting;call light within reach;encouraged to call for assist;exit alarm on  -DN           User Key  (r) = Recorded By, (t) = Taken By, (c) = Cosigned By    Initials Name Effective Dates    Sina Currie OT 06/16/21 -                          OT Recommendation and Plan                          Time Calculation:      Time Calculation- OT     Row Name 09/19/22 0800             Time Calculation- OT    OT Start Time 0800  -DN      OT Stop Time 0900  -DN      OT Time Calculation (min) 60 min  -DN            User Key  (r) = Recorded By, (t) = Taken By, (c) = Cosigned By    Initials Name Provider Type    Sina Currie OT Occupational Therapist              Therapy Charges for Today     Code Description Service Date Service Provider Modifiers Qty    63880130331 HC OT SELF CARE/MGMT/TRAIN EA 15 MIN 9/19/2022 Sina Walls OT GO 4                   Sina Walls OT  9/19/2022

## 2022-09-19 NOTE — PROGRESS NOTES
Inpatient Rehabilitation Plan of Care Note    Plan of Care  Care Plan Reviewed - No updates at this time.    Pain    [RN] Pain Management(Active)  Current Status(09/14/2022): Pain currently not at tolerable rate  Weekly Goal(09/20/2022): Pain will be managed at tolerable rate  Discharge Goal: Patient will be able to tolerate pain and manage    Signed by: Lashay Torres RN

## 2022-09-19 NOTE — PROGRESS NOTES
LOS: 10 days   Patient Care Team:  Wale Jacob MD as PCP - General (Family Medicine)      JASBIR MAHMOOD  1950         ADMITTING DIAGNOSIS:  Immobility syndrome    Subjective     She states she not having lightheadedness like she did before.  Edema is little bit better.  Not the best appetite.  Encourage protein intake.  Ostomy functioning.        Objective     Vitals:    09/19/22 0942   BP: 106/65   Pulse: 73   Resp:    Temp:    SpO2:        PHYSICAL EXAM:     Awake, alert and 0x3  NAD  Heart: Regular rate and rhythm.  No rub murmur or gallop.  Lungs: Clear to auscultation bilaterally  Abdomen: Colostomy  Normoactive bowel sounds.  Soft and nontender  Incision-dressed  Ext: right elbow laceration sutured-dressed  Moving all 4 extremities- generalized weakness  1+ edema bilateral lower extremities    MEDICATIONS  Scheduled Meds:amLODIPine, 5 mg, Oral, Q24H  atorvastatin, 40 mg, Oral, Nightly  cholecalciferol, 2,000 Units, Oral, Daily  enoxaparin, 40 mg, Subcutaneous, Nightly  lisinopril, 5 mg, Oral, Q24H  pantoprazole, 40 mg, Oral, Q AM  sertraline, 25 mg, Oral, Daily      Continuous Infusions:   PRN Meds:.•  acetaminophen  •  albuterol  •  HYDROcodone-acetaminophen **OR** [DISCONTINUED] HYDROcodone-acetaminophen      RESULTS  No results found for: POCGLU  Results from last 7 days   Lab Units 09/15/22  0552 09/13/22  0651   WBC 10*3/mm3 5.91 8.05   HEMOGLOBIN g/dL 9.0* 9.7*   HEMATOCRIT % 28.6* 30.3*   PLATELETS 10*3/mm3 326 391     Results from last 7 days   Lab Units 09/15/22  0552   SODIUM mmol/L 142   POTASSIUM mmol/L 3.8   CHLORIDE mmol/L 103   CO2 mmol/L 33.0*   BUN mg/dL 9   CREATININE mg/dL 0.44*   CALCIUM mg/dL 8.8   GLUCOSE mg/dL 91       Echocardiogram-September 1, 2022  Summary:                 The ejection fraction biplane was calculated at 65%.                            Intravenous contrast was used to enhance endocardial border definition.                            The left  ventricular chamber size, wall thickness, and systolic function are within normal limits. There are                            no regional wall motion abnormalities observed.                            Abnormal left ventricular diastolic filling consistent with impaired relaxation.                            The peak instantaneous gradient of the aortic valve is 28.7 mmHg. The mean gradient of the aortic valve                            is 16 mmHg.                            Mild aortic leaflet calcification.                            Mild aortic stenosis.                            Trace aortic regurgitation is noted.       ASSESSMENT and PLAN    Aneurysm (HCC)    Immobility Syndrome  1.  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 Will teach family colostomy care. Pain controlled. Encouraged to at least take pain meds prior to therapy    3.  HTN- stable on norvasc, lisinopril. Running low.  Decreased lisinopril to 5 mg.   September 16-blood pressure 128-131/60-63    4.  Depression - low dose zoloft    5.  DVT proph- was on lovenox-Will continue 40 mg daily for now    6. Pulm - nasal cannula O2. Wean, keep sats > 90%  September 13-Taper down to 2 L  September 16-sats 92% on room air    7. Right lateral elbow laceration - August 31 - sutured  September 16-as incisions located over a mobile joint, will continue sutures until Monday, September 19.  September 19-laceration not inspected today- will plan to assess on rounds tomorrow for suture removal    8.  Orthostatic hypotension-she has bilateral lower extremity edema but will hold on adding diuretic given her orthostatic hypotension.  We will try thigh-high Jobst stockings 15-20 mm compression for both orthostasis as well as edema.  Echocardiogram recently showed ejection fraction 65%.  Recheck CBC and chemistries in a.m.      TEAM CONF - SEPT 13 - TRANSFERS MOD . GAIT 8 FEET PARALLEL  BARS MOD ASSIST. TOILET TRANSFERS MIN MOD. BATH MOD. LBD DEP. UBD MIN. EATING MIN. GROOMING MIN. TOILETING DEP. FATIGUES. USING PUREWICK.   OSTOMY EDUCATION. MEPILEX TO RIGHT ELBOW LACERATION. COGNITION - MILD TO MODERATE COGNITIVE IMPAIRMENT. VISUAL SPATIAL DEFICITS. OSTOMY EDUCATION. ABDOMINAL INCISION HEALING WITH JUST ONE SMALL AREA INFERIORLY WITH SLIGHT DRAINAGE. WILL DISCONTINUE PUREWICK. TO DO TIMED VOIDS.  PULMONARY - WEAN O2 .   ELOS - THREE WEEKS    September 16-Transfers contact-guard min assist.  Ambulated up to 45 feet contact-guard rolling walker.      Now admit for comprehensive acute inpatient rehabilitation .  This would be an interdisciplinary program with physical therapy 1.5 hour,  occupational therapy 1.5 hour,  5 days a week.  Rehabilitation nursing for carryover, monitoring of  GI   status, bowel and bladder, and skin  Ongoing physician follow-up.  Weekly team conferences.  Goals are to achieve a level of supervision with  mobility and self-care and improved activity tolerance.   Rehabilitation prognosis air.  Medical prognosis fair.  Estimated length of stay is approximately 10 days , but is only an estimation.     The patient's functional status and clinical status is unchanged from preadmission assessment and the patient continues appropriate for acute inpatient rehabilitation.  Goal is for home with  Home health   therapies.  Barrier to discharge:  Impaired mobility and self care   - work on  Transfers, balance, gait, ADLS  to overcome.             Hossein Rivers MD      During rounds, used appropriate personal protective equipment including mask and gloves.  Additional gown if indicated.  Mask used was standard procedure mask. Appropriate PPE was worn during the entire visit.  Hand hygiene was completed before and after.

## 2022-09-19 NOTE — THERAPY TREATMENT NOTE
Inpatient Rehabilitation - Speech Language Pathology Treatment Note    [facility]     Patient Name: Summer Smith  : 1950  MRN: [id number]    Today's Date: 2022                   Admit Date: 2022       Visit Dx:      ICD-10-CM ICD-9-CM   1. Follow-up exam  Z09 V67.9       Patient Active Problem List   Diagnosis   • Aneurysm (HCC)       Past Medical History:   Diagnosis Date   • COPD (chronic obstructive pulmonary disease) (HCC)    • Elevated cholesterol    • GERD (gastroesophageal reflux disease)        Past Surgical History:   Procedure Laterality Date   • CARDIAC CATHETERIZATION     • COLON SURGERY     • COLONOSCOPY     • SKIN BIOPSY         SLP Recommendation and Plan                                                   SLP EVALUATION (last 72 hours)     SLP SLC Evaluation     Row Name 22 1300 22 0900                Communication Assessment/Intervention    Document Type therapy note (daily note)  -SR therapy note (daily note)  -SR       Subjective Information no complaints  -SR no complaints  -SR       Patient Observations alert;cooperative;agree to therapy  -SR alert;cooperative;agree to therapy  -SR       Patient Effort good  -SR good  -SR       Symptoms Noted During/After Treatment none  -SR none  -SR                Pain Scale: Numbers Pre/Post-Treatment    Pretreatment Pain Rating 0/10 - no pain  -SR 0/10 - no pain  -SR       Posttreatment Pain Rating 0/10 - no pain  -SR 0/10 - no pain  -SR             User Key  (r) = Recorded By, (t) = Taken By, (c) = Cosigned By    Initials Name Effective Dates    SR Marlen Ochoa SLP 22 -                    EDUCATION    The patient has been educated in the following areas:       Cognitive Impairment.             SLP GOALS     Row Name 22 1300 22 0800          Attention Goal 1 (SLP)    Improve Attention by Goal 1 (SLP) -- attending to task;complete selective attention task;80%;independently (over 90% accuracy)  -SR      Time Frame (Attention Goal 1, SLP) -- by discharge  -SR     Progress (Attention Goal 1, SLP) -- 80%;with minimal cues (75-90%)  -SR     Progress/Outcomes (Attention Goal 1, SLP) -- good progress toward goal  -SR            Functional Problem Solving Skills Goal 1 (SLP)    Improve Problem Solving Through Goal 1 (SLP) -- determine solutions to simple ADL/safety problems;determine solutions to multifactorial problems;sequence steps in a task;complete organization/home management task;70%;independently (over 90% accuracy)  -SR     Time Frame (Problem Solving Goal 1, SLP) -- by discharge  -SR     Progress/Outcomes (Problem Solving Goal 1, SLP) -- good progress toward goal  -SR     Comment (Problem Solving Goal 1, SLP) -- Patient followed directions and used the clues from the paragraph to organize information on chart (2x5 grid) during a reasoning/logic activity with 50% acc given min cues; 70% acc given mod cues. Demonstrated difficulty with working memory, mental manipulation, and task initiation. Required extended time to complete task.  -SR            Functional Math Skills Goal 1 (SLP)    Improve Functional Math Skills Through Goal 1 (SLP) complete word problems involving time;complete functional math task;70%;independently (over 90% accuracy)  -SR --     Time Frame (Functional Math Skills Goal 1, SLP) by discharge  -SR --     Progress/Outcomes (Functional Math Skills Goal 1, SLP) goal ongoing  -SR --     Comment (Functional Math Skills Goal 1, SLP) Patient divided coin denomination into separate amounts with 80% acc given min-mod cues. Patient required multiple repetitions of instruction throughout activity.  -SR --            Executive Functional Skills Goal 1 (SLP)    Improve Executive Function Skills Goal 1 (SLP) demonstrate awareness of deficit;identify strategies, strengths, limitations;time management activity;complex organization/planning activity;home management activity;70%;independently (over 90%  accuracy)  -SR --     Time Frame (Executive Function Skills Goal 1, SLP) by discharge  -SR --     Progress/Outcomes (Executive Function Skills Goal 1, SLP) goal ongoing  -SR --     Comment (Executive Function Skills Goal 1, SLP) Patient answered simple word problems involving time/time management with 40% acc given no cues; 80% acc given mod cues. Demonstrated difficulty with thought manipulation.  -SR --            Right Hemisphere Function Goal 1 (SLP)    Improve Right Hemisphere Function Through Goal 1 (SLP) complete visuo-spatial activities (visual closure, trail making, mazes;with minimal cues (75-90%)  -SR complete visuo-spatial activities (visual closure, trail making, mazes;with minimal cues (75-90%)  -SR     Time Frame (Right Hemisphere Function Goal 1, SLP) by discharge  -SR by discharge  -SR     Progress/Outcomes (Right Hemisphere Function Goal 1, SLP) good progress toward goal  -SR good progress toward goal  -SR     Comment (Right Hemisphere Function Goal 1, SLP) -- Patient copied information on a clock face given visual with 50% acc given no cues; 100% acc given min-mod cues. SLP provided education regarding visuospatial skills and current difficulties.  -SR           User Key  (r) = Recorded By, (t) = Taken By, (c) = Cosigned By    Initials Name Provider Type    Marlen Becerra SLP Speech and Language Pathologist                            Time Calculation:        Time Calculation- SLP     Row Name 09/19/22 1445 09/19/22 1136          Time Calculation- SLP    SLP Start Time 1300  -SR 0900  -SR     SLP Stop Time 1330  -SR 0930  -SR     SLP Time Calculation (min) 30 min  -SR 30 min  -SR           User Key  (r) = Recorded By, (t) = Taken By, (c) = Cosigned By    Initials Name Provider Type    Marlen Becerra SLP Speech and Language Pathologist                  Therapy Charges for Today     Code Description Service Date Service Provider Modifiers Qty    [id number]  ST DEV OF COGN SKILLS INITIAL  15 MIN 9/19/2022 Marlen Ochoa, RAGHU  1    [id number]  ST DEV OF COGN SKILLS EACH ADDT'L 15 MIN 9/19/2022 Marlen Ochoa, RAGHU  3                           Marlen Ochoa, RAGHU  9/19/2022

## 2022-09-20 PROCEDURE — 97116 GAIT TRAINING THERAPY: CPT

## 2022-09-20 PROCEDURE — 97129 THER IVNTJ 1ST 15 MIN: CPT

## 2022-09-20 PROCEDURE — 25010000002 ENOXAPARIN PER 10 MG: Performed by: PHYSICAL MEDICINE & REHABILITATION

## 2022-09-20 PROCEDURE — 97110 THERAPEUTIC EXERCISES: CPT

## 2022-09-20 PROCEDURE — 97535 SELF CARE MNGMENT TRAINING: CPT

## 2022-09-20 PROCEDURE — 97130 THER IVNTJ EA ADDL 15 MIN: CPT

## 2022-09-20 RX ORDER — HYDROCODONE BITARTRATE AND ACETAMINOPHEN 5; 325 MG/1; MG/1
1 TABLET ORAL EVERY 8 HOURS PRN
Status: DISCONTINUED | OUTPATIENT
Start: 2022-09-20 | End: 2022-09-27

## 2022-09-20 RX ADMIN — PANTOPRAZOLE SODIUM 40 MG: 40 TABLET, DELAYED RELEASE ORAL at 05:31

## 2022-09-20 RX ADMIN — Medication 2000 UNITS: at 07:29

## 2022-09-20 RX ADMIN — AMLODIPINE BESYLATE 5 MG: 5 TABLET ORAL at 07:29

## 2022-09-20 RX ADMIN — LISINOPRIL 5 MG: 5 TABLET ORAL at 07:29

## 2022-09-20 RX ADMIN — SERTRALINE 25 MG: 25 TABLET, FILM COATED ORAL at 07:29

## 2022-09-20 RX ADMIN — ENOXAPARIN SODIUM 40 MG: 100 INJECTION SUBCUTANEOUS at 20:20

## 2022-09-20 RX ADMIN — ATORVASTATIN CALCIUM 40 MG: 20 TABLET, FILM COATED ORAL at 20:19

## 2022-09-20 NOTE — PROGRESS NOTES
Reviewed team conference report regarding progress, goals, and d/c date for Wednesday, 9/28, with patient and . Ostomy nurse was in room and  observed changing colostomy bag. One of patient's daughters has also been taught. Will do further teaching with  and daughters as needed prior to d/c. Scheduled family conference for Monday, 9/26 at 11:00 a.m. Will assist with plans.

## 2022-09-20 NOTE — PROGRESS NOTES
LOS: 11 days   Patient Care Team:  Wale Jacob MD as PCP - General (Family Medicine)      JASBIR MAHMOOD  1950         ADMITTING DIAGNOSIS:  Immobility syndrome    Subjective     She feels she is getting stronger.  Abdominal discomfort improved.  Tolerating therapies.  Endurance improving.  Edema better.    Objective     Vitals:    09/20/22 1216   BP: 126/56   Pulse: 79   Resp: 20   Temp: 98.1 °F (36.7 °C)   SpO2: 96%       PHYSICAL EXAM:     Awake, alert and 0x3  NAD  Heart: Regular rate and rhythm.  No rub murmur or gallop.  Lungs: Clear to auscultation bilaterally  Abdomen: Colostomy  Normoactive bowel sounds.  Soft and nontender  Incision-dressed  Ext: right elbow laceration sutured-dressed  Moving all 4 extremities- generalized weakness  Trace edema bilateral lower extremities    MEDICATIONS  Scheduled Meds:amLODIPine, 5 mg, Oral, Q24H  atorvastatin, 40 mg, Oral, Nightly  cholecalciferol, 2,000 Units, Oral, Daily  enoxaparin, 40 mg, Subcutaneous, Nightly  lisinopril, 5 mg, Oral, Q24H  pantoprazole, 40 mg, Oral, Q AM  sertraline, 25 mg, Oral, Daily      Continuous Infusions:   PRN Meds:.•  acetaminophen  •  albuterol  •  HYDROcodone-acetaminophen **OR** [DISCONTINUED] HYDROcodone-acetaminophen      RESULTS  No results found for: POCGLU  Results from last 7 days   Lab Units 09/19/22  1100 09/15/22  0552   WBC 10*3/mm3 7.22 5.91   HEMOGLOBIN g/dL 9.2* 9.0*   HEMATOCRIT % 28.8* 28.6*   PLATELETS 10*3/mm3 221 326     Results from last 7 days   Lab Units 09/19/22  1100 09/15/22  0552   SODIUM mmol/L 141 142   POTASSIUM mmol/L 4.5 3.8   CHLORIDE mmol/L 105 103   CO2 mmol/L 25.3 33.0*   BUN mg/dL 10 9   CREATININE mg/dL 0.49* 0.44*   CALCIUM mg/dL 9.0 8.8   GLUCOSE mg/dL 103* 91       Echocardiogram-September 1, 2022  Summary:                 The ejection fraction biplane was calculated at 65%.                            Intravenous contrast was used to enhance endocardial border definition.                             The left ventricular chamber size, wall thickness, and systolic function are within normal limits. There are                            no regional wall motion abnormalities observed.                            Abnormal left ventricular diastolic filling consistent with impaired relaxation.                            The peak instantaneous gradient of the aortic valve is 28.7 mmHg. The mean gradient of the aortic valve                            is 16 mmHg.                            Mild aortic leaflet calcification.                            Mild aortic stenosis.                            Trace aortic regurgitation is noted.       ASSESSMENT and PLAN    Aneurysm (HCC)    Immobility Syndrome  1.  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 Will teach family colostomy care. Pain controlled. Encouraged to at least take pain meds prior to therapy    3.  HTN- stable on norvasc, lisinopril. Running low.  Decreased lisinopril to 5 mg.   September 16-blood pressure 128-131/60-63    4.  Depression - low dose zoloft    5.  DVT proph- was on lovenox-Will continue 40 mg daily for now    6. Pulm - nasal cannula O2. Wean, keep sats > 90%  September 13-Taper down to 2 L  September 16-sats 92% on room air    7. Right lateral elbow laceration - August 31 - sutured  September 16-as incisions located over a mobile joint, will continue sutures until Monday, September 19.  September 19-laceration not inspected today- will plan to assess on rounds tomorrow for suture removal    8.  Orthostatic hypotension-she has bilateral lower extremity edema but will hold on adding diuretic given her orthostatic hypotension.  We will try thigh-high Jobst stockings 15-20 mm compression for both orthostasis as well as edema.  Echocardiogram recently showed ejection fraction 65%.  Recheck CBC and chemistries in a.m.      TEAM CONF - SEPT 13 - TRANSFERS  MOD . GAIT 8 FEET PARALLEL BARS MOD ASSIST. TOILET TRANSFERS MIN MOD. BATH MOD. LBD DEP. UBD MIN. EATING MIN. GROOMING MIN. TOILETING DEP. FATIGUES. USING PUREWICK.   OSTOMY EDUCATION. MEPILEX TO RIGHT ELBOW LACERATION. COGNITION - MILD TO MODERATE COGNITIVE IMPAIRMENT. VISUAL SPATIAL DEFICITS. OSTOMY EDUCATION. ABDOMINAL INCISION HEALING WITH JUST ONE SMALL AREA INFERIORLY WITH SLIGHT DRAINAGE. WILL DISCONTINUE PUREWICK. TO DO TIMED VOIDS.  PULMONARY - WEAN O2 .   ELOS - THREE WEEKS    September 16-Transfers contact-guard min assist.  Ambulated up to 45 feet contact-guard rolling walker.      TEAM CONF - SEPT 20 - TRANSFERS CTG MIN. CAR TRANSFERS MOD ASSIST.   GAIT 80 FEET RW CTG.   BATH MIN. LBD MIN UBD SBA. EATING SBA. TOILETING MIN ASSIST.   COGNITION - CUES TO GO STEP BY STEP AND NEEDS TO RE-READ DIRECTIONS. DEFICITS WITH WORKING MEMORY.  BNE (Active)  Att'n. - WNL  Exec. Fx. - Mildly Imp.  Rsng/Jgmnt - WNL  Arith - Mod. Imp.  Visuospatial Skills - Mildly Imp.  Visual Mem. - MIldly Imp.  Verbal Mem. - Mod. Imp., improved with cues  Emot - Pt reported improved mood  OCCASIONAL TEARFUL WITH PASTORAL COUNSELING.  EDUCATION ON COLOSTOMY.  EDEMA BETTER WITH COMPRESSION STOCKINGS.  ELOS - ONE WEEK.    Now admit for comprehensive acute inpatient rehabilitation .  This would be an interdisciplinary program with physical therapy 1.5 hour,  occupational therapy 1.5 hour,  5 days a week.  Rehabilitation nursing for carryover, monitoring of  GI   status, bowel and bladder, and skin  Ongoing physician follow-up.  Weekly team conferences.  Goals are to achieve a level of supervision with  mobility and self-care and improved activity tolerance.   Rehabilitation prognosis air.  Medical prognosis fair.  Estimated length of stay is approximately 10 days , but is only an estimation.     The patient's functional status and clinical status is unchanged from preadmission assessment and the patient continues appropriate for acute  inpatient rehabilitation.  Goal is for home with  Home health   therapies.  Barrier to discharge:  Impaired mobility and self care   - work on  Transfers, balance, gait, ADLS  to overcome.             Hossein Rivers MD      During rounds, used appropriate personal protective equipment including mask and gloves.  Additional gown if indicated.  Mask used was standard procedure mask. Appropriate PPE was worn during the entire visit.  Hand hygiene was completed before and after.

## 2022-09-20 NOTE — PLAN OF CARE
Goal Outcome Evaluation:              Outcome Evaluation: Pt is A&OX4. Con of bladder. Colostomy intact, changed this morning d/t leaking. BLE edema. Jobst stockings on during the day, ace wraps at night. Pt sat in recliner throughout the day. Abd incision, SPRING. Laceration to R upper arm, sutures intact, meplix for protection. Assist x1 to wc. 2L of O2 during the day, due to SOA on exertion. 4L of O2 at night. No C/O of pain this shift.

## 2022-09-20 NOTE — PROGRESS NOTES
Inpatient Rehabilitation Functional Measures Assessment and Plan of Care    Plan of Care  Updated Problems/Interventions  Mobility    [OT] Toilet Transfers(Active)  Current Status(09/19/2022): min/cga  Weekly Goal(09/27/2022): SBA  Discharge Goal: SBA    [OT] Tub/Shower Transfers(Active)  Current Status(09/19/2022): Min  Weekly Goal(09/27/2022): SBA  Discharge Goal: SBA        Self Care    [OT] Bathing(Active)  Current Status(09/19/2022): Min  Weekly Goal(09/27/2022): SBA  Discharge Goal: SBA    [OT] Dressing (Lower)(Active)  Current Status(09/19/2022): Max  Weekly Goal(09/27/2022): Mod  Discharge Goal: min A    [OT] Dressing (Upper)(Active)  Current Status(09/19/2022): Min/SBA  Weekly Goal(09/27/2022): SBA  Discharge Goal: SBA    [OT] Eating(Active)  Current Status(09/19/2022): SBA  Weekly Goal(09/27/2022): SBA  Discharge Goal: set up    [OT] Grooming(Active)  Current Status(09/19/2022): SBA  Weekly Goal(09/27/2022): SBA  Discharge Goal: SBA    [OT] Toileting(Active)  Current Status(09/19/2022): Max  Weekly Goal(09/27/2022): Mod  Discharge Goal: min A    Functional Measures  NIKKI Eating:  Branch  NIKKI Grooming: Branch  NIKKI Bathing:  Branch  NIKKI Upper Body Dressing:  Branch  NIKKI Lower Body Dressing:  Branch  NIKKI Toileting:  Branch    NIKKI Bladder Management  Level of Assistance:  Branch  Frequency/Number of Accidents this Shift:  Branch    NIKKI Bowel Management  Level of Assistance: Branch  Frequency/Number of Accidents this Shift: Branch    NIKKI Bed/Chair/Wheelchair Transfer:  Branch  NIKKI Toilet Transfer:  Branch  NIKKI Tub/Shower Transfer:  Branch    Previously Documented Mode of Locomotion at Discharge: Field  NIKKI Expected Mode of Locomotion at Discharge: Branch  NIKKI Walk/Wheelchair:  Branch  NIKKI Stairs:  Branch    NIKKI Comprehension:  Branch  NIKKI Expression:  Branch  NIKKI Social Interaction:  Branch  NIKKI Problem Solving:  Branch  NIKKI Memory:  Branch    Therapy Mode Minutes  Occupational Therapy: Branch  Physical Therapy:  Branch  Speech Language Pathology:  Branch    Signed by: Sina Walls, OTR/L

## 2022-09-20 NOTE — PLAN OF CARE
Goal Outcome Evaluation:      Slept well, in Recliner chair. Family at bedside. Meds with water. Continent of B&B. Only mild discomfort, no pain meds needed so far tonight.

## 2022-09-20 NOTE — THERAPY TREATMENT NOTE
Inpatient Rehabilitation - Physical Therapy Treatment Note       [facility]     Patient Name: Summer Smith  : 1950  MRN: [id number]    Today's Date: 2022                    Admit Date: 2022      Visit Dx:     ICD-10-CM ICD-9-CM   1. Follow-up exam  Z09 V67.9       Patient Active Problem List   Diagnosis   • Aneurysm (HCC)       Past Medical History:   Diagnosis Date   • COPD (chronic obstructive pulmonary disease) (HCC)    • Elevated cholesterol    • GERD (gastroesophageal reflux disease)        Past Surgical History:   Procedure Laterality Date   • CARDIAC CATHETERIZATION     • COLON SURGERY     • COLONOSCOPY     • SKIN BIOPSY         PT ASSESSMENT (last 12 hours)     IRF PT Evaluation and Treatment     Row Name 22 1124 22 1015       PT Time and Intention    Document Type daily treatment  -DP daily treatment  -EE    Mode of Treatment physical therapy;individual therapy  -DP physical therapy;individual therapy  -EE    Patient/Family/Caregiver Comments/Observations Pt sitting up in therapy gym upon Pt arrival  -DP Pt sitting up in chair, agreeable to PT.  -EE    Row Name 22 1124 22 1015       General Information    Patient Profile Reviewed yes  -DP --    Existing Precautions/Restrictions fall;oxygen therapy device and L/min  -DP fall;oxygen therapy device and L/min  colostomy  -EE    Row Name 22 1124 22 1015       Pain Assessment    Pretreatment Pain Rating 0/10 - no pain  -DP 0/10 - no pain  -EE    Posttreatment Pain Rating 0/10 - no pain  -DP 0/10 - no pain  -EE    Row Name 22 1124 22 1015       Cognition/Psychosocial    Affect/Mental Status (Cognition) WFL  -DP WFL  -EE    Orientation Status (Cognition) oriented x 3  -DP oriented x 3  -EE    Follows Commands (Cognition) follows one-step commands  -DP follows one-step commands  -EE    Personal Safety Interventions safety round/check completed;elopement precautions initiated;fall prevention  program maintained;gait belt;muscle strengthening facilitated;nonskid shoes/slippers when out of bed;supervised activity  -DP fall prevention program maintained;gait belt;muscle strengthening facilitated;nonskid shoes/slippers when out of bed;supervised activity  -EE    Row Name 09/20/22 1124 09/20/22 1015       Transfer Assessment/Treatment    Transfers sit-stand transfer;stand-sit transfer  -DP --    Comment, (Transfers) -- 5x STS from WC with CGA, cues for hand placement.  -EE    Row Name 09/20/22 1124 09/20/22 1015       Transfers    Sit-Stand [place] (Transfers) contact guard;verbal cues  -DP contact guard;verbal cues  -EE    Stand-Sit [place] (Transfers) contact guard;verbal cues  -DP contact guard;verbal cues  -EE    [place] Level (Toilet Transfer) minimum assist (75% patient effort)  -DP --    Assistive Device (Toilet Transfer) raised toilet seat;grab bars/safety frame;wheelchair  -DP --    Row Name 09/20/22 1124 09/20/22 1015       Sit-Stand Transfer    Assistive Device (Sit-Stand Transfers) walker, front-wheeled;wheelchair  -DP walker, front-wheeled;wheelchair  -EE    Row Name 09/20/22 1124 09/20/22 1015       Stand-Sit Transfer    Assistive Device (Stand-Sit Transfers) walker, front-wheeled;wheelchair  -DP walker, front-wheeled;wheelchair  -EE    Row Name 09/20/22 1124          Toilet Transfer    Type (Toilet Transfer) stand pivot/stand step  -DP     Row Name 09/20/22 1124 09/20/22 1015       Gait/Stairs (Locomotion)    [place] Level (Gait) contact guard  -DP contact guard;verbal cues  -EE    Assistive Device (Gait) walker, front-wheeled  -DP walker, front-wheeled  -EE    Distance in Feet (Gait) 80'x1, 15'x1  -DP 80' x 1, 20' x 1  -EE    Pattern (Gait) step-through  -DP step-through  -EE    Deviations/Abnormal Patterns (Gait) base of support, narrow;hunter decreased;stride length decreased  -DP base of support, narrow;hunter decreased;stride length decreased  -EE    Bilateral Gait  Deviations forward flexed posture;heel strike decreased  -DP forward flexed posture;heel strike decreased  -EE    Comment, (Gait/Stairs) Pt ambulated with 0.25 NC O2 during treatment today and when siting down after ambulating 80 feet she was at 93 SpO2 and then dropped to 91% for 5-10 seconds and then tai up to 94%.  -DP --    Row Name 09/20/22 1124 09/20/22 1015       Safety Issues, Functional Mobility    Impairments Affecting Function (Mobility) balance;endurance/activity tolerance;strength;shortness of breath  -DP balance;endurance/activity tolerance;strength;shortness of breath  -EE    Row Name 09/20/22 1124          Motor Skills    Therapeutic Exercise hip;knee  -DP     Row Name 09/20/22 1124 09/20/22 1015       Hip (Therapeutic Exercise)    Hip (Therapeutic Exercise) strengthening exercise;AROM (active range of motion)  -DP --    Hip AROM (Therapeutic Exercise) bilateral;sitting;10 repetitions;flexion;other (see comments)  Pt becomes easily fatigued and is unable to compplete full ROM adue to weakness and haviness of LE's  -DP --    Hip Strengthening (Therapeutic Exercise) bilateral;aBduction;sitting;resistance band;red;10 repetitions;2 sets  -DP sitting;bilateral;marching while seated  x8 reps  -EE    Row Name 09/20/22 1124 09/20/22 1015       Knee (Therapeutic Exercise)    Knee (Therapeutic Exercise) strengthening exercise  -DP --    Knee Strengthening (Therapeutic Exercise) bilateral;LAQ (long arc quad);sitting;1 lb free weight;5 repetitions;hamstring curls;resistance band;red;10 repetitions;2 sets  -DP sitting;bilateral;LAQ (long arc quad)  x8 reps  -EE    Row Name 09/20/22 1124 09/20/22 1015       Positioning and Restraints    Pre-Treatment Position sitting in chair/recliner  -DP sitting in chair/recliner  -EE    Post Treatment Position bathroom  -DP wheelchair  -EE    In Wheelchair -- sitting;exit alarm on;with SLP  -EE    Bathroom notified nsg;sitting;call light within reach;encouraged to call for  assist;with family/caregiver  -DP --    Row Name 09/20/22 1015          Vital Signs    Pre SpO2 (%) 97  -EE     O2 Delivery Pre Treatment supplemental O2  0.25 L  -EE     Intra SpO2 (%) 91  lowest observed during activity  -EE     O2 Delivery Intra Treatment supplemental O2  0.25 L  -EE     Post SpO2 (%) 95  -EE     O2 Delivery Post Treatment supplemental O2  0.25 L  -EE     Pre Patient Position Sitting  -EE     Intra Patient Position Sitting  immediately after ambulation/activity  -EE     Post Patient Position Sitting  -EE           User Key  (r) = Recorded By, (t) = Taken By, (c) = Cosigned By    Initials Name Provider Type    EE Rand Patel, PT Physical Therapist    DP Preston Hills, PT Physical Therapist              Wound 09/09/22 2200 Right upper arm Laceration (Active)   Dressing Appearance dry;intact 09/20/22 0720   Closure Sutures 09/20/22 0720   Base dressing in place, unable to visualize 09/19/22 1959   Drainage Amount none 09/20/22 0720   Dressing Care foam 09/19/22 1959       Wound 09/09/22 2200 Left midline abdomen Incision (Active)   Dressing Appearance open to air 09/19/22 1959   Closure Approximated 09/20/22 0720   Base clean;closed/resurfaced 09/20/22 0720   Drainage Amount none 09/20/22 0720   Dressing Care open to air 09/20/22 0720     Physical Therapy Education                 Title: PT OT SLP Therapies (Done)     Topic: Physical Therapy (Done)     Point: Mobility training (Done)     Learning Progress Summary           Patient Acceptance, E,D, VU,DU by DP at 9/20/2022 1141    Acceptance, E,TB, VU,NR by EE at 9/20/2022 1044    Acceptance, E,D, VU,DU by DP at 9/19/2022 1212    Acceptance, E,TB,D, VU,DU by JS at 9/17/2022 1450    Acceptance, E,D, VU,DU by DP at 9/16/2022 1533    Acceptance, E, VU by WN at 9/16/2022 0123    Acceptance, E,D, VU,DU by DP at 9/15/2022 1144    Acceptance, E,D, VU,DU by DP at 9/14/2022 1143    Acceptance, E, VU,NR by MG at 9/13/2022 1143    Acceptance, E,TB, VU,NR by  EE at 9/10/2022 1518                   Point: Home exercise program (Done)     Learning Progress Summary           Patient Acceptance, E,D, VU,DU by DP at 9/20/2022 1141    Acceptance, E,TB, VU,NR by EE at 9/20/2022 1044    Acceptance, E,D, VU,DU by DP at 9/19/2022 1212    Acceptance, E,TB,D, VU,DU by JS at 9/17/2022 1450    Acceptance, E,D, VU,DU by DP at 9/16/2022 1533    Acceptance, E, VU by WN at 9/16/2022 0123    Acceptance, E,D, VU,DU by DP at 9/15/2022 1144    Acceptance, E,D, VU,DU by DP at 9/14/2022 1143    Acceptance, E, VU,NR by MG at 9/13/2022 1143    Acceptance, E,TB, VU,NR by EE at 9/10/2022 1518                   Point: Body mechanics (Done)     Learning Progress Summary           Patient Acceptance, E,D, VU,DU by DP at 9/20/2022 1141    Acceptance, E,TB, VU,NR by EE at 9/20/2022 1044    Acceptance, E,D, VU,DU by DP at 9/19/2022 1212    Acceptance, E,TB,D, VU,DU by JS at 9/17/2022 1450    Acceptance, E,D, VU,DU by DP at 9/16/2022 1533    Acceptance, E, VU by WN at 9/16/2022 0123    Acceptance, E,D, VU,DU by DP at 9/15/2022 1144    Acceptance, E,D, VU,DU by DP at 9/14/2022 1143    Acceptance, E, VU,NR by MG at 9/13/2022 1143    Acceptance, E,TB, VU,NR by EE at 9/10/2022 1518                   Point: Precautions (Done)     Learning Progress Summary           Patient Acceptance, E,D, VU,DU by DP at 9/20/2022 1141    Acceptance, E,TB, VU,NR by EE at 9/20/2022 1044    Acceptance, E,D, VU,DU by DP at 9/19/2022 1212    Acceptance, E,TB,D, VU,DU by JS at 9/17/2022 1450    Acceptance, E,D, VU,DU by DP at 9/16/2022 1533    Acceptance, E, VU by WN at 9/16/2022 0123    Acceptance, E,D, VU,DU by DP at 9/15/2022 1144    Acceptance, E,D, VU,DU by DP at 9/14/2022 1143    Acceptance, E, VU,NR by MG at 9/13/2022 1143    Acceptance, E, VU by MD at 9/12/2022 1114    Acceptance, E,TB, VU,NR by EE at 9/10/2022 1518                               User Key     Initials Effective Dates Name Provider Type Discipline    JS  06/16/21 -  Stella Lane, PT Physical Therapist PT    EE 06/16/21 -  Rand Patel, PT Physical Therapist PT    MD 06/16/21 -  Ale Garza, PT Physical Therapist PT    MG 05/24/22 -  Elisabeth Bowen, PT Physical Therapist PT    WN 08/23/22 -  Sancho Eugene, RN Registered Nurse Nurse    DP 08/24/21 -  Preston Hills, PT Physical Therapist PT                PT Recommendation and Plan                          Time Calculation:      PT Charges     Row Name 09/20/22 1141 09/20/22 1044          Time Calculation    Start Time 1100  -DP 1000  -EE     Stop Time 1130  -DP 1030  -EE     Time Calculation (min) 30 min  -DP 30 min  -EE     PT Received On 09/20/22  -DP 09/20/22  -EE     PT - Next Appointment 09/21/22  -DP 09/20/22  -EE            Time Calculation- PT    Total Timed Code Minutes- PT 30 minute(s)  -DP 30 minute(s)  -EE           User Key  (r) = Recorded By, (t) = Taken By, (c) = Cosigned By    Initials Name Provider Type    EE Rand Patel, PT Physical Therapist    DP [place], PT Physical Therapist                Therapy Charges for Today     Code Description Service Date Service Provider Modifiers Qty    55859738437 HC PT THERAPEUTIC ACT EA 15 MIN 9/19/2022 Preston Hills, PT GP 1    04440803254 HC PT THER PROC EA 15 MIN 9/19/2022 JeanaPreston arriaga, PT GP 1    69146816671 HC PT THER PROC EA 15 MIN 9/19/2022 Preston Hills, PT GP 1    07267452769 HC PT THERAPEUTIC ACT EA 15 MIN 9/19/2022 Preston Hills, PT GP 1    31043464988 HC GAIT TRAINING EA 15 MIN 9/20/2022 Preston Hills, PT GP 1    35660715072 HC PT THER PROC EA 15 MIN 9/20/2022 Preston Hills, PT GP 1              Patient was wearing a face mask during this therapy encounter. Therapist used appropriate personal protective equipment including mask and gloves.  Mask used was standard procedure mask. Appropriate PPE was worn during the entire therapy session. Hand hygiene was completed before and after therapy session. Patient is not in enhanced droplet  precautions.         Preston Hills, PT  9/20/2022

## 2022-09-20 NOTE — PROGRESS NOTES
Case Management  Inpatient Rehabilitation Team Conference    Conference Date/Time: 9/20/2022 9:22:27 AM    Team Conference Attendees:  Dr. Hossein Roman, Pharmacist  Veronika Moreno, BHARATHW  Denise Hartmann, PT  Sina Walls, OT  Lashay Hines, SLP  Veronika Emmanuel, CTRS  Mara Chappell RD, LD  Elisha Chaparro, RN  Princess Raza, Chaplain Meka    Demographics            Age: 71Y            Gender: Female    Admission Date: 9/9/2022 9:57:00 PM  Rehabilitation Diagnosis:  Immobility Syndrome  Past Medical History: +?  PMH-COPD- was not on 02  GERD  T/A surgery  Inner ear surgery  Tubal ligation  ?      Plan of Care  Anticipated Discharge Date/Estimated Length of Stay: 3 weeks  Anticipated Discharge Destination: Community discharge with assistance  Discharge Plan : Patient lives with  in one story home with ramp  entrance. One step from porch into home.  D/C plan is home with  and intermittent assist from daughters who live  close by.  Medical Necessity Expected Level Rationale: Goals are to acheieve a level of  contact guard assist with ambulation and ADL's.  Rehab prognosis fair.  Medical  prognosis fair.  Intensity and Duration: an average of 3 hours/5 days per week  Medical Supervision and 24 Hour Rehab Nursing: x  Physical Therapy: x  PT Intensity/Duration: 1 hr / day, 5 days / week, for approximately 4 weeks.  Occupational Therapy: x  OT Intensity/Duration: 1 hr / day, 5 days / week, for approximately 4 weeks.  Speech and Language Therapy: x  SLP Intensity/Duration: 1 hr / day, 5 days / week, for approximately 4 weeks.  Social Work: x  Therapeutic Recreation: x  Psychology: x  Registered Dietician: x  Updated (if changes indicated)    Anticipated Discharge Date/Estimated Length of Stay:   ELOS: DC 9/28    Based on the patient's medical and functional status, their prognosis and  expected level of functional improvement is: Goals are to acheieve a level of  SBA with rwx with ambulation  and ADL's.  Rehab prognosis fair.  Medical  prognosis fair.      Interdisciplinary Problem/Goals/Status    All Rehab Problems:  Cognition    [ST] Executive Functions(Active)  Current Status(09/19/2022): Mild-moderate cognitive impairment characterized by  difficulty with visuospatial skills, attention, mental manipulation,  organization, and executive functioning skills (planning,deficit awareness).  Weekly Goal(09/26/2022): Pt will use call light appropriately; recall details of  the day; participate in structured therapy activities given min-mod cues  Discharge Goal: Functional executive functioning skills for home        Mobility    [OT] Toilet Transfers(Active)  Current Status(09/19/2022): min/cga  Weekly Goal(09/27/2022): SBA  Discharge Goal: SBA    [OT] Tub/Shower Transfers(Active)  Current Status(09/19/2022): Min  Weekly Goal(09/27/2022): SBA  Discharge Goal: SBA    [PT] Bed/Chair/Wheelchair(Active)  Current Status(09/20/2022): CGA rwx  Weekly Goal(09/27/2022): SBA  Discharge Goal: SBA    [PT] Walk(Active)  Current Status(09/20/2022): 80' CGA / SBA rwx using 0.5L NC  Weekly Goal(09/27/2022): SBA  Discharge Goal: SBA        Pain    [RN] Pain Management(Active)  Current Status(09/20/2022): Pain currently not at tolerable rate  Weekly Goal(09/26/2022): Pain will be managed at tolerable rate  Discharge Goal: Patient will be able to tolerate pain and manage        Psychosocial    [RN] Coping/Adjustment(Active)  Current Status(09/20/2022): Pt is adjusting to bowel resection/colostomy  Weekly Goal(09/27/2022): Patient adjusted to new bowel regimen  Discharge Goal: Patient coping well with new colostomy        Safety    [RN] Potential for Injury(Active)  Current Status(09/20/2022): Patient at risk for falls due to weakness from  abdominal surger  Weekly Goal(09/26/2022): Patient will use call light appropriately  Discharge Goal: Patient will be aware of risk of falls        Self Care    [OT] Bathing(Active)  Current  Status(09/19/2022): Min  Weekly Goal(09/27/2022): SBA  Discharge Goal: SBA    [OT] Dressing (Lower)(Active)  Current Status(09/20/2022): Min A  Weekly Goal(09/27/2022): SBA  Discharge Goal: SBA    [OT] Dressing (Upper)(Active)  Current Status(09/20/2022): SBA  Weekly Goal(09/27/2022): SBA  Discharge Goal: SBA    [OT] Eating(Active)  Current Status(09/19/2022): SBA  Weekly Goal(09/27/2022): SBA  Discharge Goal: set up    [OT] Grooming(Active)  Current Status(09/19/2022): SBA  Weekly Goal(09/27/2022): SBA  Discharge Goal: SBA    [OT] Toileting(Active)  Current Status(09/20/2022): Mod A  Weekly Goal(09/27/2022): SBA  Discharge Goal: SBA        Sphincter Control    [RN] Bladder Management(Active)  Current Status(09/20/2022): Patient using external catheter for bedtime  Weekly Goal(09/26/2022): Pt continent of bladder  Discharge Goal: Pt continent 100%    [RN] Bowel Management(Active)  Current Status(09/20/2022): Patient has a new colostomy  Weekly Goal(09/26/2022): Patient will maintain skin integrity around stoma  Discharge Goal: Patient will demonstrate techniques to maintain skin integrity  and change appliance independently        Swallow Function    [ST] Swallowing(Active)  Current Status(09/19/2022): Mechanical soft / thin liquids; medication whole  with thin liquid  Weekly Goal(09/26/2022): Patient will tolerate least restrictive diet with no  overt s/sx of aspiration or penetration.  Discharge Goal: Patient will tolerate least restrictive diet with no overt s/sx  of aspiration or penetration.        Comments: 9/13 Mod A x 1 for transfers, amb 8' in parallel bars Mod A w/ wc  follow.  Min - Mod A toilet transfers.  Slow processing, follows commands well.  Toileting dependent.  Mild - Mod cognitive impairment.  [facility]  [facility].  Goal to discontinue purewick and encourage toileting schedule.  Patient w/ new colostomy.  Progressively decreasing oxygen.  PRN pain  medication.    9/20 CGA w/ rwx  transfers,  amb 80' w. CGA / SBA using 0.5L NC.  Min A / CGA  toilet transfers, Min A shower transfers.  Min A LBD, SBA UBD.  SBA eating,  grooming.  Mod A with toileting.  Mild -Mod cognitive deficits.  Betty mechanical  soft diet with thin liquids.    Signed by: Elisha Chaparro RN    Physician CoSigned By: Hossein Rivers 09/20/2022 09:33:12

## 2022-09-20 NOTE — PLAN OF CARE
Goal Outcome Evaluation:           Progress: improving  Outcome Evaluation: Mrs. Wheeler is alert and oriented pleasant and cooperative, worked well in therapies, incision clean and intact to abdomen healing well, colostomy bag leaked and had to be changed, ostomy nurse came by and assessed, continent of bladder, continues with edema to BLE and trace to Jonh upper extremities, no complaints of pain or distress, family stays at bedside.

## 2022-09-20 NOTE — THERAPY TREATMENT NOTE
Inpatient Rehabilitation - Occupational Therapy Treatment Note    [facility]     Patient Name: Summer Smith  : 1950  MRN: [id number]    Today's Date: 2022                 Admit Date: 2022         ICD-10-CM ICD-9-CM   1. Follow-up exam  Z09 V67.9       Patient Active Problem List   Diagnosis   • Aneurysm (HCC)       Past Medical History:   Diagnosis Date   • COPD (chronic obstructive pulmonary disease) (HCC)    • Elevated cholesterol    • GERD (gastroesophageal reflux disease)        Past Surgical History:   Procedure Laterality Date   • CARDIAC CATHETERIZATION     • COLON SURGERY     • COLONOSCOPY     • SKIN BIOPSY               IRF OT ASSESSMENT FLOWSHEET (last 12 hours)     IRF OT Evaluation and Treatment     Row Name 22 1145          OT Time and Intention    Document Type daily treatment  -DN     Mode of Treatment occupational therapy  -DN     Patient Effort good  -DN     Comment, Evaluation/Treatment Not Performed pt on 2 liters o2  -DN     Row Name 22 1145          Pain Assessment    Pretreatment Pain Rating 0/10 - no pain  -DN     Posttreatment Pain Rating 0/10 - no pain  -DN     Row Name 22 1145          Bathing    [place] Level (Bathing) bathing skills;minimum assist (75% patient effort);verbal cues;nonverbal cues (demo/gesture)  -DN     Position (Bathing) sink side;supported sitting;supported standing  -DN     Set-up Assistance (Bathing) obtain supplies  -DN     Comment (Bathing) vc to sequence and initiate  -DN     Row Name 22 1141          Upper Body Dressing    [place] Level (Upper Body Dressing) upper body dressing skills;doff;don;pull over garment  -DN     Position (Upper Body Dressing) supported sitting  -DN     Set-up Assistance (Upper Body Dressing) obtain clothing  -DN     Comment (Upper Body Dressing) no bra  -DN     Row Name 22 1142          Lower Body Dressing    [place] Level (Lower Body Dressing)  doff;don;pants/bottoms;shoes/slippers;socks;underwear;minimum assist (75% patient effort);nonverbal cues (demo/gesture);verbal cues  -DN     Position (Lower Body Dressing) supported sitting;supported standing  -DN     Set-up Assistance (Lower Body Dressing) obtain clothing  -DN     Comment (Lower Body Dressing) pt did not need to use reacher for pants today  -DN     Row Name 09/20/22 1145          Grooming    [place] Level (Grooming) grooming skills;supervision;verbal cues;nonverbal cues (demo/gesture)  -DN     Position (Grooming) sink side;supported sitting  -DN     Row Name 09/20/22 1145          Toileting    [place] Level (Toileting) toileting skills;moderate assist (50% patient effort);verbal cues;nonverbal cues (demo/gesture)  -DN     Assistive Device Use (Toileting) grab bar/safety frame;raised toilet seat  -DN     Position (Toileting) supported sitting;supported standing  -DN     Set-up Assistance (Toileting) obtain supplies  -DN     Row Name 09/20/22 1145          Transfer Assessment/Treatment    Comment, (Transfers) chair to w/c with CGA and RLE buckled but pt able to recover independently  -DN     Row Name 09/20/22 1145          Transfers    [place] Level (Toilet Transfer) contact guard  -DN     Assistive Device (Toilet Transfer) walker, front-wheeled;raised toilet seat;grab bars/safety frame  -DN     Row Name 09/20/22 1145          Toilet Transfer    Type (Toilet Transfer) stand pivot/stand step  -DN     Row Name 09/20/22 1145          Shoulder (Therapeutic Exercise)    Shoulder (Therapeutic Exercise) strengthening exercise;wand exercises  -DN     Shoulder AROM (Therapeutic Exercise) bilateral;10 repetitions;3 sets  -DN     Shoulder Strengthening (Therapeutic Exercise) 1 lb free weight  -DN     Row Name 09/20/22 1145          Positioning and Restraints    Pre-Treatment Position sitting in chair/recliner  -DN     Post Treatment Position chair  -DN     In Bed exit alarm on;encouraged to call  for assist;call light within reach  -DN           User Key  (r) = Recorded By, (t) = Taken By, (c) = Cosigned By    Initials Name Effective Dates    Sina Currie OT 06/16/21 -                          OT Recommendation and Plan                         Time Calculation:      Time Calculation- OT     Row Name 09/20/22 0800             Time Calculation- OT    OT Start Time 0800  -DN      OT Stop Time 0900  -DN      OT Time Calculation (min) 60 min  -DN            User Key  (r) = Recorded By, (t) = Taken By, (c) = Cosigned By    Initials Name Provider Type    Sina Currie OT Occupational Therapist              Therapy Charges for Today     Code Description Service Date Service Provider Modifiers Qty    72851954294 HC OT SELF CARE/MGMT/TRAIN EA 15 MIN 9/19/2022 Sina Walls OT GO 4    39532610872 HC OT SELF CARE/MGMT/TRAIN EA 15 MIN 9/20/2022 Sina Walls OT GO 3    12110924336 HC OT THER PROC EA 15 MIN 9/20/2022 Sina Walls OT GO 1                   Sina Walls OT  9/20/2022

## 2022-09-20 NOTE — PROGRESS NOTES
TEAM CONF - SEPT 20 - TRANSFERS CTG MIN. CAR TRANSFERS MOD ASSIST.   GAIT 80 FEET RW CTG.   BATH MIN. LBD MIN UBD SBA. EATING SBA. TOILETING MIN ASSIST.   COGNITION - CUES TO GO STEP BY STEP AND NEEDS TO RE-READ DIRECTIONS. DEFICITS WITH WORKING MEMORY.  BNE (Active)  Att'n. - WNL  Exec. Fx. - Mildly Imp.  Rsng/Jgmnt - WNL  Arith - Mod. Imp.  Visuospatial Skills - Mildly Imp.  Visual Mem. - MIldly Imp.  Verbal Mem. - Mod. Imp., improved with cues  Emot - Pt reported improved mood  OCCASIONAL TEARFUL WITH PASTORAL COUNSELING.  EDUCATION ON COLOSTOMY.  [place]EMA BETTER WITH COMPRESSION STOCKINGS.  ELOS - ONE WEEK.

## 2022-09-20 NOTE — PROGRESS NOTES
Inpatient Rehabilitation Plan of Care Note    Plan of Care  Care Plan Reviewed - Updates as Follows    Pain    [RN] Pain Management(Active)  Current Status(09/19/2022): Pain currently not at tolerable rate  Weekly Goal(09/26/2022): Pain will be managed at tolerable rate  Discharge Goal: Patient will be able to tolerate pain and manage    Performed Intervention(s)  Pain medication as requested and available  Repositioning and wound care      Psychosocial    [RN] Coping/Adjustment(Active)  Current Status(09/19/2022): Pt is adjusting to bowel resection/colostomy  Weekly Goal(09/17/2022): Patient adjusted to new bowel regimen  Discharge Goal: Patient coping well with new colostomy    Performed Intervention(s)  supportive family  Pt given opportunity to express concerns/feelings      Safety    [RN] Potential for Injury(Active)  Current Status(09/19/2022): Patient at risk for falls due to weakness from  abdominal surger  Weekly Goal(09/26/2022): Patient will use call light appropriately  Discharge Goal: Patient will be aware of risk of falls    Performed Intervention(s)  Personal items and call light w/in reach  falls precaution  hourly rounding      Sphincter Control    [RN] Bladder Management(Active)  Current Status(09/19/2022): Patient using external catheter for bedtime  Weekly Goal(09/26/2022): Pt continent of bladder  Discharge Goal: Pt continent 100%    [RN] Bowel Management(Active)  Current Status(09/19/2022): Patient has a new colostomy  Weekly Goal(09/20/2022): Patient will maintain skin integrity around stoma  Discharge Goal: Patient will demonstrate techniques to maintain skin integrity  and change appliance independently    Performed Intervention(s)  Incontinance products and elo care  answer call light promptly  WCON consult  skin/stoma assessment    Signed by: Evelyne Villarreal RN

## 2022-09-20 NOTE — PROGRESS NOTES
Inpatient Rehabilitation Plan of Care Note    Plan of Care  Care Plan Reviewed - Updates as Follows    Pain    [RN] Pain Management(Active)  Current Status(09/20/2022): Pain currently not at tolerable rate  Weekly Goal(09/26/2022): Pain will be managed at tolerable rate  Discharge Goal: Patient will be able to tolerate pain and manage    Performed Intervention(s)  Pain medication as requested and available  Repositioning and wound care      Psychosocial    [RN] Coping/Adjustment(Active)  Current Status(09/20/2022): Pt is adjusting to bowel resection/colostomy  Weekly Goal(09/27/2022): Patient adjusted to new bowel regimen  Discharge Goal: Patient coping well with new colostomy    Performed Intervention(s)  supportive family  Pt given opportunity to express concerns/feelings      Safety    [RN] Potential for Injury(Active)  Current Status(09/20/2022): Patient at risk for falls due to weakness from  abdominal surger  Weekly Goal(09/26/2022): Patient will use call light appropriately  Discharge Goal: Patient will be aware of risk of falls    Performed Intervention(s)  Personal items and call light w/in reach  falls precaution  hourly rounding      Sphincter Control    [RN] Bladder Management(Active)  Current Status(09/20/2022): Patient using external catheter for bedtime  Weekly Goal(09/26/2022): Pt continent of bladder  Discharge Goal: Pt continent 100%    [RN] Bowel Management(Active)  Current Status(09/20/2022): Patient has a new colostomy  Weekly Goal(09/26/2022): Patient will maintain skin integrity around stoma  Discharge Goal: Patient will demonstrate techniques to maintain skin integrity  and change appliance independently    Performed Intervention(s)  Incontinance products and elo care  answer call light promptly  WCON consult  skin/stoma assessment    Signed by: Salma Raza RN

## 2022-09-20 NOTE — THERAPY TREATMENT NOTE
Inpatient Rehabilitation - Speech Language Pathology Treatment Note    [facility]     Patient Name: Summer Smith  : 1950  MRN: [id number]    Today's Date: 2022                   Admit Date: 2022       Visit Dx:      ICD-10-CM ICD-9-CM   1. Follow-up exam  Z09 V67.9       Patient Active Problem List   Diagnosis   • Aneurysm (HCC)       Past Medical History:   Diagnosis Date   • COPD (chronic obstructive pulmonary disease) (HCC)    • Elevated cholesterol    • GERD (gastroesophageal reflux disease)        Past Surgical History:   Procedure Laterality Date   • CARDIAC CATHETERIZATION     • COLON SURGERY     • COLONOSCOPY     • SKIN BIOPSY         SLP Recommendation and Plan                                                   SLP EVALUATION (last 72 hours)     SLP SLC Evaluation     Row Name 22 1300 22 1030 22 1300 22 0900          Communication Assessment/Intervention    Document Type therapy note (daily note)  -SR therapy note (daily note)  -SR therapy note (daily note)  -SR therapy note (daily note)  -SR     Subjective Information no complaints  -SR no complaints  -SR no complaints  -SR no complaints  -SR     Patient Observations alert;cooperative;agree to therapy  -SR alert;cooperative;agree to therapy  -SR alert;cooperative;agree to therapy  -SR alert;cooperative;agree to therapy  -SR     Patient Effort good  -SR good  -SR good  -SR good  -SR     Symptoms Noted During/After Treatment none  -SR none  -SR none  -SR none  -SR            Pain Scale: Numbers Pre/Post-Treatment    Pretreatment Pain Rating 0/10 - no pain  -SR 0/10 - no pain  -SR 0/10 - no pain  -SR 0/10 - no pain  -SR     Posttreatment Pain Rating 0/10 - no pain  -SR 0/10 - no pain  -SR 0/10 - no pain  -SR 0/10 - no pain  -SR           User Key  (r) = Recorded By, (t) = Taken By, (c) = Cosigned By    Initials Name Effective Dates    SR Marlen Ochoa SLP 22 -                    EDUCATION    The  patient has been educated in the following areas:       Cognitive Impairment.             SLP GOALS     Row Name 09/20/22 1300 09/20/22 1030 09/19/22 1300       Attention Goal 1 (SLP)    Improve Attention by Goal 1 (SLP) attending to task;complete selective attention task;80%;independently (over 90% accuracy)  -SR attending to task;complete selective attention task;80%;independently (over 90% accuracy)  -SR --    Time Frame (Attention Goal 1, SLP) by discharge  -SR by discharge  -SR --    Progress/Outcomes (Attention Goal 1, SLP) good progress toward goal  -SR good progress toward goal  -SR --    Comment (Attention Goal 1, SLP) Patient unscrambled 8-10 word sentence in order with 70% acc given no cues; 100% acc given min cues.  -SR Visual scanning/alternating attention; patient denys continuous line from each picture in alphabetical order with 70% acc given no cues. She self-corrected mistake x3 during activity and used a visual to help her keep track of where she was on the page.  -SR --       Organizational Skills Goal 1 (SLP)    Improve Thought Organization Through Goal 1 (SLP) completing mental manipulation task;80%;independently (over 90% accuracy)  -SR -- --    Time Frame (Thought Organization Skills Goal 1, SLP) by discharge  -SR -- --    Progress (Thought Organization Skills Goal 1, SLP) 70%;independently (over 90% accuracy)  -SR -- --    Progress/Outcomes (Thought Organization Skills Goal 1, SLP) good progress toward goal  -SR -- --       Functional Problem Solving Skills Goal 1 (SLP)    Improve Problem Solving Through Goal 1 (SLP) determine solutions to simple ADL/safety problems;determine solutions to multifactorial problems;sequence steps in a task;complete organization/home management task;70%;independently (over 90% accuracy)  -SR determine solutions to simple ADL/safety problems;determine solutions to multifactorial problems;sequence steps in a task;complete organization/home management  task;70%;independently (over 90% accuracy)  -SR --    Time Frame (Problem Solving Goal 1, SLP) by discharge  -SR by discharge  -SR --    Progress/Outcomes (Problem Solving Goal 1, SLP) good progress toward goal;goal partially met  -SR good progress toward goal;goal partially met  -SR --    Comment (Problem Solving Goal 1, SLP) Patient followed directions and used the clues from the paragraph to organize information on a garden plot (4x3 grid) during a reasoning/logic activity with 50% acc given no cues; 50% acc given min cues. Patient reported that she was able to attend to task much better than yesterday.  -SR Patient labeled 5 steps in sequential order with 100% acc given no cues. In a following activity, patient listened to two sentences and labeled content as same/different with 90% acc given no cues.  -SR --       Functional Math Skills Goal 1 (SLP)    Improve Functional Math Skills Through Goal 1 (SLP) -- -- complete word problems involving time;complete functional math task;70%;independently (over 90% accuracy)  -SR    Time Frame (Functional Math Skills Goal 1, SLP) -- -- by discharge  -SR    Progress/Outcomes (Functional Math Skills Goal 1, SLP) -- -- goal ongoing  -SR    Comment (Functional Math Skills Goal 1, SLP) -- -- Patient divided coin denomination into separate amounts with 80% acc given min-mod cues. Patient required multiple repetitions of instruction throughout activity.  -SR       Executive Functional Skills Goal 1 (SLP)    Improve Executive Function Skills Goal 1 (SLP) demonstrate awareness of deficit;identify strategies, strengths, limitations;time management activity;complex organization/planning activity;home management activity;70%;independently (over 90% accuracy)  -SR demonstrate awareness of deficit;identify strategies, strengths, limitations;time management activity;complex organization/planning activity;home management activity;70%;independently (over 90% accuracy)  -SR demonstrate  awareness of deficit;identify strategies, strengths, limitations;time management activity;complex organization/planning activity;home management activity;70%;independently (over 90% accuracy)  -SR    Time Frame (Executive Function Skills Goal 1, SLP) by discharge  -SR by discharge  -SR by discharge  -SR    Progress/Outcomes (Executive Function Skills Goal 1, SLP) good progress toward goal  -SR goal ongoing  -SR goal ongoing  -SR    Comment (Executive Function Skills Goal 1, SLP) Patient used school schedule to answer questions about time with 63% acc given no cues; 100% acc given min cues.  -SR Patient used daily schedule to answer questions with 75% acc given no cues; 100% acc given min cues.  -SR Patient answered simple word problems involving time/time management with 40% acc given no cues; 80% acc given mod cues. Demonstrated difficulty with thought manipulation.  -SR       Right Hemisphere Function Goal 1 (SLP)    Improve Right Hemisphere Function Through Goal 1 (SLP) -- -- complete visuo-spatial activities (visual closure, trail making, mazes;with minimal cues (75-90%)  -SR    Time Frame (Right Hemisphere Function Goal 1, SLP) -- -- by discharge  -SR    Progress/Outcomes (Right Hemisphere Function Goal 1, SLP) -- -- good progress toward goal  -SR    Row Name 09/19/22 0800             Attention Goal 1 (SLP)    Improve Attention by Goal 1 (SLP) attending to task;complete selective attention task;80%;independently (over 90% accuracy)  -SR      Time Frame (Attention Goal 1, SLP) by discharge  -SR      Progress (Attention Goal 1, SLP) 80%;with minimal cues (75-90%)  -SR      Progress/Outcomes (Attention Goal 1, SLP) good progress toward goal  -SR              Functional Problem Solving Skills Goal 1 (SLP)    Improve Problem Solving Through Goal 1 (SLP) determine solutions to simple ADL/safety problems;determine solutions to multifactorial problems;sequence steps in a task;complete organization/home management  task;70%;independently (over 90% accuracy)  -SR      Time Frame (Problem Solving Goal 1, SLP) by discharge  -SR      Progress/Outcomes (Problem Solving Goal 1, SLP) good progress toward goal  -SR      Comment (Problem Solving Goal 1, SLP) Patient followed directions and used the clues from the paragraph to organize information on chart (2x5 grid) during a reasoning/logic activity with 50% acc given min cues; 70% acc given mod cues. Demonstrated difficulty with working memory, mental manipulation, and task initiation. Required extended time to complete task.  -SR              Right Hemisphere Function Goal 1 (SLP)    Improve Right Hemisphere Function Through Goal 1 (SLP) complete visuo-spatial activities (visual closure, trail making, mazes;with minimal cues (75-90%)  -SR      Time Frame (Right Hemisphere Function Goal 1, SLP) by discharge  -SR      Progress/Outcomes (Right Hemisphere Function Goal 1, SLP) good progress toward goal  -SR      Comment (Right Hemisphere Function Goal 1, SLP) Patient copied information on a clock face given visual with 50% acc given no cues; 100% acc given min-mod cues. SLP provided education regarding visuospatial skills and current difficulties.  -SR            User Key  (r) = Recorded By, (t) = Taken By, (c) = Cosigned By    Initials Name Provider Type    Marlen Becerra SLP Speech and Language Pathologist                            Time Calculation:        Time Calculation- SLP     Row Name 09/20/22 1537 09/20/22 1255          Time Calculation- SLP    SLP Start Time 1300  -SR 1030  -SR     SLP Stop Time 1330  -SR 1100  -SR     SLP Time Calculation (min) 30 min  -SR 30 min  -SR           User Key  (r) = Recorded By, (t) = Taken By, (c) = Cosigned By    Initials Name Provider Type    Marlen Becerra SLP Speech and Language Pathologist                  Therapy Charges for Today     Code Description Service Date Service Provider Modifiers Qty    98933136168 [facility] DEV OF COGN  SKILLS INITIAL 15 MIN 9/19/2022 Marlen Ochoa SLP  1    13012944518 HC ST DEV OF COGN SKILLS EACH ADDT'L 15 MIN 9/19/2022 Marlen Ochoa SLP  3    88832040879 HC ST DEV OF COGN SKILLS INITIAL 15 MIN 9/20/2022 Marlen Ochoa SLP  1    96697846157 HC ST DEV OF COGN SKILLS EACH ADDT'L 15 MIN 9/20/2022 Marlen Ochoa SLP  3                           RAGHU Zavala  9/20/2022

## 2022-09-20 NOTE — THERAPY TREATMENT NOTE
Inpatient Rehabilitation - Physical Therapy Treatment Note       [facility]     Patient Name: Summer Smith  : 1950  MRN: [id number]    Today's Date: 2022                    Admit Date: 2022      Visit Dx:     ICD-10-CM ICD-9-CM   1. Follow-up exam  Z09 V67.9       Patient Active Problem List   Diagnosis   • Aneurysm (HCC)       Past Medical History:   Diagnosis Date   • COPD (chronic obstructive pulmonary disease) (HCC)    • Elevated cholesterol    • GERD (gastroesophageal reflux disease)        Past Surgical History:   Procedure Laterality Date   • CARDIAC CATHETERIZATION     • COLON SURGERY     • COLONOSCOPY     • SKIN BIOPSY         PT ASSESSMENT (last 12 hours)     IRF PT Evaluation and Treatment     Row Name 22 1015          PT Time and Intention    Document Type daily treatment  -EE     Mode of Treatment physical therapy;individual therapy  -EE     Patient/Family/Caregiver Comments/Observations Pt sitting up in chair, agreeable to PT.  -EE     Row Name 22 1015          General Information    Existing Precautions/Restrictions fall;oxygen therapy device and L/min  colostomy  -EE     Row Name 22 1015          Pain Assessment    Pretreatment Pain Rating 0/10 - no pain  -EE     Posttreatment Pain Rating 0/10 - no pain  -EE     Row Name 22 1015          Cognition/Psychosocial    Affect/Mental Status (Cognition) WFL  -EE     Orientation Status (Cognition) oriented x 3  -EE     Follows Commands (Cognition) follows one-step commands  -EE     Personal Safety Interventions fall prevention program maintained;gait belt;muscle strengthening facilitated;nonskid shoes/slippers when out of bed;supervised activity  -EE     Row Name 22 1015          Transfer Assessment/Treatment    Comment, (Transfers) 5x STS from WC with CGA, cues for hand placement.  -EE     Row Name 22 1015          Transfers    Sit-Stand [place] (Transfers) contact guard;verbal cues  -EE      Stand-Sit [place] (Transfers) contact guard;verbal cues  -EE     Row Name 09/20/22 1015          Sit-Stand Transfer    Assistive Device (Sit-Stand Transfers) walker, front-wheeled;wheelchair  -EE     Row Name 09/20/22 1015          Stand-Sit Transfer    Assistive Device (Stand-Sit Transfers) walker, front-wheeled;wheelchair  -EE     Row Name 09/20/22 1015          Gait/Stairs (Locomotion)    [place] Level (Gait) contact guard;verbal cues  -EE     Assistive Device (Gait) walker, front-wheeled  -EE     Distance in Feet (Gait) 80' x 1, 20' x 1  -EE     Pattern (Gait) step-through  -EE     Deviations/Abnormal Patterns (Gait) base of support, narrow;hunter decreased;stride length decreased  -EE     Bilateral Gait Deviations forward flexed posture;heel strike decreased  -EE     Row Name 09/20/22 1015          Safety Issues, Functional Mobility    Impairments Affecting Function (Mobility) balance;endurance/activity tolerance;strength;shortness of breath  -EE     Row Name 09/20/22 1015          Hip (Therapeutic Exercise)    Hip Strengthening (Therapeutic Exercise) sitting;bilateral;marching while seated  x8 reps  -EE     Row Name 09/20/22 1015          Knee (Therapeutic Exercise)    Knee Strengthening (Therapeutic Exercise) sitting;bilateral;LAQ (long arc quad)  x8 reps  -EE     Row Name 09/20/22 1015          Positioning and Restraints    Pre-Treatment Position sitting in chair/recliner  -EE     Post Treatment Position wheelchair  -EE     In Wheelchair sitting;exit alarm on;with SLP  -EE     Row Name 09/20/22 1015          Vital Signs    Pre SpO2 (%) 97  -EE     O2 Delivery Pre Treatment supplemental O2  0.25 L  -EE     Intra SpO2 (%) 91  lowest observed during activity  -EE     O2 Delivery Intra Treatment supplemental O2  0.25 L  -EE     Post SpO2 (%) 95  -EE     O2 Delivery Post Treatment supplemental O2  0.25 L  -EE     Pre Patient Position Sitting  -EE     Intra Patient Position Sitting  immediately after  ambulation/activity  -EE     Post Patient Position Sitting  -EE           User Key  (r) = Recorded By, (t) = Taken By, (c) = Cosigned By    Initials Name Provider Type    EE Rand Patel, PT Physical Therapist              Wound 09/09/22 2200 Right upper arm Laceration (Active)   Dressing Appearance dry;intact 09/20/22 0720   Closure Sutures 09/20/22 0720   Base dressing in place, unable to visualize 09/19/22 1959   Drainage Amount none 09/20/22 0720   Dressing Care foam 09/19/22 1959       Wound 09/09/22 2200 Left midline abdomen Incision (Active)   Dressing Appearance open to air 09/19/22 1959   Closure Approximated 09/20/22 0720   Base clean;closed/resurfaced 09/20/22 0720   Drainage Amount none 09/20/22 0720   Dressing Care open to air 09/20/22 0720     Physical Therapy Education                 Title: PT OT SLP Therapies (Done)     Topic: Physical Therapy (Done)     Point: Mobility training (Done)     Learning Progress Summary           Patient Acceptance, E,TB, VU,NR by EE at 9/20/2022 1044    Acceptance, E,D, VU,DU by DP at 9/19/2022 1212    Acceptance, E,TB,D, VU,DU by JS at 9/17/2022 1450    Acceptance, E,D, VU,DU by DP at 9/16/2022 1533    Acceptance, E, VU by WN at 9/16/2022 0123    Acceptance, E,D, VU,DU by DP at 9/15/2022 1144    Acceptance, E,D, VU,DU by DP at 9/14/2022 1143    Acceptance, E, VU,NR by MG at 9/13/2022 1143    Acceptance, E,TB, VU,NR by EE at 9/10/2022 1518                   Point: Home exercise program (Done)     Learning Progress Summary           Patient Acceptance, E,TB, VU,NR by EE at 9/20/2022 1044    Acceptance, E,D, VU,DU by DP at 9/19/2022 1212    Acceptance, E,TB,D, VU,DU by JS at 9/17/2022 1450    Acceptance, E,D, VU,DU by DP at 9/16/2022 1533    Acceptance, E, VU by WN at 9/16/2022 0123    Acceptance, E,D, VU,DU by DP at 9/15/2022 1144    Acceptance, E,D, VU,DU by DP at 9/14/2022 1143    Acceptance, E, VU,NR by MG at 9/13/2022 1143    Acceptance, E,TB, VU,NR by EE at  9/10/2022 1518                   Point: Body mechanics (Done)     Learning Progress Summary           Patient Acceptance, E,TB, VU,NR by EE at 9/20/2022 1044    Acceptance, E,D, VU,DU by DP at 9/19/2022 1212    Acceptance, E,TB,D, VU,DU by JS at 9/17/2022 1450    Acceptance, E,D, VU,DU by DP at 9/16/2022 1533    Acceptance, E, VU by WN at 9/16/2022 0123    Acceptance, E,D, VU,DU by DP at 9/15/2022 1144    Acceptance, E,D, VU,DU by DP at 9/14/2022 1143    Acceptance, E, VU,NR by MG at 9/13/2022 1143    Acceptance, E,TB, VU,NR by EE at 9/10/2022 1518                   Point: Precautions (Done)     Learning Progress Summary           Patient Acceptance, E,TB, VU,NR by EE at 9/20/2022 1044    Acceptance, E,D, VU,DU by DP at 9/19/2022 1212    Acceptance, E,TB,D, VU,DU by JS at 9/17/2022 1450    Acceptance, E,D, VU,DU by DP at 9/16/2022 1533    Acceptance, E, VU by WN at 9/16/2022 0123    Acceptance, E,D, VU,DU by DP at 9/15/2022 1144    Acceptance, E,D, VU,DU by DP at 9/14/2022 1143    Acceptance, E, VU,NR by MG at 9/13/2022 1143    Acceptance, E, VU by MD at 9/12/2022 1114    Acceptance, E,TB, VU,NR by EE at 9/10/2022 1518                               User Key     Initials Effective Dates Name Provider Type Discipline    JS 06/16/21 -  Stella Lane, PT Physical Therapist PT    EE 06/16/21 -  Rand Patel, PT Physical Therapist PT    MD 06/16/21 -  Ale Garza, PT Physical Therapist PT    MG 05/24/22 -  Elisabeth Bowen, PT Physical Therapist PT    WN 08/23/22 -  Sancho Eugene, RN Registered Nurse Nurse    DP 08/24/21 -  [place], PT Physical Therapist PT                PT Recommendation and Plan    Planned Therapy Interventions (PT): balance training, bed mobility training, gait training, home exercise program, neuromuscular re-education, patient/family education, ROM (range of motion), strengthening, stair training, stretching, transfer training, wheelchair management/propulsion training  Frequency of Treatment  (PT): 60 minutes per session, 5 times per week                     Time Calculation:      PT Charges     Row Name 09/20/22 1044             Time Calculation    Start Time 1000  -EE      Stop Time 1030  -EE      Time Calculation (min) 30 min  -EE      PT Received On 09/20/22  -EE      PT - Next Appointment 09/20/22  -EE              Time Calculation- PT    Total Timed Code Minutes- PT 30 minute(s)  -EE            User Key  (r) = Recorded By, (t) = Taken By, (c) = Cosigned By    Initials Name Provider Type    EE Rand Patel PT Physical Therapist                Therapy Charges for Today     Code Description Service Date Service Provider Modifiers Qty    94810883048 HC PT THER PROC EA 15 MIN 9/20/2022 Rand Patel, PT GP 1    [id number] HC GAIT TRAINING EA 15 MIN 9/20/2022 Rand Patel, PT GP 1              Patient was intermittently wearing a face mask during this therapy encounter. Therapist used appropriate personal protective equipment including mask and gloves.  Mask used was standard procedure mask. Appropriate PPE was worn during the entire therapy session. Hand hygiene was completed before and after therapy session. Patient is not in enhanced droplet precautions.         Rand Patel PT  9/20/2022

## 2022-09-20 NOTE — NURSING NOTE
09/20/22 1550   Colostomy LLQ   Placement Date/Time: 09/09/22 2100   Placed by External Staff?: Other hospital  Location: [facility]Q   Stomal Appliance 1 piece;Changed  (Coloplast sensura nadira)   Stoma Appearance round;red;moist;flush with skin   Peristomal Assessment Clean;Intact   Accessories/Skin Care cleansed with water;skin barrier ring  (extra pieces of barrier ring placed at 3 & 9 oclock in addition to 1 barrier ring placed around stoma)   Stoma Function stool   Stool Color brown   Stool Consistency creamy   Treatment Bag change;Site care     Wound/ostomy - pouch has leaked 2 days in a row, unsure of exactly why. Stoma is in a crease, flush with skin, difficult placement for pouching. Pouch removed and changed today, a barrier ring was not used and the skin fold was not completely flattened which could lead to leakage. A barrier ring was used and extra pieces of barrier ring was applied at 3 & 9 oclock, paste could be used as well to those 2 locations to promote seal. Spouse was present and observed process. Emphasized to flatten the fold out well with pouch application so adequate seal is achieved and to warm applicance prior to application. Plan is to d/c home next week, [place] health [place] One of patient's daughter has learned how to change pouch, another daughter (who is a RN) has not officially done hands on. Will attempt to do hands on learning with daughter if schedules coordinate. Daughter has returned to work so time here will be limited. Extra supplies left in room.

## 2022-09-21 PROCEDURE — 97110 THERAPEUTIC EXERCISES: CPT

## 2022-09-21 PROCEDURE — 97116 GAIT TRAINING THERAPY: CPT

## 2022-09-21 PROCEDURE — 25010000002 ENOXAPARIN PER 10 MG: Performed by: PHYSICAL MEDICINE & REHABILITATION

## 2022-09-21 PROCEDURE — 97535 SELF CARE MNGMENT TRAINING: CPT

## 2022-09-21 PROCEDURE — 97530 THERAPEUTIC ACTIVITIES: CPT

## 2022-09-21 PROCEDURE — 97129 THER IVNTJ 1ST 15 MIN: CPT

## 2022-09-21 PROCEDURE — 97130 THER IVNTJ EA ADDL 15 MIN: CPT

## 2022-09-21 RX ADMIN — LISINOPRIL 5 MG: 5 TABLET ORAL at 07:25

## 2022-09-21 RX ADMIN — Medication 2000 UNITS: at 07:26

## 2022-09-21 RX ADMIN — AMLODIPINE BESYLATE 5 MG: 5 TABLET ORAL at 07:26

## 2022-09-21 RX ADMIN — PANTOPRAZOLE SODIUM 40 MG: 40 TABLET, DELAYED RELEASE ORAL at 05:29

## 2022-09-21 RX ADMIN — ENOXAPARIN SODIUM 40 MG: 100 INJECTION SUBCUTANEOUS at 19:56

## 2022-09-21 RX ADMIN — SERTRALINE 25 MG: 25 TABLET, FILM COATED ORAL at 07:26

## 2022-09-21 RX ADMIN — ATORVASTATIN CALCIUM 40 MG: 20 TABLET, FILM COATED ORAL at 19:56

## 2022-09-21 NOTE — THERAPY TREATMENT NOTE
Inpatient Rehabilitation - Physical Therapy Treatment Note       [facility]     Patient Name: Summer Smith  : 1950  MRN: [id number]    Today's Date: 2022                    Admit Date: 2022      Visit Dx:     ICD-10-CM ICD-9-CM   1. Follow-up exam  Z09 V67.9       Patient Active Problem List   Diagnosis   • Aneurysm (HCC)       Past Medical History:   Diagnosis Date   • COPD (chronic obstructive pulmonary disease) (HCC)    • Elevated cholesterol    • GERD (gastroesophageal reflux disease)        Past Surgical History:   Procedure Laterality Date   • CARDIAC CATHETERIZATION     • COLON SURGERY     • COLONOSCOPY     • SKIN BIOPSY         PT ASSESSMENT (last 12 hours)     IRF PT Evaluation and Treatment     Row Name 22 1015          PT Time and Intention    Document Type daily treatment  -EE     Mode of Treatment physical therapy;individual therapy  -EE     Patient/Family/Caregiver Comments/Observations Pt sitting up in WC, agreeable to PT.  -EE     Row Name 22 1015          General Information    Existing Precautions/Restrictions fall  -EE     Row Name 22 1015          Pain Assessment    Pretreatment Pain Rating 2/10  -EE     Posttreatment Pain Rating 2/10  -EE     Pain Location - abdomen  -EE     Row Name 22 1015          Cognition/Psychosocial    Affect/Mental Status (Cognition) WFL  -EE     Orientation Status (Cognition) oriented x 3  -EE     Follows Commands (Cognition) follows one-step commands  -EE     Personal Safety Interventions fall prevention program maintained;gait belt;muscle strengthening facilitated;nonskid shoes/slippers when out of bed;supervised activity  -EE     Row Name 22 1015          Transfers    Sit-Stand [place] (Transfers) contact guard;verbal cues  -EE     Stand-Sit [place] (Transfers) contact guard;verbal cues  -EE     Row Name 22 1015          Sit-Stand Transfer    Assistive Device (Sit-Stand Transfers) walker,  "front-wheeled  -EE     Row Name 09/21/22 1015          Stand-Sit Transfer    Assistive Device (Stand-Sit Transfers) walker, front-wheeled  -EE     Row Name 09/21/22 1015          Gait/Stairs (Locomotion)    [place] Level (Gait) contact guard;verbal cues  -EE     Assistive Device (Gait) walker, front-wheeled  -EE     Distance in Feet (Gait) 80' x 2, 40' x 1  -EE     Pattern (Gait) step-through  -EE     Deviations/Abnormal Patterns (Gait) base of support, narrow;hunter decreased  -EE     Bilateral Gait Deviations forward flexed posture;heel strike decreased  -EE     Stairs Assessment/Intervention Step up to 4\" step x3 reps in // bars with CGA.  -EE     Row Name 09/21/22 1015          Safety Issues, Functional Mobility    Impairments Affecting Function (Mobility) balance;endurance/activity tolerance;shortness of breath;strength  -EE     Row Name 09/21/22 1015          Balance    Comment, Balance Standing B LE alt step taps to 4\" step with B UE support on // bars x 10 reps, CGA for balance.  -EE     Row Name 09/21/22 1015          Knee (Therapeutic Exercise)    Knee Strengthening (Therapeutic Exercise) bilateral;LAQ (long arc quad);10 repetitions  -EE     Row Name 09/21/22 1015          Positioning and Restraints    Pre-Treatment Position sitting in chair/recliner  -EE     Post Treatment Position chair  -EE     In Chair reclined;call light within reach;encouraged to call for assist;exit alarm on;legs elevated  -EE     Row Name 09/21/22 1015          Vital Signs    Pre SpO2 (%) 95  -EE     O2 Delivery Pre Treatment room air  -EE     Intra SpO2 (%) 88  -EE     O2 Delivery Intra Treatment room air  -EE     Post SpO2 (%) 94  -EE     O2 Delivery Post Treatment room air  -EE     Pre Patient Position Sitting  -EE     Intra Patient Position Sitting  -EE     Post Patient Position Sitting  -EE           User Key  (r) = Recorded By, (t) = Taken By, (c) = Cosigned By    Initials Name Provider Type    Rand Dietrich, PT " Physical Therapist              Wound 09/09/22 2200 Right upper arm Laceration (Active)   Dressing Appearance dry;intact 09/21/22 0705   Closure Sutures 09/21/22 0705   Base clean;pink 09/21/22 0705   Drainage Amount none 09/21/22 0705   Care, Wound cleansed with;sterile normal saline 09/21/22 0705   Dressing Care foam 09/21/22 0705       Wound 09/09/22 2200 Left midline abdomen Incision (Active)   Dressing Appearance open to air 09/21/22 0705   Closure Approximated 09/21/22 0705   Base clean;closed/resurfaced 09/21/22 0705   Drainage Amount none 09/21/22 0705   Care, Wound cleansed with;soap and water 09/21/22 0705   Dressing Care open to air 09/21/22 0705     Physical Therapy Education                 Title: PT OT SLP Therapies (Done)     Topic: Physical Therapy (Done)     Point: Mobility training (Done)     Learning Progress Summary           Patient Acceptance, E,TB, VU,NR by EE at 9/21/2022 1059   Family Acceptance, E, VU by WN at 9/21/2022 0107      Show all documentation for this point (11)                 Point: Home exercise program (Done)     Learning Progress Summary           Patient Acceptance, E,TB, VU,NR by EE at 9/21/2022 1059   Family Acceptance, E, VU by WN at 9/21/2022 0107      Show all documentation for this point (11)                 Point: Body mechanics (Done)     Learning Progress Summary           Patient Acceptance, E,TB, VU,NR by EE at 9/21/2022 1059   Family Acceptance, E, VU by WN at 9/21/2022 0107      Show all documentation for this point (11)                 Point: Precautions (Done)     Learning Progress Summary           Patient Acceptance, E,TB, VU,NR by EE at 9/21/2022 1059   Family Acceptance, E, VU by WN at 9/21/2022 0107      Show all documentation for this point (12)                             User Key     Initials Effective Dates Name Provider Type Discipline    EE 06/16/21 -  Rand Patel PT Physical Therapist PT    WN 08/23/22 -  Sancho Eugene, RN Registered Nurse  Nurse                PT Recommendation and Plan    Planned Therapy Interventions (PT): balance training, bed mobility training, gait training, home exercise program, neuromuscular re-education, patient/family education, ROM (range of motion), strengthening, stair training, stretching, transfer training, wheelchair management/propulsion training  Frequency of Treatment (PT): 60 minutes per session, 5 times per week                     Time Calculation:      PT Charges     Row Name 09/21/22 1324 09/21/22 1059          Time Calculation    Start Time 1230  -EE 1000  -EE     Stop Time 1300  -EE 1030  -EE     Time Calculation (min) 30 min  -EE 30 min  -EE     PT Received On 09/21/22  -EE 09/21/22  -EE     PT - Next Appointment 09/22/22  -EE 09/21/22  -EE            Time Calculation- PT    Total Timed Code Minutes- PT 30 minute(s)  -EE 30 minute(s)  -EE           User Key  (r) = Recorded By, (t) = Taken By, (c) = Cosigned By    Initials Name Provider Type    EE Rand Patel, PT Physical Therapist                Therapy Charges for Today     Code Description Service Date Service Provider Modifiers Qty    87291256242 HC PT THER PROC EA 15 MIN 9/20/2022 Rand Patel, PT GP 1    24122048172 HC GAIT TRAINING EA 15 MIN 9/20/2022 Rand Patel, PT GP 1    67817679401 HC GAIT TRAINING EA 15 MIN 9/21/2022 Rand Patel, PT GP 2    01950882663 HC PT THER PROC EA 15 MIN 9/21/2022 Rand Patel, PT GP 1    19305087335 HC PT THERAPEUTIC ACT EA 15 MIN 9/21/2022 Rand Ptael, PT GP 1              Patient was intermittently wearing a face mask during this therapy encounter. Therapist used appropriate personal protective equipment including mask and gloves.  Mask used was standard procedure mask. Appropriate PPE was worn during the entire therapy session. Hand hygiene was completed before and after therapy session. Patient is not in enhanced droplet precautions.       Rand Patel PT  9/21/2022

## 2022-09-21 NOTE — PLAN OF CARE
Goal Outcome Evaluation:           Progress: improving  Outcome Evaluation: A&OX4. Calm, cooperative, and pleasant. Forgetful at times.  at bedside. Participated fully in therapy. Aneurysm. Abdominal incision. R. elbow has bruises and sutures. Wound care completed. Colostomy to L. lower abdomen. Colostomy bag leaked x2 changed. One-piece extra appliances in her room. Wears glasses. Diet upgrade to regular. 2 sutures removed from R. elbow. No pain meds needed today. Meds whole with water. Continent B&B. Frequency. F/C 9/26 at 11AM. D/C Wed. 9/28.

## 2022-09-21 NOTE — THERAPY TREATMENT NOTE
Inpatient Rehabilitation - Speech Language Pathology Treatment Note    [facility]     Patient Name: Summer Smith  : 1950  MRN: [id number]    Today's Date: 2022                   Admit Date: 2022       Visit Dx:      ICD-10-CM ICD-9-CM   1. Follow-up exam  Z09 V67.9       Patient Active Problem List   Diagnosis   • Aneurysm (HCC)       Past Medical History:   Diagnosis Date   • COPD (chronic obstructive pulmonary disease) (HCC)    • Elevated cholesterol    • GERD (gastroesophageal reflux disease)        Past Surgical History:   Procedure Laterality Date   • CARDIAC CATHETERIZATION     • COLON SURGERY     • COLONOSCOPY     • SKIN BIOPSY         SLP Recommendation and Plan                                                   SLP EVALUATION (last 72 hours)     SLP SLC Evaluation     Row Name 22 1400 22 0900 22 1300 22 1030 22 1300       Communication Assessment/Intervention    Document Type therapy note (daily note)  -SR therapy note (daily note)  -SR therapy note (daily note)  -SR therapy note (daily note)  -SR therapy note (daily note)  -SR    Subjective Information no complaints  -SR no complaints  -SR no complaints  -SR no complaints  -SR no complaints  -SR    Patient Observations alert;cooperative;agree to therapy  -SR alert;cooperative;agree to therapy  -SR alert;cooperative;agree to therapy  -SR alert;cooperative;agree to therapy  -SR alert;cooperative;agree to therapy  -SR    Patient Effort good  -SR good  -SR good  -SR good  -SR good  -SR    Symptoms Noted During/After Treatment none  -SR none  -SR none  -SR none  -SR none  -SR       Pain Scale: Numbers Pre/Post-Treatment    Pretreatment Pain Rating 0/10 - no pain  -SR 0/10 - no pain  -SR 0/10 - no pain  -SR 0/10 - no pain  -SR 0/10 - no pain  -SR    Posttreatment Pain Rating 0/10 - no pain  -SR 0/10 - no pain  -SR 0/10 - no pain  -SR 0/10 - no pain  -SR 0/10 - no pain  -SR    Row Name 22 0900                    Communication Assessment/Intervention    Document Type therapy note (daily note)  -SR        Subjective Information no complaints  -SR        Patient Observations alert;cooperative;agree to therapy  -SR        Patient Effort good  -SR        Symptoms Noted During/After Treatment none  -SR                  Pain Scale: Numbers Pre/Post-Treatment    Pretreatment Pain Rating 0/10 - no pain  -SR        Posttreatment Pain Rating 0/10 - no pain  -SR              User Key  (r) = Recorded By, (t) = Taken By, (c) = Cosigned By    Initials Name Effective Dates    SR Marlen Ochoa, RAGHU 01/12/22 -                    EDUCATION    The patient has been educated in the following areas:       Cognitive Impairment.             SLP GOALS     Row Name 09/21/22 1400 09/21/22 0900 09/20/22 1300       (LTG) Patient will demonstrate progress toward functional swallow for    Diet Texture (Demonstrate progress toward functional swallow) soft ground textures  -SR -- --    Liquid viscosity (Demonstrate progress toward functional swallow) thin liquids  -SR -- --    [place] (Demonstrate progress towards functional swallow) independently (over 90% accuracy)  -SR -- --    Time Frame (Demonstrate progress toward functional swallow) by discharge  -SR -- --    Progress/Outcomes (Demonstrate progress toward functional swallow) goal met  -SR -- --    Comment (Demonstrate progress toward functional swallow) Patient has shown increase in endurance and level of alertness compared to initial evaluation. Recommend upgrade to regular consistency with thin liquids; medication whole with thin liquid.  -SR -- --       Attention Goal 1 (SLP)    Improve Attention by Goal 1 (SLP) attending to task;complete selective attention task;80%;independently (over 90% accuracy)  -SR -- attending to task;complete selective attention task;80%;independently (over 90% accuracy)  -SR    Time Frame (Attention Goal 1, SLP) by discharge  -SR -- by discharge  -SR     Progress/Outcomes (Attention Goal 1, SLP) good progress toward goal  -SR -- good progress toward goal  -SR    Comment (Attention Goal 1, SLP) Patient unscrambled 5-6 letter word given category with 90% accuracy given no cues.  -SR -- Patient unscrambled 8-10 word sentence in order with 70% acc given no cues; 100% acc given min cues.  -SR       Memory Skills Goal 1 (SLP)    Improve Memory Skills Through Goal 1 (SLP) -- recalling unrelated word lists immediately;recalling unrelated word lists with an imposed delay;repeat list in sequential order;listen to a paragraph and answer questions;70%;independently (over 90% accuracy)  -SR --    Time Frame (Memory Skills Goal 1, SLP) -- by discharge  -SR --    Progress/Outcomes (Memory Skills Goal 1, SLP) -- goal partially met  -SR --    Comment (Memory Skills Goal 1, SLP) -- Memory and mental manipulation; patient recalled 4 words in sequential order with 70% acc given no cues; 100% acc given min cues.  -SR --       Organizational Skills Goal 1 (SLP)    Improve Thought Organization Through Goal 1 (SLP) -- -- completing mental manipulation task;80%;independently (over 90% accuracy)  -SR    Time Frame (Thought Organization Skills Goal 1, SLP) -- -- by discharge  -SR    Progress (Thought Organization Skills Goal 1, SLP) -- -- 70%;independently (over 90% accuracy)  -SR    Progress/Outcomes (Thought Organization Skills Goal 1, SLP) -- -- good progress toward goal  -SR       Functional Problem Solving Skills Goal 1 (SLP)    Improve Problem Solving Through Goal 1 (SLP) determine solutions to simple ADL/safety problems;determine solutions to multifactorial problems;sequence steps in a task;complete organization/home management task;70%;independently (over 90% accuracy)  -SR determine solutions to simple ADL/safety problems;determine solutions to multifactorial problems;sequence steps in a task;complete organization/home management task;70%;independently (over 90% accuracy)  -SR  determine solutions to simple ADL/safety problems;determine solutions to multifactorial problems;sequence steps in a task;complete organization/home management task;70%;independently (over 90% accuracy)  -SR    Time Frame (Problem Solving Goal 1, SLP) by discharge  -SR by discharge  -SR by discharge  -SR    Progress/Outcomes (Problem Solving Goal 1, SLP) good progress toward goal;goal partially met  -SR good progress toward goal  -SR good progress toward goal;goal partially met  -SR    Comment (Problem Solving Goal 1, SLP) Patient modified incongruities on a restaurant menu and provided rationale with 80% accuracy given no cues.  -SR Deduction by exclusion; patient followed directions and used process elimination as a strategy to determine final calendar date with 80% acc given no cues.  -SR Patient followed directions and used the clues from the paragraph to organize information on a garden plot (4x3 grid) during a reasoning/logic activity with 50% acc given no cues; 50% acc given min cues. Patient reported that she was able to attend to task much better than yesterday.  -SR       Functional Math Skills Goal 1 (SLP)    Improve Functional Math Skills Through Goal 1 (SLP) complete word problems involving time;complete functional math task;70%;independently (over 90% accuracy)  -SR -- --    Time Frame (Functional Math Skills Goal 1, SLP) by discharge  -SR -- --    Progress/Outcomes (Functional Math Skills Goal 1, SLP) good progress toward goal;goal partially met  -SR -- --    Comment (Functional Math Skills Goal 1, SLP) Patient used visual with restaurant prices to answer functional math-based scenerios with 80% accuracy given no cues; 100% accuracy given min cues.  -SR -- --       Executive Functional Skills Goal 1 (SLP)    Improve Executive Function Skills Goal 1 (SLP) -- demonstrate awareness of deficit;identify strategies, strengths, limitations;time management activity;complex organization/planning activity;home  management activity;70%;independently (over 90% accuracy)  -SR demonstrate awareness of deficit;identify strategies, strengths, limitations;time management activity;complex organization/planning activity;home management activity;70%;independently (over 90% accuracy)  -SR    Time Frame (Executive Function Skills Goal 1, SLP) -- by discharge  -SR by discharge  -SR    Progress/Outcomes (Executive Function Skills Goal 1, SLP) -- good progress toward goal  -SR good progress toward goal  -SR    Comment (Executive Function Skills Goal 1, SLP) -- Arrival/Departure times; patient answered questions about flight times with 20% acc given no cues; 60% acc given min cues; 80% acc given mod cues. Patient demonstrates increased awareness of current deficits with problem-solving/reasoning based activities. Motivated to completed structured therapy activities.  -SR Patient used school schedule to answer questions about time with 63% acc given no cues; 100% acc given min cues.  -SR    Row Name 09/20/22 1030 09/19/22 1300 09/19/22 0800       Attention Goal 1 (SLP)    Improve Attention by Goal 1 (SLP) attending to task;complete selective attention task;80%;independently (over 90% accuracy)  -SR -- attending to task;complete selective attention task;80%;independently (over 90% accuracy)  -SR    Time Frame (Attention Goal 1, SLP) by discharge  -SR -- by discharge  -SR    Progress (Attention Goal 1, SLP) -- -- 80%;with minimal cues (75-90%)  -SR    Progress/Outcomes (Attention Goal 1, SLP) good progress toward goal  -SR -- good progress toward goal  -SR    Comment (Attention Goal 1, SLP) Visual scanning/alternating attention; patient denys continuous line from each picture in alphabetical order with 70% acc given no cues. She self-corrected mistake x3 during activity and used a visual to help her keep track of where she was on the page.  -SR -- --       Functional Problem Solving Skills Goal 1 (SLP)    Improve Problem Solving Through Goal  1 (SLP) determine solutions to simple ADL/safety problems;determine solutions to multifactorial problems;sequence steps in a task;complete organization/home management task;70%;independently (over 90% accuracy)  -SR -- determine solutions to simple ADL/safety problems;determine solutions to multifactorial problems;sequence steps in a task;complete organization/home management task;70%;independently (over 90% accuracy)  -SR    Time Frame (Problem Solving Goal 1, SLP) by discharge  -SR -- by discharge  -SR    Progress/Outcomes (Problem Solving Goal 1, SLP) good progress toward goal;goal partially met  -SR -- good progress toward goal  -SR    Comment (Problem Solving Goal 1, SLP) Patient labeled 5 steps in sequential order with 100% acc given no cues. In a following activity, patient listened to two sentences and labeled content as same/different with 90% acc given no cues.  -SR -- Patient followed directions and used the clues from the paragraph to organize information on chart (2x5 grid) during a reasoning/logic activity with 50% acc given min cues; 70% acc given mod cues. Demonstrated difficulty with working memory, mental manipulation, and task initiation. Required extended time to complete task.  -SR       Functional Math Skills Goal 1 (SLP)    Improve Functional Math Skills Through Goal 1 (SLP) -- complete word problems involving time;complete functional math task;70%;independently (over 90% accuracy)  -SR --    Time Frame (Functional Math Skills Goal 1, SLP) -- by discharge  -SR --    Progress/Outcomes (Functional Math Skills Goal 1, SLP) -- goal ongoing  -SR --    Comment (Functional Math Skills Goal 1, SLP) -- Patient divided coin denomination into separate amounts with 80% acc given min-mod cues. Patient required multiple repetitions of instruction throughout activity.  -SR --       Executive Functional Skills Goal 1 (SLP)    Improve Executive Function Skills Goal 1 (SLP) demonstrate awareness of  deficit;identify strategies, strengths, limitations;time management activity;complex organization/planning activity;home management activity;70%;independently (over 90% accuracy)  -SR demonstrate awareness of deficit;identify strategies, strengths, limitations;time management activity;complex organization/planning activity;home management activity;70%;independently (over 90% accuracy)  -SR --    Time Frame (Executive Function Skills Goal 1, SLP) by discharge  -SR by discharge  -SR --    Progress/Outcomes (Executive Function Skills Goal 1, SLP) goal ongoing  -SR goal ongoing  -SR --    Comment (Executive Function Skills Goal 1, SLP) Patient used daily schedule to answer questions with 75% acc given no cues; 100% acc given min cues.  -SR Patient answered simple word problems involving time/time management with 40% acc given no cues; 80% acc given mod cues. Demonstrated difficulty with thought manipulation.  -SR --       Right Hemisphere Function Goal 1 (SLP)    Improve Right Hemisphere Function Through Goal 1 (SLP) -- complete visuo-spatial activities (visual closure, trail making, mazes;with minimal cues (75-90%)  -SR complete visuo-spatial activities (visual closure, trail making, mazes;with minimal cues (75-90%)  -SR    Time Frame (Right Hemisphere Function Goal 1, SLP) -- by discharge  -SR by discharge  -SR    Progress/Outcomes (Right Hemisphere Function Goal 1, SLP) -- good progress toward goal  -SR good progress toward goal  -SR    Comment (Right Hemisphere Function Goal 1, SLP) -- -- Patient copied information on a clock face given visual with 50% acc given no cues; 100% acc given min-mod cues. SLP provided education regarding visuospatial skills and current difficulties.  -SR          User Key  (r) = Recorded By, (t) = Taken By, (c) = Cosigned By    Initials Name Provider Type    Marlen Becerra SLP Speech and Language Pathologist                            Time Calculation:        Time Calculation- SLP      Row Name 09/21/22 1457 09/21/22 1011          Time Calculation- SLP    SLP Start Time 1330  -SR 0900  -SR     SLP Stop Time 1400  -SR 0930  -SR     SLP Time Calculation (min) 30 min  -SR 30 min  -SR           User Key  (r) = Recorded By, (t) = Taken By, (c) = Cosigned By    Initials Name Provider Type    SR Marlen Ochoa, SLP Speech and Language Pathologist                  Therapy Charges for Today     Code Description Service Date Service Provider Modifiers Qty    50690450846 HC ST DEV OF COGN SKILLS INITIAL 15 MIN 9/20/2022 Marlen Ochoa SLP  1    76401441258 HC ST DEV OF COGN SKILLS EACH ADDT'L 15 MIN 9/20/2022 Marlen Ochoa SLP  3    99137867090 HC ST DEV OF COGN SKILLS INITIAL 15 MIN 9/21/2022 Marlen Ochoa SLP  1    [id number] HC ST DEV OF COGN SKILLS EACH ADDT'L 15 MIN 9/21/2022 Marlen Ochoa SLP  3                           RAGHU Zavala  9/21/2022

## 2022-09-21 NOTE — PROGRESS NOTES
"Nutrition Services    Patient Name:  Summer Smith  YOB: 1950  MRN: [id number]  Admit Date:  9/9/2022      PROGRESS NOTE - REHAB    Comments: Follow up    Encounter Information        Trending Narrative Eating well, 100% many meals; range of %. DC planned for 9/28.      Current Nutrition Orders & Evaluation of Intake       Oral Nutrition     Food Allergies NKFA   Current PO Diet Diet Soft Texture; Ground   Supplement Boost plus TID   PO Evaluation    Trending % PO Intake % x 3 days   Factors Affecting Intake  No factors at this time   --  Anthropometrics         Height   Weight Height: 177.8 cm (70\")  Weight: 115 kg (252 lb 10.4 oz) (09/09/22 2158)   BMI kg/m2 Body mass index is 36.25 kg/m².   Weight trend No weight hx     Physical Findings          Physical Appearance alert, hard of hearing, oriented, overweight, room air, 1-2+ edema   Gastrointestinal Last BM: 9/21, colostomy LLQ   Skin Left midline abdomen incision   --  Labs       Pertinent Labs Reviewed, listed below     Results from last 7 days   Lab Units 09/19/22  1100 09/15/22  0552   SODIUM mmol/L 141 142   POTASSIUM mmol/L 4.5 3.8   CHLORIDE mmol/L 105 103   CO2 mmol/L 25.3 33.0*   BUN mg/dL 10 9   CREATININE mg/dL 0.49* 0.44*   CALCIUM mg/dL 9.0 8.8   GLUCOSE mg/dL 103* 91     Results from last 7 days   Lab Units 09/19/22  1100   HEMOGLOBIN g/dL 9.2*   HEMATOCRIT % 28.8*   WBC 10*3/mm3 7.22     Results from last 7 days   Lab Units 09/19/22  1100 09/15/22  0552   PLATELETS 10*3/mm3 221 326     No results found for: COVID19  No results found for: HGBA1C       Medications           Scheduled Medications amLODIPine, 5 mg, Oral, Q24H  atorvastatin, 40 mg, Oral, Nightly  cholecalciferol, 2,000 Units, Oral, Daily  enoxaparin, 40 mg, Subcutaneous, Nightly  lisinopril, 5 mg, Oral, Q24H  pantoprazole, 40 mg, Oral, Q AM  sertraline, 25 mg, Oral, Daily       Infusions     PRN Medications •  acetaminophen  •  albuterol  •  " HYDROcodone-acetaminophen **OR** [DISCONTINUED] HYDROcodone-acetaminophen     Intervention Goal        Intervention Goal(s) Maintain nutrition status, Reduce/improve symptoms, Disease management/therapy, Tolerate PO , Continue positive trend and PO intake goal %: 75     Nutrition Intervention        RD Action Menu provided, encourage intake, plan of care reviewed       Recommendations        Diet Prescription    Supplement Prescription    Prescription Ordered    --  Monitor/Evaluation        Monitor Per protocol, PO intake, Supplement intake, Pertinent labs, Weight, Skin status, GI status, Symptoms, POC/GOC         Electronically signed by:  Mara Chappell RD  09/21/22 14:03 EDT

## 2022-09-21 NOTE — PROGRESS NOTES
LOS: 12 days   Patient Care Team:  Wale Jacob MD as PCP - General (Family Medicine)      JASBIR MAHMOOD  1950         ADMITTING DIAGNOSIS:  Immobility syndrome    Subjective     She feels she is getting stronger.  No abdominal complaints   tolerating therapies.  Endurance improving.  Edema better.    Objective     Vitals:    09/21/22 1420   BP: 132/78   Pulse: 65   Resp: 18   Temp: 97.5 °F (36.4 °C)   SpO2: 95%       PHYSICAL EXAM:     Awake, alert and 0x3  NAD  Heart: Regular rate and rhythm.  No rub murmur or gallop.  Lungs: Clear to auscultation bilaterally  Abdomen: Colostomy  Normoactive bowel sounds.  Soft and nontender    Ext:  right lateral elbow traumatic laceration healed with 4 sutures in place as well as scab  Moving all 4 extremities- generalized weakness  Trace edema bilateral lower extremities    MEDICATIONS  Scheduled Meds:amLODIPine, 5 mg, Oral, Q24H  atorvastatin, 40 mg, Oral, Nightly  cholecalciferol, 2,000 Units, Oral, Daily  enoxaparin, 40 mg, Subcutaneous, Nightly  lisinopril, 5 mg, Oral, Q24H  pantoprazole, 40 mg, Oral, Q AM  sertraline, 25 mg, Oral, Daily      Continuous Infusions:   PRN Meds:.•  acetaminophen  •  albuterol  •  HYDROcodone-acetaminophen **OR** [DISCONTINUED] HYDROcodone-acetaminophen      RESULTS  No results found for: POCGLU  Results from last 7 days   Lab Units 09/19/22  1100 09/15/22  0552   WBC 10*3/mm3 7.22 5.91   HEMOGLOBIN g/dL 9.2* 9.0*   HEMATOCRIT % 28.8* 28.6*   PLATELETS 10*3/mm3 221 326     Results from last 7 days   Lab Units 09/19/22  1100 09/15/22  0552   SODIUM mmol/L 141 142   POTASSIUM mmol/L 4.5 3.8   CHLORIDE mmol/L 105 103   CO2 mmol/L 25.3 33.0*   BUN mg/dL 10 9   CREATININE mg/dL 0.49* 0.44*   CALCIUM mg/dL 9.0 8.8   GLUCOSE mg/dL 103* 91       Echocardiogram-September 1, 2022  Summary:                 The ejection fraction biplane was calculated at 65%.                            Intravenous contrast was used to enhance endocardial  border definition.                            The left ventricular chamber size, wall thickness, and systolic function are within normal limits. There are                            no regional wall motion abnormalities observed.                            Abnormal left ventricular diastolic filling consistent with impaired relaxation.                            The peak instantaneous gradient of the aortic valve is 28.7 mmHg. The mean gradient of the aortic valve                            is 16 mmHg.                            Mild aortic leaflet calcification.                            Mild aortic stenosis.                            Trace aortic regurgitation is noted.       ASSESSMENT and PLAN    Aneurysm (HCC)    Immobility Syndrome  1.  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 Will teach family colostomy care. Pain controlled. Encouraged to at least take pain meds prior to therapy    3.  HTN- stable on norvasc, lisinopril. Running low.  Decreased lisinopril to 5 mg.   September 16-blood pressure 128-131/60-63    4.  Depression - low dose zoloft    5.  DVT proph- was on lovenox-Will continue 40 mg daily for now    6. Pulm - nasal cannula O2. Wean, keep sats > 90%  September 13-Taper down to 2 L  September 16-sats 92% on room air    7. Right lateral elbow laceration - August 31 - sutured  September 16-as incisions located over a mobile joint, will continue sutures until Monday, September 19.  September 19-laceration not inspected today- will plan to assess on rounds tomorrow for suture removal  September 21-irregular edge to the traumatic laceration but it appears healed.  21 days sutured.  We will remove every other suture today and then the remainder in 2 days if integrity intact.  May have 1 additional suture under a scab.  -will try to get the note on placement of suture    8.  Orthostatic hypotension-she has bilateral lower  extremity edema but will hold on adding diuretic given her orthostatic hypotension.  We will try thigh-high Jobst stockings 15-20 mm compression for both orthostasis as well as edema.  Echocardiogram recently showed ejection fraction 65%.  Recheck CBC and chemistries in a.m.      TEAM [facility] - SEPT 13 - TRANSFERS MOD . GAIT 8 FEET PARALLEL BARS MOD ASSIST. TOILET TRANSFERS MIN MOD. BATH MOD. LBD DEP. UBD MIN. EATING MIN. GROOMING MIN. TOILETING DEP. FATIGUES. USING PUREWICK.   OSTOMY EDUCATION. MEPILEX TO RIGHT ELBOW LACERATION. COGNITION - MILD TO MODERATE COGNITIVE IMPAIRMENT. VISUAL SPATIAL DEFICITS. OSTOMY EDUCATION. ABDOMINAL INCISION HEALING WITH JUST ONE SMALL AREA INFERIORLY WITH SLIGHT DRAINAGE. WILL DISCONTINUE PUREWICK. TO DO TIMED VOIDS.  PULMONARY - WEAN O2 .   ELOS - THREE WEEKS    September 16-Transfers contact-guard min assist.  Ambulated up to 45 feet contact-guard rolling walker.      TE[facility] - SEPT 20 - TRANSFERS CTG MIN. CAR TRANSFERS MOD ASSIST.   GAIT 80 FEET RW CTG.   BATH MIN. LBD MIN UBD SBA. EATING SBA. TOILETING MIN ASSIST.   COGNITION - CUES TO GO STEP BY STEP AND NEEDS TO RE-READ DIRECTIONS. DEFICITS WITH WORKING MEMORY.  BNE (Active)  Att'n. - WNL  Exec. Fx. - Mildly Imp.  Rsng/Jgmnt - WNL  Arith - Mod. Imp.  Visuospatial Skills - Mildly Imp.  Visual Mem. - MIldly Imp.  Verbal Mem. - Mod. Imp., improved with cues  Emot - Pt reported improved mood  OCCASIONAL TEARFUL WITH PASTORAL COUNSELING.  EDUCATION ON COLOSTOMY.  EDEMA BETTER WITH COMPRESSION STOCKINGS.  ELOS - ONE WEEK.    Now admit for comprehensive acute inpatient rehabilitation .  This would be an interdisciplinary program with physical therapy 1.5 hour,  occupational therapy 1.5 hour,  5 days a week.  Rehabilitation nursing for carryover, monitoring of  GI   status, bowel and bladder, and skin  Ongoing physician follow-up.  Weekly team conferences.  Goals are to achieve a level of supervision with  mobility and self-care and  improved activity tolerance.   Rehabilitation prognosis air.  Medical prognosis fair.  Estimated length of stay is approximately 10 days , but is only an estimation.     The patient's functional status and clinical status is unchanged from preadmission assessment and the patient continues appropriate for acute inpatient rehabilitation.  Goal is for home with  Home health   therapies.  Barrier to discharge:  Impaired mobility and self care   - work on  Transfers, balance, gait, ADLS  to overcome.             Hossein Rivers MD      During rounds, used appropriate personal protective equipment including mask and gloves.  Additional gown if indicated.  Mask used was standard procedure mask. Appropriate PPE was worn during the entire visit.  Hand hygiene was completed before and after.

## 2022-09-21 NOTE — THERAPY TREATMENT NOTE
Inpatient Rehabilitation - Occupational Therapy Treatment Note    [facility]     Patient Name: Summer Smith  : 1950  MRN: [id number]    Today's Date: 2022                 Admit Date: 2022         ICD-10-CM ICD-9-CM   1. Follow-up exam  Z09 V67.9       Patient Active Problem List   Diagnosis   • Aneurysm (HCC)       Past Medical History:   Diagnosis Date   • COPD (chronic obstructive pulmonary disease) (HCC)    • Elevated cholesterol    • GERD (gastroesophageal reflux disease)        Past Surgical History:   Procedure Laterality Date   • CARDIAC CATHETERIZATION     • COLON SURGERY     • COLONOSCOPY     • SKIN BIOPSY               IRF OT ASSESSMENT FLOWSHEET (last 12 hours)     IRF OT Evaluation and Treatment     Row Name 22 1149          OT Time and Intention    Document Type daily treatment  -DN     Mode of Treatment occupational therapy  -DN     Patient Effort good  -DN     Comment, Evaluation/Treatment Not Performed pt missed 15 minutes due to ostomy care, already made up this am  -DN     Row Name 22 1149          Pain Assessment    Pretreatment Pain Rating 3/10  -DN     Posttreatment Pain Rating 3/10  -DN     Pain Location incisional  -DN     Pain Location - abdomen  -DN     Row Name 22 1149          Cognition/Psychosocial    Affect/Mental Status (Cognition) WFL  -DN     Cognitive Function attention deficit;memory deficit  -DN     Attention Deficit (Cognition) minimal deficit  -DN     Memory Deficit (Cognition) minimal deficit  -DN     Row Name 22 1149          Bathing    [place] Level (Bathing) bathing skills;minimum assist (75% patient effort);verbal cues;nonverbal cues (demo/gesture)  -DN     Assistive Device (Bathing) long-handled sponge  -DN     Position (Bathing) sink side  -DN     Set-up Assistance (Bathing) obtain supplies  -DN     Row Name 22 1149          Upper Body Dressing    [place] Level (Upper Body Dressing) upper body dressing  skills;doff;don;pull over garment;supervision  -DN     Position (Upper Body Dressing) supported sitting  -DN     Set-up Assistance (Upper Body Dressing) obtain clothing  -DN     Row Name 09/21/22 1149          Lower Body Dressing    [place] Level (Lower Body Dressing) doff;don;pants/bottoms;shoes/slippers;socks;underwear;minimum assist (75% patient effort);nonverbal cues (demo/gesture);verbal cues  -DN     Assistive Device Use (Lower Body Dressing) reacher  -DN     Position (Lower Body Dressing) supported sitting;supported standing  -DN     Set-up Assistance (Lower Body Dressing) obtain clothing  -DN     Row Name 09/21/22 1149          Grooming    [place] Level (Grooming) grooming skills;supervision;verbal cues;nonverbal cues (demo/gesture)  -DN     Position (Grooming) sink side  -DN     Set-up Assistance (Grooming) obtain supplies  -DN     Row Name 09/21/22 1149          Toileting    [place] Level (Toileting) toileting skills;moderate assist (50% patient effort);verbal cues;nonverbal cues (demo/gesture)  -DN     Assistive Device Use (Toileting) grab bar/safety frame  -DN     Position (Toileting) supported standing;supported sitting  -DN     Comment (Toileting) pt dependent with ostomy care, but SBA for clothing mgmt and front hygene  -DN     Row Name 09/21/22 1149          Transfers    [place] Level (Toilet Transfer) contact guard  -DN     Assistive Device (Toilet Transfer) walker, front-wheeled  -DN     Row Name 09/21/22 1149          Toilet Transfer    Type (Toilet Transfer) stand pivot/stand step  -DN     Row Name 09/21/22 1149          Shoulder (Therapeutic Exercise)    Shoulder (Therapeutic Exercise) strengthening exercise;wand exercises  -DN     Shoulder AROM (Therapeutic Exercise) bilateral;10 repetitions;3 sets  -DN     Shoulder Strengthening (Therapeutic Exercise) 1 lb free weight  -DN     Row Name 09/21/22 1149          Elbow/Forearm (Therapeutic Exercise)    Elbow/Forearm (Therapeutic  Exercise) strengthening exercise  -DN     Elbow/Forearm AROM (Therapeutic Exercise) bilateral;10 repetitions;3 sets  -DN     Elbow/Forearm Strengthening (Therapeutic Exercise) 2 lb free weight  -DN     Row Name 09/21/22 1149          Wrist (Therapeutic Exercise)    Wrist (Therapeutic Exercise) strengthening exercise  -DN     Wrist AROM (Therapeutic Exercise) bilateral;10 repetitions;3 sets  -DN     Wrist Strengthening (Therapeutic Exercise) 2 lb free weight  -DN     Row Name 09/21/22 1149          Positioning and Restraints    Pre-Treatment Position sitting in chair/recliner  -DN     Post Treatment Position wheelchair  -DN     In Bed sitting;call light within reach;encouraged to call for assist  -DN           User Key  (r) = Recorded By, (t) = Taken By, (c) = Cosigned By    Initials Name Effective Dates    Sina Currie OT 06/16/21 -                          OT Recommendation and Plan                         Time Calculation:      Time Calculation- OT     Row Name 09/21/22 1045 09/21/22 0830 09/21/22 0800       Time Calculation- OT    OT Start Time 1045  -DN 0830  -DN 0800  -DN    OT Stop Time 1100  -DN 0900  -DN 0815  -DN    OT Time Calculation (min) 15 min  -DN 30 min  -DN 15 min  -DN          User Key  (r) = Recorded By, (t) = Taken By, (c) = Cosigned By    Initials Name Provider Type    Sina Currie OT Occupational Therapist              Therapy Charges for Today     Code Description Service Date Service Provider Modifiers Qty    66597923524 HC OT SELF CARE/MGMT/TRAIN EA 15 MIN 9/20/2022 Sina Walls OT GO 3    85233761453 HC OT THER PROC EA 15 MIN 9/20/2022 Sina Walls OT GO 1    48879026172 HC OT SELF CARE/MGMT/TRAIN EA 15 MIN 9/21/2022 Sina Walls OT GO 3    00249640342 HC OT THER PROC EA 15 MIN 9/21/2022 Sina Walls OT GO 1                   Sina Walls OT  9/21/2022

## 2022-09-21 NOTE — PLAN OF CARE
Goal Outcome Evaluation:  Plan of Care Reviewed With: patient, spouse           Outcome Evaluation: pt educated on safety, up in chair all night, prefers chair over bed, BLE elevated, educated on safety, falls call light usage, and pain management and infection control. all safety precautions in place, spouse at the bedside

## 2022-09-22 LAB
ANION GAP SERPL CALCULATED.3IONS-SCNC: 10 MMOL/L (ref 5–15)
BASOPHILS # BLD AUTO: 0.03 10*3/MM3 (ref 0–0.2)
BASOPHILS NFR BLD AUTO: 0.5 % (ref 0–1.5)
BUN SERPL-MCNC: 10 MG/DL (ref 8–23)
BUN/CREAT SERPL: 16.7 (ref 7–25)
CALCIUM SPEC-SCNC: 9 MG/DL (ref 8.6–10.5)
CHLORIDE SERPL-SCNC: 106 MMOL/L (ref 98–107)
CO2 SERPL-SCNC: 24 MMOL/L (ref 22–29)
CREAT SERPL-MCNC: 0.6 MG/DL (ref 0.57–1)
DEPRECATED RDW RBC AUTO: 44.1 FL (ref 37–54)
EGFRCR SERPLBLD CKD-EPI 2021: 96.7 ML/MIN/1.73
EOSINOPHIL # BLD AUTO: 0.16 10*3/MM3 (ref 0–0.4)
EOSINOPHIL NFR BLD AUTO: 2.6 % (ref 0.3–6.2)
ERYTHROCYTE [DISTWIDTH] IN BLOOD BY AUTOMATED COUNT: 13.3 % (ref 12.3–15.4)
GLUCOSE SERPL-MCNC: 99 MG/DL (ref 65–99)
HCT VFR BLD AUTO: 28.1 % (ref 34–46.6)
HGB BLD-MCNC: 9.5 G/DL (ref 12–15.9)
IMM GRANULOCYTES # BLD AUTO: 0.04 10*3/MM3 (ref 0–0.05)
IMM GRANULOCYTES NFR BLD AUTO: 0.7 % (ref 0–0.5)
LYMPHOCYTES # BLD AUTO: 1.32 10*3/MM3 (ref 0.7–3.1)
LYMPHOCYTES NFR BLD AUTO: 21.5 % (ref 19.6–45.3)
MCH RBC QN AUTO: 29.9 PG (ref 26.6–33)
MCHC RBC AUTO-ENTMCNC: 33.8 G/DL (ref 31.5–35.7)
MCV RBC AUTO: 88.4 FL (ref 79–97)
MONOCYTES # BLD AUTO: 0.66 10*3/MM3 (ref 0.1–0.9)
MONOCYTES NFR BLD AUTO: 10.7 % (ref 5–12)
NEUTROPHILS NFR BLD AUTO: 3.93 10*3/MM3 (ref 1.7–7)
NEUTROPHILS NFR BLD AUTO: 64 % (ref 42.7–76)
NRBC BLD AUTO-RTO: 0.2 /100 WBC (ref 0–0.2)
PLATELET # BLD AUTO: 215 10*3/MM3 (ref 140–450)
PMV BLD AUTO: 11.1 FL (ref 6–12)
POTASSIUM SERPL-SCNC: 3.8 MMOL/L (ref 3.5–5.2)
RBC # BLD AUTO: 3.18 10*6/MM3 (ref 3.77–5.28)
SODIUM SERPL-SCNC: 140 MMOL/L (ref 136–145)
WBC NRBC COR # BLD: 6.14 10*3/MM3 (ref 3.4–10.8)

## 2022-09-22 PROCEDURE — 85025 COMPLETE CBC W/AUTO DIFF WBC: CPT | Performed by: PHYSICAL MEDICINE & REHABILITATION

## 2022-09-22 PROCEDURE — 25010000002 ENOXAPARIN PER 10 MG: Performed by: PHYSICAL MEDICINE & REHABILITATION

## 2022-09-22 PROCEDURE — 97530 THERAPEUTIC ACTIVITIES: CPT

## 2022-09-22 PROCEDURE — 97130 THER IVNTJ EA ADDL 15 MIN: CPT

## 2022-09-22 PROCEDURE — 97110 THERAPEUTIC EXERCISES: CPT

## 2022-09-22 PROCEDURE — 80048 BASIC METABOLIC PNL TOTAL CA: CPT | Performed by: PHYSICAL MEDICINE & REHABILITATION

## 2022-09-22 PROCEDURE — 97129 THER IVNTJ 1ST 15 MIN: CPT

## 2022-09-22 PROCEDURE — 97535 SELF CARE MNGMENT TRAINING: CPT

## 2022-09-22 PROCEDURE — 97116 GAIT TRAINING THERAPY: CPT

## 2022-09-22 RX ADMIN — Medication 2000 UNITS: at 08:07

## 2022-09-22 RX ADMIN — ACETAMINOPHEN 650 MG: 325 TABLET, FILM COATED ORAL at 19:45

## 2022-09-22 RX ADMIN — ATORVASTATIN CALCIUM 40 MG: 20 TABLET, FILM COATED ORAL at 19:45

## 2022-09-22 RX ADMIN — SERTRALINE 25 MG: 25 TABLET, FILM COATED ORAL at 08:07

## 2022-09-22 RX ADMIN — AMLODIPINE BESYLATE 5 MG: 5 TABLET ORAL at 08:07

## 2022-09-22 RX ADMIN — PANTOPRAZOLE SODIUM 40 MG: 40 TABLET, DELAYED RELEASE ORAL at 05:26

## 2022-09-22 RX ADMIN — LISINOPRIL 5 MG: 5 TABLET ORAL at 08:07

## 2022-09-22 RX ADMIN — ENOXAPARIN SODIUM 40 MG: 100 INJECTION SUBCUTANEOUS at 19:46

## 2022-09-22 NOTE — THERAPY PROGRESS REPORT/RE-CERT
Inpatient Rehabilitation - Physical Therapy Progress Note and Treatment Note       [facility]     Patient Name: Summer Smith  : 1950  MRN: [id number]    Today's Date: 2022                    Admit Date: 2022      Visit Dx:     ICD-10-CM ICD-9-CM   1. Follow-up exam  Z09 V67.9       Patient Active Problem List   Diagnosis   • Aneurysm (HCC)       Past Medical History:   Diagnosis Date   • COPD (chronic obstructive pulmonary disease) (HCC)    • Elevated cholesterol    • GERD (gastroesophageal reflux disease)        Past Surgical History:   Procedure Laterality Date   • CARDIAC CATHETERIZATION     • COLON SURGERY     • COLONOSCOPY     • SKIN BIOPSY         PT ASSESSMENT (last 12 hours)     IRF PT Evaluation and Treatment     Row Name 22          PT Time and Intention    Document Type daily treatment;progress note  -DP     Mode of Treatment individual therapy;physical therapy  -DP     Patient/Family/Caregiver Comments/Observations Pt asleep in recliner upon PT arrival. Pt agreeable to PT.  -DP     Row Name 22          General Information    Patient Profile Reviewed yes  -DP     Existing Precautions/Restrictions fall  -DP     Row Name 22          Pain Assessment    Pretreatment Pain Rating 0/10 - no pain  -DP     Posttreatment Pain Rating 0/10 - no pain  -DP     Row Name 22 122          Cognition/Psychosocial    Affect/Mental Status (Cognition) WFL  -DP     Orientation Status (Cognition) oriented x 3  -DP     Follows Commands (Cognition) follows one-step commands;follows two-step commands;over 90% accuracy  -DP     Personal Safety Interventions safety round/check completed;elopement precautions initiated;fall prevention program maintained;gait belt;muscle strengthening facilitated;nonskid shoes/slippers when out of bed;supervised activity  -DP     Row Name 22          Bed Mobility    Comment, (Bed Mobility) NT UIC  -DP     Row Name 22           Transfer Assessment/Treatment    Transfers sit-stand transfer;stand-sit transfer;car transfer;toilet transfer  -DP     Comment, (Transfers) CGA overall, but requires Xin with car transfer  -DP     Row Name 09/22/22 1227          Transfers    Sit-Stand [place] (Transfers) contact guard;verbal cues  -DP     Stand-Sit [place] (Transfers) contact guard;verbal cues  -DP     [place] Level (Toilet Transfer) contact guard  -DP     Assistive Device (Toilet Transfer) walker, front-wheeled  -DP     Row Name 09/22/22 1227          Sit-Stand Transfer    Assistive Device (Sit-Stand Transfers) walker, front-wheeled  -DP     Row Name 09/22/22 1227          Stand-Sit Transfer    Assistive Device (Stand-Sit Transfers) walker, front-wheeled  -DP     Row Name 09/22/22 1227          Toilet Transfer    Type (Toilet Transfer) stand pivot/stand step  -DP     Row Name 09/22/22 1227          Car Transfer    Type (Car Transfer) sit-stand;stand-sit  -DP     [place] Level (Car Transfer) minimum assist (75% patient effort)  -DP     Assistive Device (Car Transfer) walker, front-wheeled  -DP     Comment, (Car Transfer) Xin with LE's and VC for hand placement  -DP     Row Name 09/22/22 1227          Gait/Stairs (Locomotion)    [place] Level (Gait) contact guard;verbal cues  -DP     Assistive Device (Gait) walker, front-wheeled  -DP     Distance in Feet (Gait) 80'x2, 10'x3  -DP     Pattern (Gait) step-through  -DP     Deviations/Abnormal Patterns (Gait) base of support, narrow;hunter decreased  -DP     Bilateral Gait Deviations forward flexed posture;heel strike decreased  -DP     Row Name 09/22/22 1227          Safety Issues, Functional Mobility    Impairments Affecting Function (Mobility) balance;endurance/activity tolerance;shortness of breath;strength  -DP     Row Name 09/22/22 1227          Hip (Therapeutic Exercise)    Hip (Therapeutic Exercise) AROM (active range of motion)  -DP     Hip AROM (Therapeutic  Exercise) bilateral;flexion;5 repetitions;other (see comments)  2 sets  -DP     Row Name 09/22/22 1227          Knee (Therapeutic Exercise)    Knee (Therapeutic Exercise) strengthening exercise  -DP     Knee Strengthening (Therapeutic Exercise) bilateral;LAQ (long arc quad);1 lb free weight;hamstring curls;red;resistance band;10 repetitions;2 sets  -DP     Row Name 09/22/22 1227          Positioning and Restraints    Pre-Treatment Position sitting in chair/recliner  -DP     Post Treatment Position wheelchair  -DP     In Wheelchair sitting;with SLP  -DP     Row Name 09/22/22 1227          Weekly Progress Summary (PT)    Weekly Progress Summary (PT) Pt is progressing well with IP rehab PT. She has currently, met short term transfer and short and long term ambulation goals and is now progressing toward remaining STG and LTG. Pt has decreased from 2L Nc to 0 L NC during ambulation and is able to stay between 90-94%. Pt has ambulated up to 80 feet with FWW and is CGA. Planning to work on bed mobility and car transfers this week to meet rehab goals. Pt expected to improve with functional mobility with continued skilled rehab PT services.  -DP     Row Name 09/22/22 1227          IRF PT Goals    Bed Mobility Goal Selection (PT-IRF) bed mobility, PT goal 1;bed mobility, PT goal 2  -DP     Transfer Goal Selection (PT-IRF) transfers, PT goal 1;transfers, PT goal 2;transfers, PT goal 3  -DP     Gait (Walking Locomotion) Goal Selection (PT-IRF) gait, PT goal 1;gait, PT goal 2  -DP     Wheelchair Locomotion Goal Selection (PT-IRF) wheelchair locomotion, PT goal 1  -DP     Row Name 09/22/22 1227          Bed Mobility Goal 1 (PT-IRF)    Activity/Assistive Device (Bed Mobility Goal 1, PT-IRF) sit to supine/supine to sit  -DP     [place] Level (Bed Mobility Goal 1, PT-IRF) minimum assist (75% or more patient effort)  -DP     Time Frame (Bed Mobility Goal 1, PT-IRF) 2 weeks;short-term goal (STG)  -DP     Progress/Outcomes (Bed  Mobility Goal 1, PT-IRF) goal ongoing  -DP     Row Name 09/22/22 1227          Bed Mobility Goal 2 (PT-IRF)    Activity/Assistive Device (Bed Mobility Goal 2, PT-IRF) sit to supine/supine to sit;rolling to left;rolling to right  -DP     [place] Level (Bed Mobility Goal 2, PT-IRF) independent  -DP     Time Frame (Bed Mobility Goal 2, PT-IRF) 4 weeks;long-term goal (LTG)  -DP     Progress/Outcomes (Bed Mobility Goal 2, PT-IRF) goal ongoing  -DP     Row Name 09/22/22 1227          Transfer Goal 1 (PT-IRF)    Activity/Assistive Device (Transfer Goal 1, PT-IRF) sit-to-stand/stand-to-sit;bed-to-chair/chair-to-bed  -DP     [place] Level (Transfer Goal 1, PT-IRF) moderate assist (50-74% patient effort)  -DP     Time Frame (Transfer Goal 1, PT-IRF) 2 weeks;short-term goal (STG)  -DP     Progress/Outcomes (Transfer Goal 1, PT-IRF) goal met  -DP     Row Name 09/22/22 1227          Transfer Goal 2 (PT-IRF)    Activity/Assistive Device (Transfer Goal 2, PT-IRF) sit-to-stand/stand-to-sit;bed-to-chair/chair-to-bed  -DP     [place] Level (Transfer Goal 2, PT-IRF) contact guard required  -DP     Time Frame (Transfer Goal 2, PT-IRF) 4 weeks;long-term goal (LTG)  -DP     Progress/Outcomes (Transfer Goal 2, PT-IRF) goal met  -DP     Row Name 09/22/22 1227          Transfer Goal 3 (PT-IRF)    Activity/Assistive Device (Transfer Goal 3, PT-IRF) car transfer  -DP     [place] Level (Transfer Goal 3, PT-IRF) contact guard required  -DP     Time Frame (Transfer Goal 3, PT-IRF) 4 weeks;long-term goal (LTG)  -DP     Progress/Outcomes (Transfer Goal 3, PT-IRF) goal ongoing  -DP     Row Name 09/22/22 1227          Gait/Walking Locomotion Goal 1 (PT-IRF)    Activity/Assistive Device (Gait/Walking Locomotion Goal 1, PT-IRF) gait (walking locomotion);other (see comments)  -DP     Gait/Walking Locomotion Distance Goal 1 (PT-IRF) 8'  -DP     [place] Level (Gait/Walking Locomotion Goal 1, PT-IRF) minimum assist (75% or more  patient effort)  -DP     Time Frame (Gait/Walking Locomotion Goal 1, PT-IRF) 2 weeks;short-term goal (STG)  -DP     Progress/Outcomes (Gait/Walking Locomotion Goal 1, PT-IRF) goal met  -DP     Row Name 09/22/22 1227          Gait/Walking Locomotion Goal 2 (PT-IRF)    Activity/Assistive Device (Gait/Walking Locomotion Goal 2, PT-IRF) gait (walking locomotion);walker, rolling  -DP     Gait/Walking Locomotion Distance Goal 2 (PT-IRF) 80'  -DP     [place] Level (Gait/Walking Locomotion Goal 2, PT-IRF) contact guard required  -DP     Time Frame (Gait/Walking Locomotion Goal 2, PT-IRF) 4 weeks;long-term goal (LTG)  -DP     Progress/Outcomes (Gait/Walking Locomotion Goal 2, PT-IRF) goal met  -DP     Row Name 09/22/22 1227          Wheelchair Locomotion Goal 1 (PT-IRF)    Activity (Wheelchair Locomotion Goal 1, PT-IRF) forward propulsion;steering;turning  -DP     [place] Level (Wheelchair Locomotion Goal 1, PT-IRF) modified independence  -DP     Distance Goal 1 (Wheelchair Locomotion, PT-IRF) 80' x 1  -DP     Time Frame (Wheelchair Locomotion Goal 1, PT-IRF) 2 weeks;short-term goal (STG)  -DP     Progress/Outcomes (Wheelchair Locomotion Goal 1, PT-IRF) goal no longer appropriate  -DP           User Key  (r) = Recorded By, (t) = Taken By, (c) = Cosigned By    Initials Name Provider Type    DP [place], PT Physical Therapist              Wound 09/09/22 2200 Right upper arm Laceration (Active)   Dressing Appearance dry;intact;open to air 09/21/22 2203   Closure Sutures 09/21/22 2203   Base clean;dry;scab 09/22/22 0800   Drainage Amount none 09/22/22 0800   Dressing Care open to air 09/22/22 0800   Adhesive Closure Strips Applied 09/21/22 1900   Number of Adhesive Closure Strips 2 09/21/22 1900   Number of Sutures Removed 2 09/21/22 1900       Wound 09/09/22 2200 Left midline abdomen Incision (Active)   Dressing Appearance open to air 09/21/22 2203   Closure Approximated 09/22/22 0800   Base  clean;closed/resurfaced 09/22/22 0800   Drainage Amount none 09/22/22 0800   Dressing Care open to air 09/21/22 2203     Physical Therapy Education                 Title: PT OT SLP Therapies (Done)     Topic: Physical Therapy (Done)     Point: Mobility training (Done)     Learning Progress Summary           Patient Acceptance, E,D, VU,DU by DP at 9/22/2022 1518   Family Acceptance, E, VU by WN at 9/21/2022 2212   Significant Other Acceptance, E, VU by WN at 9/21/2022 2212      Show all documentation for this point (14)                 Point: Home exercise program (Done)     Learning Progress Summary           Patient Acceptance, E,D, VU,DU by DP at 9/22/2022 1518   Family Acceptance, E, VU by WN at 9/21/2022 2212   Significant Other Acceptance, E, VU by WN at 9/21/2022 2212      Show all documentation for this point (14)                 Point: Body mechanics (Done)     Learning Progress Summary           Patient Acceptance, E,D, VU,DU by DP at 9/22/2022 1518   Family Acceptance, E, VU by WN at 9/21/2022 2212   Significant Other Acceptance, E, VU by WN at 9/21/2022 2212      Show all documentation for this point (14)                 Point: Precautions (Done)     Learning Progress Summary           Patient Acceptance, E,D, VU,DU by DP at 9/22/2022 1518   Family Acceptance, E, VU by WN at 9/21/2022 2212   Significant Other Acceptance, E, VU by WN at 9/21/2022 2212      Show all documentation for this point (15)                             User Key     Initials Effective Dates Name Provider Type Discipline    ROSETTE 08/23/22 -  Sancho Eugene RN Registered Nurse Nurse    DP 08/24/21 -  [place], PT Physical Therapist PT                PT Recommendation and Plan                          Time Calculation:      PT Charges     Row Name 09/22/22 1520 09/22/22 1518          Time Calculation    Start Time 1230  -DP 1000  -DP     Stop Time 1300  -DP 1030  -DP     Time Calculation (min) 30 min  -DP 30 min  -DP     PT  Received On -- 09/22/22  -DP     PT - Next Appointment -- 09/23/22  -DP     PT Goal Re-Cert Due Date -- 09/29/22  -DP            Time Calculation- PT    Total Timed Code Minutes- PT 30 minute(s)  -DP 30 minute(s)  -DP           User Key  (r) = Recorded By, (t) = Taken By, (c) = Cosigned By    Initials Name Provider Type    DP Preston Hills PT Physical Therapist                Therapy Charges for Today     Code Description Service Date Service Provider Modifiers Qty    20088422726  GAIT TRAINING EA 15 MIN 9/22/2022 Preston [place], PT GP 1    [id number]  PT THERAPEUTIC ACT EA 15 MIN 9/22/2022 Preston Hills PT GP 2    [id number]  PT THER PROC EA 15 MIN 9/22/2022 Preston Hills PT GP 1              Patient was wearing a face mask during this therapy encounter. Therapist used appropriate personal protective equipment including mask and gloves.  Mask used was standard procedure mask. Appropriate PPE was worn during the entire therapy session. Hand hygiene was completed before and after therapy session. Patient is not in enhanced droplet precautions.         Preston Hills PT  9/22/2022

## 2022-09-22 NOTE — PLAN OF CARE
Goal Outcome Evaluation:  Plan of Care Reviewed With: patient        Progress: improving  Outcome Evaluation: Mrs. Smith is alert and oriented pleasant and cooperative, continues getting stronger ambulating well with walker with CGA, abdominal inc clean and intact SPRING, colostomy bag changed today with wound ostomy nurse, remaining sutures taken out of right elbow as ordered and steri strips applied, no complaints of pain or distress this shift, continent of bladder,  at bedside at this time.

## 2022-09-22 NOTE — PROGRESS NOTES
Inpatient Rehabilitation Plan of Care Note    Plan of Care  Care Plan Reviewed - No updates at this time.    Pain    Performed Intervention(s)  Pain medication as requested and available  Repositioning and wound care      Sphincter Control    Performed Intervention(s)  Incontinance products and elo care  answer call light promptly  WCON consult  skin/stoma assessment      Psychosocial    Performed Intervention(s)  supportive family  Pt given opportunity to express concerns/feelings      Safety    Performed Intervention(s)  Personal items and call light w/in reach  falls precaution  hourly rounding    Signed by: Princess Raza RN

## 2022-09-22 NOTE — PROGRESS NOTES
LOS: 13 days   Patient Care Team:  Wale Jacob MD as PCP - General (Family Medicine)      JASBIR MAHMOOD  1950         ADMITTING DIAGNOSIS:  Immobility syndrome    Subjective     She feels she is getting stronger.  No abdominal complaints   tolerating therapies.  Endurance improving.  Edema better.    Objective     Vitals:    09/22/22 1241   BP: 132/70   Pulse: 75   Resp: 18   Temp: 98.1 °F (36.7 °C)   SpO2: 95%       PHYSICAL EXAM:     Awake, alert and 0x3  NAD  Heart: Regular rate and rhythm.  No rub murmur or gallop.  Lungs: Clear to auscultation bilaterally  Abdomen: Colostomy  Normoactive bowel sounds.  Soft and nontender    Ext:  right lateral elbow traumatic laceration healed   Moving all 4 extremities- generalized weakness  Trace edema bilateral lower extremities    MEDICATIONS  Scheduled Meds:amLODIPine, 5 mg, Oral, Q24H  atorvastatin, 40 mg, Oral, Nightly  cholecalciferol, 2,000 Units, Oral, Daily  enoxaparin, 40 mg, Subcutaneous, Nightly  lisinopril, 5 mg, Oral, Q24H  pantoprazole, 40 mg, Oral, Q AM  sertraline, 25 mg, Oral, Daily      Continuous Infusions:   PRN Meds:.•  acetaminophen  •  albuterol  •  HYDROcodone-acetaminophen **OR** [DISCONTINUED] HYDROcodone-acetaminophen      RESULTS  No results found for: POCGLU  Results from last 7 days   Lab Units 09/22/22  0950 09/19/22  1100   WBC 10*3/mm3 6.14 7.22   HEMOGLOBIN g/dL 9.5* 9.2*   HEMATOCRIT % 28.1* 28.8*   PLATELETS 10*3/mm3 215 221     Results from last 7 days   Lab Units 09/22/22  0950 09/19/22  1100   SODIUM mmol/L 140 141   POTASSIUM mmol/L 3.8 4.5   CHLORIDE mmol/L 106 105   CO2 mmol/L 24.0 25.3   BUN mg/dL 10 10   CREATININE mg/dL 0.60 0.49*   CALCIUM mg/dL 9.0 9.0   GLUCOSE mg/dL 99 103*       Echocardiogram-September 1, 2022  Summary:                 The ejection fraction biplane was calculated at 65%.                            Intravenous contrast was used to enhance endocardial border definition.                             The left ventricular chamber size, wall thickness, and systolic function are within normal limits. There are                            no regional wall motion abnormalities observed.                            Abnormal left ventricular diastolic filling consistent with impaired relaxation.                            The peak instantaneous gradient of the aortic valve is 28.7 mmHg. The mean gradient of the aortic valve                            is 16 mmHg.                            Mild aortic leaflet calcification.                            Mild aortic stenosis.                            Trace aortic regurgitation is noted.       ASSESSMENT and PLAN    Aneurysm (HCC)    Immobility Syndrome  1.  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 Will teach family colostomy care. Pain controlled. Encouraged to at least take pain meds prior to therapy    3.  HTN- stable on norvasc, lisinopril. Running low.  Decreased lisinopril to 5 mg.   September 16-blood pressure 128-131/60-63      4.  Depression - low dose zoloft    5.  DVT proph- was on lovenox-Will continue 40 mg daily for now    6. Pulm - nasal cannula O2. Wean, keep sats > 90%  September 13-Taper down to 2 L  September 16-sats 92% on room air    7. Right lateral elbow laceration - August 31 - sutured  September 16-as incisions located over a mobile joint, will continue sutures until Monday, September 19.  September 19-laceration not inspected today- will plan to assess on rounds tomorrow for suture removal  September 21-irregular edge to the traumatic laceration but it appears healed.  21 days sutured.  We will remove every other suture today and then the remainder in 2 days if integrity intact.  May have 1 additional suture under a scab.  -will try to get the note on placement of suture  September 22-remove remaining sutures    8.  Orthostatic hypotension-she has bilateral lower  extremity edema but will hold on adding diuretic given her orthostatic hypotension.  We will try thigh-high Jobst stockings 15-20 mm compression for both orthostasis as well as edema.  Echocardiogram recently showed ejection fraction 65%.     September 22-edema improved-continue compression stocking    TEAM [facility] - SEPT 13 - TRANSFERS MOD . GAIT 8 FEET PARALLEL BARS MOD ASSIST. TOILET TRANSFERS MIN MOD. BATH MOD. LBD DEP. UBD MIN. EATING MIN. GROOMING MIN. TOILETING DEP. FATIGUES. USING PUREWICK.   OSTOMY EDUCATION. MEPILEX TO RIGHT ELBOW LACERATION. COGNITION - MILD TO MODERATE COGNITIVE IMPAIRMENT. VISUAL SPATIAL DEFICITS. OSTOMY EDUCATION. ABDOMINAL INCISION HEALING WITH JUST ONE SMALL AREA INFERIORLY WITH SLIGHT DRAINAGE. WILL DISCONTINUE PUREWICK. TO DO TIMED VOIDS.  PULMONARY - WEAN O2 .   ELOS - THREE WEEKS    September 16-Transfers contact-guard min assist.  Ambulated up to 45 feet contact-guard rolling walker.      TEAM [facility] - SEPT 20 - TRANSFERS CTG MIN. CAR TRANSFERS MOD ASSIST.   GAIT 80 FEET RW CTG.   BATH MIN. LBD MIN UBD SBA. EATING SBA. TOILETING MIN ASSIST.   COGNITION - CUES TO GO STEP BY STEP AND NEEDS TO RE-READ DIRECTIONS. DEFICITS WITH WORKING MEMORY.  BNE (Active)  Att'n. - WNL  Exec. Fx. - Mildly Imp.  Rsng/Jgmnt - WNL  Arith - Mod. Imp.  Visuospatial Skills - Mildly Imp.  Visual Mem. - MIldly Imp.  Verbal Mem. - Mod. Imp., improved with cues  Emot - Pt reported improved mood  OCCASIONAL TEARFUL WITH PASTORAL COUNSELING.  EDUCATION ON COLOSTOMY.  EDEMA BETTER WITH COMPRESSION STOCKINGS.  ELOS - ONE WEEK.    Now admit for comprehensive acute inpatient rehabilitation .  This would be an interdisciplinary program with physical therapy 1.5 hour,  occupational therapy 1.5 hour,  5 days a week.  Rehabilitation nursing for carryover, monitoring of  GI   status, bowel and bladder, and skin  Ongoing physician follow-up.  Weekly team conferences.  Goals are to achieve a level of supervision with   mobility and self-care and improved activity tolerance.   Rehabilitation prognosis air.  Medical prognosis fair.  Estimated length of stay is approximately 10 days , but is only an estimation.     The patient's functional status and clinical status is unchanged from preadmission assessment and the patient continues appropriate for acute inpatient rehabilitation.  Goal is for home with  Home health   therapies.  Barrier to discharge:  Impaired mobility and self care   - work on  Transfers, balance, gait, ADLS  to overcome.             Hossein Rivers MD      During rounds, used appropriate personal protective equipment including mask and gloves.  Additional gown if indicated.  Mask used was standard procedure mask. Appropriate PPE was worn during the entire visit.  Hand hygiene was completed before and after.

## 2022-09-22 NOTE — THERAPY TREATMENT NOTE
Inpatient Rehabilitation - Speech Language Pathology Treatment Note    [facility]     Patient Name: Summer Smith  : 1950  MRN: [id number]    Today's Date: 2022                   Admit Date: 2022       Visit Dx:      ICD-10-CM ICD-9-CM   1. Follow-up exam  Z09 V67.9       Patient Active Problem List   Diagnosis   • Aneurysm (HCC)       Past Medical History:   Diagnosis Date   • COPD (chronic obstructive pulmonary disease) (HCC)    • Elevated cholesterol    • GERD (gastroesophageal reflux disease)        Past Surgical History:   Procedure Laterality Date   • CARDIAC CATHETERIZATION     • COLON SURGERY     • COLONOSCOPY     • SKIN BIOPSY         SLP Recommendation and Plan                                                   SLP EVALUATION (last 72 hours)     SLP SLC Evaluation     Row Name 22 1300 22 0922 1400 22 0922 1300       Communication Assessment/Intervention    Document Type therapy note (daily note)  -SR therapy note (daily note)  -SR therapy note (daily note)  -SR therapy note (daily note)  -SR therapy note (daily note)  -SR    Subjective Information no complaints  -SR no complaints  -SR no complaints  -SR no complaints  -SR no complaints  -SR    Patient Observations alert;cooperative;agree to therapy  -SR alert;cooperative;agree to therapy  -SR alert;cooperative;agree to therapy  -SR alert;cooperative;agree to therapy  -SR alert;cooperative;agree to therapy  -SR    Patient Effort good  -SR good  -SR good  -SR good  -SR good  -SR    Symptoms Noted During/After Treatment none  -SR none  -SR none  -SR none  -SR none  -SR       Pain Scale: Numbers Pre/Post-Treatment    Pretreatment Pain Rating 0/10 - no pain  -SR 0/10 - no pain  -SR 0/10 - no pain  -SR 0/10 - no pain  -SR 0/10 - no pain  -SR    Posttreatment Pain Rating 0/10 - no pain  -SR 0/10 - no pain  -SR 0/10 - no pain  -SR 0/10 - no pain  -SR 0/10 - no pain  -SR    Row Name 22 1030                    Communication Assessment/Intervention    Document Type therapy note (daily note)  -SR        Subjective Information no complaints  -SR        Patient Observations alert;cooperative;agree to therapy  -SR        Patient Effort good  -SR        Symptoms Noted During/After Treatment none  -SR                  Pain Scale: Numbers Pre/Post-Treatment    Pretreatment Pain Rating 0/10 - no pain  -SR        Posttreatment Pain Rating 0/10 - no pain  -SR              User Key  (r) = Recorded By, (t) = Taken By, (c) = Cosigned By    Initials Name Effective Dates    SR Marlen Ochoa, RAGHU 01/12/22 -                    EDUCATION    The patient has been educated in the following areas:       Cognitive Impairment.             SLP GOALS     Row Name 09/22/22 1300 09/22/22 0900 09/21/22 1400       (LTG) Patient will demonstrate progress toward functional swallow for    Diet Texture (Demonstrate progress toward functional swallow) -- -- soft ground textures  -SR    Liquid viscosity (Demonstrate progress toward functional swallow) -- -- thin liquids  -SR    [place] (Demonstrate progress towards functional swallow) -- -- independently (over 90% accuracy)  -SR    Time Frame (Demonstrate progress toward functional swallow) -- -- by discharge  -SR    Progress/Outcomes (Demonstrate progress toward functional swallow) -- -- goal met  -SR    Comment (Demonstrate progress toward functional swallow) -- -- Patient has shown increase in endurance and level of alertness compared to initial evaluation. Recommend upgrade to regular consistency with thin liquids; medication whole with thin liquid.  -SR       Attention Goal 1 (SLP)    Improve Attention by Goal 1 (SLP) attending to task;complete selective attention task;80%;independently (over 90% accuracy)  -SR attending to task;complete selective attention task;80%;independently (over 90% accuracy)  -SR attending to task;complete selective attention task;80%;independently (over 90%  accuracy)  -SR    Time Frame (Attention Goal 1, SLP) by discharge  -SR by discharge  -SR by discharge  -SR    Progress (Attention Goal 1, SLP) 80%;independently (over 90% accuracy)  -SR -- --    Progress/Outcomes (Attention Goal 1, SLP) good progress toward goal  -SR good progress toward goal  -SR good progress toward goal  -SR    Comment (Attention Goal 1, SLP) -- Visual scanning; alternating attn; patient denys continuous line from letter to number in sequential order with 70% accuracy given no cues; 100% acc given min cues.  -SR Patient unscrambled 5-6 letter word given category with 90% accuracy given no cues.  -SR       Functional Problem Solving Skills Goal 1 (SLP)    Improve Problem Solving Through Goal 1 (SLP) determine solutions to simple ADL/safety problems;determine solutions to multifactorial problems;sequence steps in a task;complete organization/home management task;70%;independently (over 90% accuracy)  -SR -- determine solutions to simple ADL/safety problems;determine solutions to multifactorial problems;sequence steps in a task;complete organization/home management task;70%;independently (over 90% accuracy)  -SR    Time Frame (Problem Solving Goal 1, SLP) by discharge  -SR -- by discharge  -SR    Progress/Outcomes (Problem Solving Goal 1, SLP) good progress toward goal  -SR -- good progress toward goal;goal partially met  -SR    Comment (Problem Solving Goal 1, SLP) Patient followed directions and used process of elimination to determine timeline for a schedule with 63% accuracy given no cues; 100% acc given min cues  -SR -- Patient modified incongruities on a restaurant menu and provided rationale with 80% accuracy given no cues.  -SR       Functional Math Skills Goal 1 (SLP)    Improve Functional Math Skills Through Goal 1 (SLP) complete word problems involving time;complete functional math task;70%;independently (over 90% accuracy)  -SR complete word problems involving time;complete functional  math task;70%;independently (over 90% accuracy)  -SR complete word problems involving time;complete functional math task;70%;independently (over 90% accuracy)  -SR    Time Frame (Functional Math Skills Goal 1, SLP) by discharge  -SR by discharge  -SR by discharge  -SR    Progress/Outcomes (Functional Math Skills Goal 1, SLP) good progress toward goal  -SR good progress toward goal;goal partially met  -SR good progress toward goal;goal partially met  -SR    Comment (Functional Math Skills Goal 1, SLP) Patient completed checkbook balance activity given deposit/transaction amounts with 40% acc given no cues; 80% accuracy given mod cues.  -SR Patient answered functional word problems involving time with 50% acc given no cues; 100% acc given min-mod cues. Cues were provided for attn to detail.  -SR Patient used visual with restaurant prices to answer functional math-based [place] with 80% accuracy given no cues; 100% accuracy given min cues.  -SR       Executive Functional Skills Goal 1 (SLP)    Improve Executive Function Skills Goal 1 (SLP) demonstrate awareness of deficit;identify strategies, strengths, limitations;time management activity;complex organization/planning activity;home management activity;70%;independently (over 90% accuracy)  -SR demonstrate awareness of deficit;identify strategies, strengths, limitations;time management activity;complex organization/planning activity;home management activity;70%;independently (over 90% accuracy)  -SR --    Time Frame (Executive Function Skills Goal 1, SLP) by discharge  -SR by discharge  -SR --    Progress (Executive Function Skills Goal 1, SLP) 80%;independently (over 90% accuracy)  -SR -- --    Progress/Outcomes (Executive Function Skills Goal 1, SLP) good progress toward goal  -SR good progress toward goal  -SR --    Comment (Executive Function Skills Goal 1, SLP) -- Patient used information to fill out monthly calendar with daily events; 90% accuracy given no  cues. Patient crossed off stimulus items as she went in order to keep track of the information.  -SR --    Row Name 09/21/22 0900 09/20/22 1300 09/20/22 1030       Attention Goal 1 (SLP)    Improve Attention by Goal 1 (SLP) -- attending to task;complete selective attention task;80%;independently (over 90% accuracy)  -SR attending to task;complete selective attention task;80%;independently (over 90% accuracy)  -SR    Time Frame (Attention Goal 1, SLP) -- by discharge  -SR by discharge  -SR    Progress/Outcomes (Attention Goal 1, SLP) -- good progress toward goal  -SR good progress toward goal  -SR    Comment (Attention Goal 1, SLP) -- Patient unscrambled 8-10 word sentence in order with 70% acc given no cues; 100% acc given min cues.  -SR Visual scanning/alternating attention; patient denys continuous line from each picture in alphabetical order with 70% acc given no cues. She self-corrected mistake x3 during activity and used a visual to help her keep track of where she was on the page.  -SR       Memory Skills Goal 1 (SLP)    Improve Memory Skills Through Goal 1 (SLP) recalling unrelated word lists immediately;recalling unrelated word lists with an imposed delay;repeat list in sequential order;listen to a paragraph and answer questions;70%;independently (over 90% accuracy)  -SR -- --    Time Frame (Memory Skills Goal 1, SLP) by discharge  -SR -- --    Progress/Outcomes (Memory Skills Goal 1, SLP) goal partially met  -SR -- --    Comment (Memory Skills Goal 1, SLP) Memory and mental manipulation; patient recalled 4 words in sequential order with 70% acc given no cues; 100% acc given min cues.  -SR -- --       Organizational Skills Goal 1 (SLP)    Improve Thought Organization Through Goal 1 (SLP) -- completing mental manipulation task;80%;independently (over 90% accuracy)  -SR --    Time Frame (Thought Organization Skills Goal 1, SLP) -- by discharge  -SR --    Progress (Thought Organization Skills Goal 1, SLP) --  70%;independently (over 90% accuracy)  -SR --    Progress/Outcomes (Thought Organization Skills Goal 1, SLP) -- good progress toward goal  -SR --       Functional Problem Solving Skills Goal 1 (SLP)    Improve Problem Solving Through Goal 1 (SLP) determine solutions to simple ADL/safety problems;determine solutions to multifactorial problems;sequence steps in a task;complete organization/home management task;70%;independently (over 90% accuracy)  -SR determine solutions to simple ADL/safety problems;determine solutions to multifactorial problems;sequence steps in a task;complete organization/home management task;70%;independently (over 90% accuracy)  -SR determine solutions to simple ADL/safety problems;determine solutions to multifactorial problems;sequence steps in a task;complete organization/home management task;70%;independently (over 90% accuracy)  -SR    Time Frame (Problem Solving Goal 1, SLP) by discharge  -SR by discharge  -SR by discharge  -SR    Progress/Outcomes (Problem Solving Goal 1, SLP) good progress toward goal  -SR good progress toward goal;goal partially met  -SR good progress toward goal;goal partially met  -SR    Comment (Problem Solving Goal 1, SLP) Deduction by exclusion; patient followed directions and used process elimination as a strategy to determine final calendar date with 80% acc given no cues.  -SR Patient followed directions and used the clues from the paragraph to organize information on a garden plot (4x3 grid) during a reasoning/logic activity with 50% acc given no cues; 50% acc given min cues. Patient reported that she was able to attend to task much better than yesterday.  -SR Patient labeled 5 steps in sequential order with 100% acc given no cues. In a following activity, patient listened to two sentences and labeled content as same/different with 90% acc given no cues.  -SR       Executive Functional Skills Goal 1 (SLP)    Improve Executive Function Skills Goal 1 (SLP)  demonstrate awareness of deficit;identify strategies, strengths, limitations;time management activity;complex organization/planning activity;home management activity;70%;independently (over 90% accuracy)  -SR demonstrate awareness of deficit;identify strategies, strengths, limitations;time management activity;complex organization/planning activity;home management activity;70%;independently (over 90% accuracy)  -SR demonstrate awareness of deficit;identify strategies, strengths, limitations;time management activity;complex organization/planning activity;home management activity;70%;independently (over 90% accuracy)  -SR    Time Frame (Executive Function Skills Goal 1, SLP) by discharge  -SR by discharge  -SR by discharge  -SR    Progress/Outcomes (Executive Function Skills Goal 1, SLP) good progress toward goal  -SR good progress toward goal  -SR goal ongoing  -SR    Comment (Executive Function Skills Goal 1, SLP) Arrival/Departure times; patient answered questions about flight times with 20% acc given no cues; 60% acc given min cues; 80% acc given mod cues. Patient demonstrates increased awareness of current deficits with problem-solving/reasoning based activities. Motivated to completed structured therapy activities.  -SR Patient used school schedule to answer questions about time with 63% acc given no cues; 100% acc given min cues.  -SR Patient used daily schedule to answer questions with 75% acc given no cues; 100% acc given min cues.  -SR          User Key  (r) = Recorded By, (t) = Taken By, (c) = Cosigned By    Initials Name Provider Type    Marlen Becerra SLP Speech and Language Pathologist                            Time Calculation:        Time Calculation- SLP     Row Name 09/22/22 1519 09/22/22 1141          Time Calculation- SLP    SLP Start Time 1300  -SR 0900  -SR     SLP Stop Time 1330  -SR 0930  -SR     SLP Time Calculation (min) 30 min  -SR 30 min  -SR           User Key  (r) = Recorded By, (t)  = Taken By, (c) = Cosigned By    Initials Name Provider Type    Marlen Becerra SLP Speech and Language Pathologist                  Therapy Charges for Today     Code Description Service Date Service Provider Modifiers Qty    30580337242 HC ST DEV OF COGN SKILLS INITIAL 15 MIN 9/21/2022 Marlen Ochoa SLP  1    59564857111 HC ST DEV OF COGN SKILLS EACH ADDT'L 15 MIN 9/21/2022 Marlen Ochoa SLP  3    [id number] HC ST DEV OF COGN SKILLS INITIAL 15 MIN 9/22/2022 Marlen Ochoa SLP  1    [id number] HC ST DEV OF COGN SKILLS EACH ADDT'L 15 MIN 9/22/2022 Marlen Ochoa SLP  3                           RAGHU Zavala  9/22/2022

## 2022-09-22 NOTE — NURSING NOTE
"   09/22/22 1520   Colostomy LLQ   Placement Date/Time: 09/09/22 2100   Placed by External Staff?: Other hospital  Location: LLQ   Stomal Appliance 1 piece;Changed  (Lucio)   Stoma Appearance round;red;flush with skin;moist   Peristomal Assessment Excoriation   Accessories/Skin Care skin barrier powder;cleansed with water;skin barrier ring   Stoma Function stool   Stool Color brown   Stool Consistency loose;creamy   Treatment Bag change;Site care     Wound/ostomy - ostomy pouch has been leaking requiring 2 pouch changes yesterday. Ostomy in a fold and is flush with skin so is a challenging stoma to pouch. Discussed with aid what stool consistency is like and it fluctuates between thick and pasty to creamy and loose. Patien tis staying in recliner and not ever in the bed. She reports that she does lay recliner flat at night to sleep but otherwise she is seated more upright.     Pouch was not leaking out edges but stool could be observed undermining barrier so pouch was changed. Used a Lucio flat, flexible 1 piece with 1 barrier ring around and 2 pieces of barrier ring at each side 3 & 9 oclock, attempted to use paste at these areas but paste caused burning from the skin excoriation. May need to try an ostomy belt and a soft convex pouch.     Spouse was present and observed pouch change.  Each time I have seen her he asks if the pouch using is the one that \"were going to stick with\". Explained to spouse that we are still trying to find the best fitting pouch for this stoma. Patient's nurse discussed teaching the family ostomy care. Discussed that one daughter has been taught and spouse has observed. Ongoing education would be valuable but with the daughters work schedules it may not be feasible to get everyone completely independent before d/c next week but with one family member independent this family member can work with showing the others.   "

## 2022-09-22 NOTE — THERAPY TREATMENT NOTE
Inpatient Rehabilitation - Occupational Therapy Treatment Note    [facility]     Patient Name: Summer Smith  : 1950  MRN: [id number]    Today's Date: 2022                 Admit Date: 2022         ICD-10-CM ICD-9-CM   1. Follow-up exam  Z09 V67.9       Patient Active Problem List   Diagnosis   • Aneurysm (HCC)       Past Medical History:   Diagnosis Date   • COPD (chronic obstructive pulmonary disease) (HCC)    • Elevated cholesterol    • GERD (gastroesophageal reflux disease)        Past Surgical History:   Procedure Laterality Date   • CARDIAC CATHETERIZATION     • COLON SURGERY     • COLONOSCOPY     • SKIN BIOPSY               IRF OT ASSESSMENT FLOWSHEET (last 12 hours)     IRF OT Evaluation and Treatment     Row Name 22 121          OT Time and Intention    Document Type daily treatment  -DN     Mode of Treatment occupational therapy  -DN     Patient Effort good  -DN     Row Name 22 121          Pain Assessment    Pretreatment Pain Rating 0/10 - no pain  -DN     Posttreatment Pain Rating 0/10 - no pain  -DN     Row Name 22 121          Cognition/Psychosocial    Affect/Mental Status (Cognition) WFL  -DN     Orientation Status (Cognition) oriented x 3  -DN     Follows Commands (Cognition) follows one-step commands;follows two-step commands;over 90% accuracy  -DN     Personal Safety Interventions fall prevention program maintained;gait belt  -DN     Row Name 22          Bathing    [place] Level (Bathing) bathing skills;supervision;verbal cues  -DN     Assistive Device (Bathing) shower chair;hand held shower spray hose;grab bar/tub rail;long-handled sponge  -DN     Set-up Assistance (Bathing) obtain supplies  -DN     Comment (Bathing) Less vc for sequencing  -DN     Row Name 22 121          Upper Body Dressing    [place] Level (Upper Body Dressing) upper body dressing skills;doff;don;pull over garment;supervision  -DN     Position (Upper Body  Dressing) supported sitting  -DN     Set-up Assistance (Upper Body Dressing) obtain clothing  -DN     Row Name 09/22/22 1217          Lower Body Dressing    [place] Level (Lower Body Dressing) doff;don;shoes/slippers;pants/bottoms;socks;moderate assist (50% patient effort)  -DN     Position (Lower Body Dressing) supported sitting;supported standing  -DN     Set-up Assistance (Lower Body Dressing) obtain clothing  -DN     Comment (Lower Body Dressing) tried without reacher today  -DN     Row Name 09/22/22 1217          Grooming    [place] Level (Grooming) grooming skills;set up  -DN     Position (Grooming) sink side  -DN     Set-up Assistance (Grooming) obtain supplies  -DN     Row Name 09/22/22 1217          Toileting    [place] Level (Toileting) toileting skills;moderate assist (50% patient effort);verbal cues;nonverbal cues (demo/gesture)  -DN     Position (Toileting) supported standing;supported sitting  -DN     Comment (Toileting) pt dependent with colostomy care  -DN     Row Name 09/22/22 1217          Transfers    [place] Level (Shower Transfer) contact guard  -DN     Assistive Device (Shower Transfer) grab bar, tub/shower;shower chair  -DN     Row Name 09/22/22 1217          Shower Transfer    Type (Shower Transfer) stand pivot/stand step  -DN     Row Name 09/22/22 1217          Hand (Therapeutic Exercise)    Hand (Therapeutic Exercise) strengthening exercise  -DN     Hand AROM/AAROM (Therapeutic Exercise) bilateral  -DN     Hand Strengthening (Therapeutic Exercise) hand gripper  -DN           User Key  (r) = Recorded By, (t) = Taken By, (c) = Cosigned By    Initials Name Effective Dates    Sina Currie, OT 06/16/21 -                          OT Recommendation and Plan                         Time Calculation:      Time Calculation- OT     Row Name 09/22/22 0800             Time Calculation- OT    OT Start Time 0800  -DN      OT Stop Time 0900  -DN      OT Time Calculation (min) 60  min  -DN            User Key  (r) = Recorded By, (t) = Taken By, (c) = Cosigned By    Initials Name Provider Type    Sina Currie OT Occupational Therapist              Therapy Charges for Today     Code Description Service Date Service Provider Modifiers Qty    12403684717 HC OT SELF CARE/MGMT/TRAIN EA 15 MIN 9/21/2022 Sina Walls OT GO 3    83705061336 HC OT THER PROC EA 15 MIN 9/21/2022 Sina Walls OT GO 1    62658098827 HC OT SELF CARE/MGMT/TRAIN EA 15 MIN 9/22/2022 Sina Walls OT GO 3    90022776170 HC OT THER PROC EA 15 MIN 9/22/2022 Sina Walls OT GO 1                   Sina Walls OT  9/22/2022

## 2022-09-23 PROCEDURE — 97530 THERAPEUTIC ACTIVITIES: CPT

## 2022-09-23 PROCEDURE — 97129 THER IVNTJ 1ST 15 MIN: CPT

## 2022-09-23 PROCEDURE — 25010000002 ENOXAPARIN PER 10 MG: Performed by: PHYSICAL MEDICINE & REHABILITATION

## 2022-09-23 PROCEDURE — 97535 SELF CARE MNGMENT TRAINING: CPT

## 2022-09-23 PROCEDURE — 97110 THERAPEUTIC EXERCISES: CPT

## 2022-09-23 PROCEDURE — 97130 THER IVNTJ EA ADDL 15 MIN: CPT

## 2022-09-23 RX ADMIN — AMLODIPINE BESYLATE 5 MG: 5 TABLET ORAL at 07:11

## 2022-09-23 RX ADMIN — ENOXAPARIN SODIUM 40 MG: 100 INJECTION SUBCUTANEOUS at 19:59

## 2022-09-23 RX ADMIN — Medication 2000 UNITS: at 07:11

## 2022-09-23 RX ADMIN — LISINOPRIL 5 MG: 5 TABLET ORAL at 07:11

## 2022-09-23 RX ADMIN — PANTOPRAZOLE SODIUM 40 MG: 40 TABLET, DELAYED RELEASE ORAL at 05:45

## 2022-09-23 RX ADMIN — SERTRALINE 25 MG: 25 TABLET, FILM COATED ORAL at 07:12

## 2022-09-23 RX ADMIN — ATORVASTATIN CALCIUM 40 MG: 20 TABLET, FILM COATED ORAL at 19:59

## 2022-09-23 NOTE — THERAPY TREATMENT NOTE
Inpatient Rehabilitation - Speech Language Pathology Treatment Note    [facility]     Patient Name: Summer Smith  : 1950  MRN: [id number]    Today's Date: 2022                   Admit Date: 2022       Visit Dx:      ICD-10-CM ICD-9-CM   1. Follow-up exam  Z09 V67.9       Patient Active Problem List   Diagnosis   • Aneurysm (HCC)       Past Medical History:   Diagnosis Date   • COPD (chronic obstructive pulmonary disease) (HCC)    • Elevated cholesterol    • GERD (gastroesophageal reflux disease)        Past Surgical History:   Procedure Laterality Date   • CARDIAC CATHETERIZATION     • COLON SURGERY     • COLONOSCOPY     • SKIN BIOPSY         SLP Recommendation and Plan                                                   SLP EVALUATION (last 72 hours)     SLP SLC Evaluation     Row Name 22 1300 22 1000 22 1300 22 0900 22 1400       Communication Assessment/Intervention    Document Type therapy note (daily note)  -SR therapy note (daily note)  -SR therapy note (daily note)  -SR therapy note (daily note)  -SR therapy note (daily note)  -SR    Subjective Information no complaints  -SR no complaints  -SR no complaints  -SR no complaints  -SR no complaints  -SR    Patient Observations alert;cooperative;agree to therapy  -SR alert;cooperative;agree to therapy  -SR alert;cooperative;agree to therapy  -SR alert;cooperative;agree to therapy  -SR alert;cooperative;agree to therapy  -SR    Patient Effort good  -SR good  -SR good  -SR good  -SR good  -SR    Symptoms Noted During/After Treatment none  -SR none  -SR none  -SR none  -SR none  -SR       Pain Scale: Numbers Pre/Post-Treatment    Pretreatment Pain Rating 0/10 - no pain  -SR 0/10 - no pain  -SR 0/10 - no pain  -SR 0/10 - no pain  -SR 0/10 - no pain  -SR    Posttreatment Pain Rating 0/10 - no pain  -SR 0/10 - no pain  -SR 0/10 - no pain  -SR 0/10 - no pain  -SR 0/10 - no pain  -SR    Row Name 22 0900           "         Communication Assessment/Intervention    Document Type therapy note (daily note)  -SR        Subjective Information no complaints  -SR        Patient Observations alert;cooperative;agree to therapy  -SR        Patient Effort good  -SR        Symptoms Noted During/After Treatment none  -SR                  Pain Scale: Numbers Pre/Post-Treatment    Pretreatment Pain Rating 0/10 - no pain  -SR        Posttreatment Pain Rating 0/10 - no pain  -SR              User Key  (r) = Recorded By, (t) = Taken By, (c) = Cosigned By    Initials Name Effective Dates    SR Marlen Ochoa, RAGHU 01/12/22 -                    EDUCATION    The patient has been educated in the following areas:       Cognitive Impairment.             SLP GOALS     Row Name 09/23/22 1300 09/23/22 1000 09/22/22 1300       Attention Goal 1 (SLP)    Improve Attention by Goal 1 (SLP) attending to task;complete selective attention task;80%;independently (over 90% accuracy)  -SR attending to task;complete selective attention task;80%;independently (over 90% accuracy)  -SR attending to task;complete selective attention task;80%;independently (over 90% accuracy)  -SR    Time Frame (Attention Goal 1, SLP) by discharge  -SR by discharge  -SR by discharge  -SR    Progress (Attention Goal 1, SLP) -- -- 80%;independently (over 90% accuracy)  -SR    Progress/Outcomes (Attention Goal 1, SLP) goal partially met  -SR goal partially met  -SR good progress toward goal  -SR    Comment (Attention Goal 1, SLP) Patient followed written \"if-then\" conditional directions with 90% acc given no cues  -SR -- --       Organizational Skills Goal 1 (SLP)    Improve Thought Organization Through Goal 1 (SLP) -- completing mental manipulation task;80%;independently (over 90% accuracy)  -SR --    Time Frame (Thought Organization Skills Goal 1, SLP) -- by discharge  -SR --    Progress/Outcomes (Thought Organization Skills Goal 1, SLP) -- goal partially met  -SR --    Comment (Thought " Organization Skills Goal 1, SLP) -- Word puzzle (acrostic); patient labeled word given description and followed number/letter sequence to determine final phrase with 90% acc given no cues.  -SR --       Functional Problem Solving Skills Goal 1 (SLP)    Improve Problem Solving Through Goal 1 (SLP) determine solutions to simple ADL/safety problems;determine solutions to multifactorial problems;sequence steps in a task;complete organization/home management task;70%;independently (over 90% accuracy)  -SR -- determine solutions to simple ADL/safety problems;determine solutions to multifactorial problems;sequence steps in a task;complete organization/home management task;70%;independently (over 90% accuracy)  -SR    Time Frame (Problem Solving Goal 1, SLP) by discharge  -SR -- by discharge  -SR    Progress/Outcomes (Problem Solving Goal 1, SLP) goal partially met  -SR -- good progress toward goal  -SR    Comment (Problem Solving Goal 1, SLP) Patient used baking visual to answer questions about baking times and measurements with 70% accuracy given no cues;100% acc given min cues  -SR -- Patient followed directions and used process of elimination to determine timeline for a schedule with 63% accuracy given no cues; 100% acc given min cues  -SR       Functional Math Skills Goal 1 (SLP)    Improve Functional Math Skills Through Goal 1 (SLP) -- complete word problems involving time;complete functional math task;70%;independently (over 90% accuracy)  -SR complete word problems involving time;complete functional math task;70%;independently (over 90% accuracy)  -SR    Time Frame (Functional Math Skills Goal 1, SLP) -- by discharge  -SR by discharge  -SR    Progress/Outcomes (Functional Math Skills Goal 1, SLP) -- good progress toward goal  -SR good progress toward goal  -SR    Comment (Functional Math Skills Goal 1, SLP) -- Patient used information from a [employer] menu to answer math-based scenerios with 80% accuracy given no  cues. No calculator assist.  -SR Patient completed checkbook balance activity given deposit/transaction amounts with 40% acc given no cues; 80% accuracy given mod cues.  -SR       Executive Functional Skills Goal 1 (SLP)    Improve Executive Function Skills Goal 1 (SLP) demonstrate awareness of deficit;identify strategies, strengths, limitations;time management activity;complex organization/planning activity;home management activity;70%;independently (over 90% accuracy)  -SR -- demonstrate awareness of deficit;identify strategies, strengths, limitations;time management activity;complex organization/planning activity;home management activity;70%;independently (over 90% accuracy)  -SR    Time Frame (Executive Function Skills Goal 1, SLP) by discharge  -SR -- by discharge  -SR    Progress (Executive Function Skills Goal 1, SLP) 80%;independently (over 90% accuracy)  -SR -- 80%;independently (over 90% accuracy)  -SR    Progress/Outcomes (Executive Function Skills Goal 1, SLP) goal partially met  -SR -- good progress toward goal  -SR    Row Name 09/22/22 0900 09/21/22 1400 09/21/22 0900       (LTG) Patient will demonstrate progress toward functional swallow for    Diet Texture (Demonstrate progress toward functional swallow) -- soft ground textures  -SR --    Liquid viscosity (Demonstrate progress toward functional swallow) -- thin liquids  -SR --    [place] (Demonstrate progress towards functional swallow) -- independently (over 90% accuracy)  -SR --    Time Frame (Demonstrate progress toward functional swallow) -- by discharge  -SR --    Progress/Outcomes (Demonstrate progress toward functional swallow) -- goal met  -SR --    Comment (Demonstrate progress toward functional swallow) -- Patient has shown increase in endurance and level of alertness compared to initial evaluation. Recommend upgrade to regular consistency with thin liquids; medication whole with thin liquid.  -SR --       Attention Goal 1 (SLP)     Improve Attention by Goal 1 (SLP) attending to task;complete selective attention task;80%;independently (over 90% accuracy)  -SR attending to task;complete selective attention task;80%;independently (over 90% accuracy)  -SR --    Time Frame (Attention Goal 1, SLP) by discharge  -SR by discharge  -SR --    Progress/Outcomes (Attention Goal 1, SLP) good progress toward goal  -SR good progress toward goal  -SR --    Comment (Attention Goal 1, SLP) Visual scanning; alternating attn; patient denys continuous line from letter to number in sequential order with 70% accuracy given no cues; 100% acc given min cues.  -SR Patient unscrambled 5-6 letter word given category with 90% accuracy given no cues.  -SR --       Memory Skills Goal 1 (SLP)    Improve Memory Skills Through Goal 1 (SLP) -- -- recalling unrelated word lists immediately;recalling unrelated word lists with an imposed delay;repeat list in sequential order;listen to a paragraph and answer questions;70%;independently (over 90% accuracy)  -SR    Time Frame (Memory Skills Goal 1, SLP) -- -- by discharge  -SR    Progress/Outcomes (Memory Skills Goal 1, SLP) -- -- goal partially met  -SR    Comment (Memory Skills Goal 1, SLP) -- -- Memory and mental manipulation; patient recalled 4 words in sequential order with 70% acc given no cues; 100% acc given min cues.  -SR       Functional Problem Solving Skills Goal 1 (SLP)    Improve Problem Solving Through Goal 1 (SLP) -- determine solutions to simple ADL/safety problems;determine solutions to multifactorial problems;sequence steps in a task;complete organization/home management task;70%;independently (over 90% accuracy)  -SR determine solutions to simple ADL/safety problems;determine solutions to multifactorial problems;sequence steps in a task;complete organization/home management task;70%;independently (over 90% accuracy)  -SR    Time Frame (Problem Solving Goal 1, SLP) -- by discharge  -SR by discharge  -SR     Progress/Outcomes (Problem Solving Goal 1, SLP) -- good progress toward goal;goal partially met  -SR good progress toward goal  -SR    Comment (Problem Solving Goal 1, SLP) -- Patient modified incongruities on a restaurant menu and provided rationale with 80% accuracy given no cues.  -SR Deduction by exclusion; patient followed directions and used process elimination as a strategy to determine final calendar date with 80% acc given no cues.  -SR       Functional Math Skills Goal 1 (SLP)    Improve Functional Math Skills Through Goal 1 (SLP) complete word problems involving time;complete functional math task;70%;independently (over 90% accuracy)  -SR complete word problems involving time;complete functional math task;70%;independently (over 90% accuracy)  -SR --    Time Frame (Functional Math Skills Goal 1, SLP) by discharge  -SR by discharge  -SR --    Progress/Outcomes (Functional Math Skills Goal 1, SLP) good progress toward goal;goal partially met  -SR good progress toward goal;goal partially met  -SR --    Comment (Functional Math Skills Goal 1, SLP) Patient answered functional word problems involving time with 50% acc given no cues; 100% acc given min-mod cues. Cues were provided for attn to detail.  -SR Patient used visual with restaurant prices to answer functional math-based scenerios with 80% accuracy given no cues; 100% accuracy given min cues.  -SR --       Executive Functional Skills Goal 1 (SLP)    Improve Executive Function Skills Goal 1 (SLP) demonstrate awareness of deficit;identify strategies, strengths, limitations;time management activity;complex organization/planning activity;home management activity;70%;independently (over 90% accuracy)  -SR -- demonstrate awareness of deficit;identify strategies, strengths, limitations;time management activity;complex organization/planning activity;home management activity;70%;independently (over 90% accuracy)  -SR    Time Frame (Executive Function Skills Goal  1, SLP) by discharge  -SR -- by discharge  -SR    Progress/Outcomes (Executive Function Skills Goal 1, SLP) good progress toward goal  -SR -- good progress toward goal  -SR    Comment (Executive Function Skills Goal 1, SLP) Patient used information to fill out monthly calendar with daily events; 90% accuracy given no cues. Patient crossed off stimulus items as she went in order to keep track of the information.  -SR -- Arrival/Departure times; patient answered questions about flight times with 20% acc given no cues; 60% acc given min cues; 80% acc given mod cues. Patient demonstrates increased awareness of current deficits with problem-solving/reasoning based activities. Motivated to completed structured therapy activities.  -SR          User Key  (r) = Recorded By, (t) = Taken By, (c) = Cosigned By    Initials Name Provider Type    Marlen Becerra SLP Speech and Language Pathologist                            Time Calculation:        Time Calculation- SLP     Row Name 09/23/22 1404 09/23/22 1143          Time Calculation- SLP    SLP Start Time 1300  -SR 1030  -SR     SLP Stop Time 1330  -SR 1100  -SR     SLP Time Calculation (min) 30 min  -SR 30 min  -SR           User Key  (r) = Recorded By, (t) = Taken By, (c) = Cosigned By    Initials Name Provider Type    Marlen Becerra SLP Speech and Language Pathologist                  Therapy Charges for Today     Code Description Service Date Service Provider Modifiers Qty    41912458357 HC ST DEV OF COGN SKILLS INITIAL 15 MIN 9/22/2022 Marlen Ochoa SLP  1    13597126945 HC ST DEV OF COGN SKILLS EACH ADDT'L 15 MIN 9/22/2022 Marlen Ochoa SLP  3    20624639122 HC ST DEV OF COGN SKILLS INITIAL 15 MIN 9/23/2022 Marlen Ochoa SLP  1    43761938007 HC ST DEV OF COGN SKILLS EACH ADDT'L 15 MIN 9/23/2022 Marlen Ochoa SLP  3                           RAGHU Zavala  9/23/2022

## 2022-09-23 NOTE — PROGRESS NOTES
Inpatient Rehabilitation Plan of Care Note    Plan of Care  Care Plan Reviewed - Updates as Follows    Pain    [RN] Pain Management(Active)  Current Status(09/20/2022): Pain currently at tolerable rate  Weekly Goal(09/26/2022): Pain will be managed at tolerable rate  Discharge Goal: Patient will be able to tolerate pain and manage    Performed Intervention(s)  Pain medication as requested and available  Repositioning and wound care      Sphincter Control    [RN] Bladder Management(Active)  Current Status(09/20/2022): Continent of Bladder mostly  Weekly Goal(09/26/2022): Pt continent of bladder  Discharge Goal: Pt continent 100%    Performed Intervention(s)  Incontinance products and elo care  answer call light promptly  WCON consult  skin/stoma assessment      Psychosocial    Performed Intervention(s)  supportive family  Pt given opportunity to express concerns/feelings      Safety    Performed Intervention(s)  Personal items and call light w/in reach  falls precaution  hourly rounding    Signed by: Salma Raza RN

## 2022-09-23 NOTE — PLAN OF CARE
Goal Outcome Evaluation:      Slept fair, in recliner. Meds with water. Continent of B&B. Tylenol given for Headache with relief. Ace wrapped bilateral legs tonight. Declines SCD's.

## 2022-09-23 NOTE — THERAPY TREATMENT NOTE
Inpatient Rehabilitation - Occupational Therapy Treatment Note    [facility]     Patient Name: Summer Smith  : 1950  MRN: [id number]    Today's Date: 2022                 Admit Date: 2022         ICD-10-CM ICD-9-CM   1. Follow-up exam  Z09 V67.9       Patient Active Problem List   Diagnosis   • Aneurysm (HCC)       Past Medical History:   Diagnosis Date   • COPD (chronic obstructive pulmonary disease) (HCC)    • Elevated cholesterol    • GERD (gastroesophageal reflux disease)        Past Surgical History:   Procedure Laterality Date   • CARDIAC CATHETERIZATION     • COLON SURGERY     • COLONOSCOPY     • SKIN BIOPSY               IRF OT ASSESSMENT FLOWSHEET (last 12 hours)     IRF OT Evaluation and Treatment     Row Name 22 1142          OT Time and Intention    Document Type progress note  -DN     Mode of Treatment occupational therapy  -DN     Patient Effort good  -DN     Comment, Evaluation/Treatment Not Performed pt had difficult morning with colostomy bag blowout and needed full body and bed clean, pt discouraged and fearfull of bending over to dress, will have to reintroduce AE  -DN     Row Name 22 1142          Pain Assessment    Pretreatment Pain Rating 0/10 - no pain  -DN     Posttreatment Pain Rating 0/10 - no pain  -DN     Row Name 22 1142          Cognition/Psychosocial    Affect/Mental Status (Cognition) WFL  -DN     Orientation Status (Cognition) oriented x 3  -DN     Follows Commands (Cognition) follows one-step commands;over 90% accuracy  -DN     Cognitive Function attention deficit  -DN     Attention Deficit (Cognition) minimal deficit  -DN     Memory Deficit (Cognition) minimal deficit  -DN     Row Name 22 1142          Bathing    [place] Level (Bathing) bathing skills;supervision;verbal cues  -DN     Assistive Device (Bathing) long-handled sponge  -DN     Position (Bathing) sink side;supported sitting;supported standing  -DN     Set-up  Assistance (Bathing) obtain supplies  -DN     Row Name 09/23/22 1142          Upper Body Dressing    [place] Level (Upper Body Dressing) upper body dressing skills;doff;don;pull over garment;set up assistance  -DN     Position (Upper Body Dressing) supported sitting  -DN     Set-up Assistance (Upper Body Dressing) obtain clothing  -DN     Comment (Upper Body Dressing) no bra  -DN     Row Name 09/23/22 1142          Lower Body Dressing    [place] Level (Lower Body Dressing) doff;don;pants/bottoms;shoes/slippers;socks;underwear;moderate assist (50% patient effort)  -DN     Assistive Device Use (Lower Body Dressing) reacher;long-handled shoe horn  -DN     Position (Lower Body Dressing) supported sitting  -DN     Set-up Assistance (Lower Body Dressing) obtain clothing  -DN     Comment (Lower Body Dressing) setup for jobst stockings  -DN     Row Name 09/23/22 1142          Grooming    [place] Level (Grooming) grooming skills;set up  -DN     Position (Grooming) sink side;supported sitting  -DN     Row Name 09/23/22 1142          Toileting    [place] Level (Toileting) toileting skills;adjust/manage clothing;perform perineal hygiene;verbal cues;moderate assist (50% patient effort)  -DN     Assistive Device Use (Toileting) grab bar/safety frame;raised toilet seat  -DN     Position (Toileting) supported sitting;supported standing  -DN     Set-up Assistance (Toileting) obtain supplies  -DN     Comment (Toileting) pt dependent with colostomy care  -DN     Row Name 09/23/22 1142          Shoulder (Therapeutic Exercise)    Shoulder (Therapeutic Exercise) strengthening exercise  -DN     Shoulder AROM (Therapeutic Exercise) bilateral;10 repetitions;3 sets  -DN     Shoulder Strengthening (Therapeutic Exercise) 1 lb free weight  -DN     Row Name 09/23/22 1142          Elbow/Forearm (Therapeutic Exercise)    Elbow/Forearm (Therapeutic Exercise) strengthening exercise  -DN     Elbow/Forearm AROM (Therapeutic  Exercise) bilateral;10 repetitions;3 sets  -DN     Elbow/Forearm Strengthening (Therapeutic Exercise) 2 lb free weight  -DN     Row Name 09/23/22 1142          Positioning and Restraints    Pre-Treatment Position in bed  -DN     Post Treatment Position wheelchair  -DN     In Bed sitting;call light within reach;encouraged to call for assist;exit alarm on  -DN           User Key  (r) = Recorded By, (t) = Taken By, (c) = Cosigned By    Initials Name Effective Dates    Sina Currie OT 06/16/21 -                  Occupational Therapy Education                 Title: PT OT SLP Therapies (Done)     Topic: Occupational Therapy (Done)     Point: ADL training (Done)     Description:   Instruct learner(s) on proper safety adaptation and remediation techniques during self care or transfers.   Instruct in proper use of assistive devices.              Learning Progress Summary           Patient Acceptance, E,TB,D, VU,NR by DN at 9/23/2022 1154    Comment: Transfer techniques with  commode  and walkin shower at RWX level, use of reacher, long handled bath sponge                   Point: Home exercise program (Done)     Description:   Instruct learner(s) on appropriate technique for monitoring, assisting and/or progressing therapeutic exercises/activities.              Learning Progress Summary           Patient Acceptance, E,TB,D, VU,NR by DN at 9/23/2022 1154    Comment: Transfer techniques with  commode  and walkin shower at RWX level, use of reacher, long handled bath sponge                   Point: Precautions (Done)     Description:   Instruct learner(s) on prescribed precautions during self-care and functional transfers.              Learning Progress Summary           Patient Acceptance, E,TB,D, VU,NR by DN at 9/23/2022 1154    Comment: Transfer techniques with  commode  and walkin shower at RWX level, use of reacher, long handled bath sponge                   Point: Body mechanics (Done)     Description:   Instruct  learner(s) on proper positioning and spine alignment during self-care, functional mobility activities and/or exercises.              Learning Progress Summary           Patient Acceptance, E,TB,D, VU,NR by DAWSON at 9/23/2022 1154    Comment: Transfer techniques with  commode  and walkin shower at RWX level, use of reacher, long handled bath sponge                               User Key     Initials Effective Dates Name Provider Type Discipline    DAWSON 06/16/21 -  Sina Walls OT Occupational Therapist OT                    OT Recommendation and Plan                         Time Calculation:      Time Calculation- OT     Row Name 09/23/22 0800             Time Calculation- OT    OT Start Time 0800  -DN      OT Stop Time 0900  -DN      OT Time Calculation (min) 60 min  -DN            User Key  (r) = Recorded By, (t) = Taken By, (c) = Cosigned By    Initials Name Provider Type    Sina Currie OT Occupational Therapist              Therapy Charges for Today     Code Description Service Date Service Provider Modifiers Qty    10184961771 HC OT SELF CARE/MGMT/TRAIN EA 15 MIN 9/22/2022 Sina Walls OT GO 3    19426965179 HC OT THER PROC EA 15 MIN 9/22/2022 Sina Walls OT GO 1    74996213702 HC OT SELF CARE/MGMT/TRAIN EA 15 MIN 9/23/2022 Sina Walls OT GO 3    44823823018 HC OT THER PROC EA 15 MIN 9/23/2022 Sina Walls OT GO 1                   Sina Walls OT  9/23/2022

## 2022-09-23 NOTE — THERAPY TREATMENT NOTE
Inpatient Rehabilitation - Physical Therapy Treatment Note       [facility]     Patient Name: Summer Smith  : 1950  MRN: [id number]    Today's Date: 2022                    Admit Date: 2022      Visit Dx:     ICD-10-CM ICD-9-CM   1. Follow-up exam  Z09 V67.9       Patient Active Problem List   Diagnosis   • Aneurysm (HCC)       Past Medical History:   Diagnosis Date   • COPD (chronic obstructive pulmonary disease) (HCC)    • Elevated cholesterol    • GERD (gastroesophageal reflux disease)        Past Surgical History:   Procedure Laterality Date   • CARDIAC CATHETERIZATION     • COLON SURGERY     • COLONOSCOPY     • SKIN BIOPSY         PT ASSESSMENT (last 12 hours)     IRF PT Evaluation and Treatment     Row Name 22          PT Time and Intention    Document Type daily treatment  -DP     Mode of Treatment individual therapy;physical therapy  -DP     Patient/Family/Caregiver Comments/Observations Pt reclined in recliner but agreeable to PT  -DP     Row Name 22          General Information    Patient Profile Reviewed yes  -DP     Existing Precautions/Restrictions fall  -DP     Row Name 22          Pain Assessment    Pretreatment Pain Rating 0/10 - no pain  -DP     Posttreatment Pain Rating 0/10 - no pain  -DP     Row Name 2223          Cognition/Psychosocial    Affect/Mental Status (Cognition) WFL  -DP     Orientation Status (Cognition) oriented x 3  -DP     Follows Commands (Cognition) follows one-step commands;follows two-step commands;over 90% accuracy  -DP     Personal Safety Interventions safety round/check completed;elopement precautions initiated;fall prevention program maintained;gait belt;muscle strengthening facilitated;nonskid shoes/slippers when out of bed;supervised activity  -DP     Cognitive Function attention deficit;memory deficit  -DP     Row Name 22          Bed Mobility    Comment, (Bed Mobility) UIC  -DP     Row Name  09/23/22 0823          Transfer Assessment/Treatment    Transfers sit-stand transfer;stand-sit transfer;car transfer  -DP     Comment, (Transfers) CGA for all transfers including car transfer as of today  -DP     Row Name 09/23/22 0823          Transfers    Sit-Stand [place] (Transfers) contact guard;verbal cues  -DP     Stand-Sit [place] (Transfers) contact guard;verbal cues  -DP     Row Name 09/23/22 0823          Sit-Stand Transfer    Assistive Device (Sit-Stand Transfers) walker, front-wheeled  -DP     Row Name 09/23/22 0823          Stand-Sit Transfer    Assistive Device (Stand-Sit Transfers) walker, front-wheeled  -DP     Row Name 09/23/22 0823          Car Transfer    Type (Car Transfer) sit-stand;stand-sit  -DP     [place] Level (Car Transfer) contact guard;verbal cues  -DP     Assistive Device (Car Transfer) walker, front-wheeled  -DP     Comment, (Car Transfer) Pt able to lift her legs using UE's but did not require assist from PT  -DP     Row Name 09/23/22 0823          Gait/Stairs (Locomotion)    [place] Level (Gait) contact guard;verbal cues  -DP     Assistive Device (Gait) walker, front-wheeled  -DP     Distance in Feet (Gait) 10'x2, 100'x1, 80'x1  -DP     Pattern (Gait) step-through  -DP     Deviations/Abnormal Patterns (Gait) base of support, narrow;hunter decreased  -DP     Bilateral Gait Deviations forward flexed posture;heel strike decreased  -DP     Comment, (Gait/Stairs) Pt SpO2 stayed at 91% or greater during session using 0 L of O2  -DP     Row Name 09/23/22 0823          Safety Issues, Functional Mobility    Impairments Affecting Function (Mobility) balance;endurance/activity tolerance;shortness of breath;strength  -DP     Row Name 09/23/22 0823          Balance    Comment, Balance Pt stepped over hurdles for 16 feet using 1UE on cecilio bar and 1UR for HHA. PT used CGA to Xin for this intervention.  -DP     Row Name 09/23/22 0823          Motor Skills    Therapeutic  Exercise hip;knee  -DP     Row Name 09/23/22 0823          Hip (Therapeutic Exercise)    Hip AROM (Therapeutic Exercise) bilateral;flexion;5 repetitions;sitting  -DP     Row Name 09/23/22 0823          Knee (Therapeutic Exercise)    Knee Strengthening (Therapeutic Exercise) bilateral;hamstring curls;sitting;resistance band;red;10 repetitions;LAQ (long arc quad);2 lb free weight;5 repetitions  -DP     Row Name 09/23/22 0823          Positioning and Restraints    Pre-Treatment Position sitting in chair/recliner  -DP     Post Treatment Position wheelchair  -DP     In Wheelchair with SLP  -DP           User Key  (r) = Recorded By, (t) = Taken By, (c) = Cosigned By    Initials Name Provider Type    Preston Kim, PT Physical Therapist              Wound 09/09/22 2200 Right upper arm Laceration (Active)   Dressing Appearance open to air 09/23/22 0703   Closure Sutures 09/23/22 0703   Base clean;dry;scab 09/23/22 0703   Drainage Amount none 09/23/22 0703   Dressing Care open to air 09/23/22 0703   Adhesive Closure Strips Applied 09/22/22 1600   Sutures Removed Intact Yes 09/22/22 1600   Number of Sutures Removed 2 09/22/22 1600       Wound 09/09/22 2200 Left midline abdomen Incision (Active)   Dressing Appearance open to air 09/23/22 0703   Closure Approximated 09/23/22 0703   Base clean;dry 09/23/22 0703   Drainage Amount none 09/23/22 0703   Care, Wound cleansed with;soap and water 09/23/22 0703   Dressing Care open to air 09/23/22 0703     Physical Therapy Education                 Title: PT OT SLP Therapies (Done)     Topic: Physical Therapy (Done)     Point: Mobility training (Done)     Learning Progress Summary           Patient Acceptance, E,D, VU,DU by DP at 9/23/2022 1542   Family Acceptance, E, VU by WN at 9/21/2022 2212   Significant Other Acceptance, E, VU by WN at 9/21/2022 2212      Show all documentation for this point (15)                 Point: Home exercise program (Done)     Learning Progress Summary            Patient Acceptance, E,D, VU,DU by DP at 9/23/2022 1542   Family Acceptance, E, VU by WN at 9/21/2022 2212   Significant Other Acceptance, E, VU by WN at 9/21/2022 2212      Show all documentation for this point (15)                 Point: Body mechanics (Done)     Learning Progress Summary           Patient Acceptance, E,D, VU,DU by DP at 9/23/2022 1542   Family Acceptance, E, VU by WN at 9/21/2022 2212   Significant Other Acceptance, E, VU by WN at 9/21/2022 2212      Show all documentation for this point (15)                 Point: Precautions (Done)     Learning Progress Summary           Patient Acceptance, E,D, VU,DU by DP at 9/23/2022 1542   Family Acceptance, E, VU by WN at 9/21/2022 2212   Significant Other Acceptance, E, VU by WN at 9/21/2022 2212      Show all documentation for this point (16)                             User Key     Initials Effective Dates Name Provider Type Discipline    WN 08/23/22 -  Sancho Eugene RN Registered Nurse Nurse    DP 08/24/21 -  Preston Hills PT Physical Therapist PT                PT Recommendation and Plan                          Time Calculation:      PT Charges     Row Name 09/23/22 1544 09/23/22 1542          Time Calculation    Start Time 1230  -DP 1000  -DP     Stop Time 1300  -DP 1030  -DP     Time Calculation (min) 30 min  -DP 30 min  -DP     PT Received On -- 09/23/22  -DP     PT - Next Appointment -- 09/24/22  -DP            Time Calculation- PT    Total Timed Code Minutes- PT 30 minute(s)  -DP 30 minute(s)  -DP           User Key  (r) = Recorded By, (t) = Taken By, (c) = Cosigned By    Initials Name Provider Type    DP Preston Hills, PT Physical Therapist                Therapy Charges for Today     Code Description Service Date Service Provider Modifiers Qty    59865134783 HC GAIT TRAINING EA 15 MIN 9/22/2022 Preston Hills, PT GP 1    19592018428 HC PT THERAPEUTIC ACT EA 15 MIN 9/22/2022 Preston Hills, PT GP 2    62553724578 HC PT THER PROC  EA 15 MIN 9/22/2022 Preston Hills, PT GP 1    30398789905 HC PT THERAPEUTIC ACT EA 15 MIN 9/23/2022 Preston Hills, PT GP 3    51042783381 HC PT THER PROC EA 15 MIN 9/23/2022 Preston Hills, PT GP 1              Patient was wearing a face mask during this therapy encounter. Therapist used appropriate personal protective equipment including mask and gloves.  Mask used was standard procedure mask. Appropriate PPE was worn during the entire therapy session. Hand hygiene was completed before and after therapy session. Patient is not in enhanced droplet precautions.         Preston Hills, PT  9/23/2022

## 2022-09-23 NOTE — PLAN OF CARE
Goal Outcome Evaluation:           Progress: improving  Outcome Evaluation: A&OX4. Calm, cooperative, and pleasant. Forgetful at times.  at bedside. Participated fully in therapy. Aneurysm. Abdominal incision. R. elbow has bruises and sutures. Wound care completed. Colostomy to L. lower abdomen. Colostomy bag last changed this morning by night nurse. One-piece extra appliances in her room. Wears glasses. Diet Reg, thins. No pain meds needed today. Meds whole with water. Continent B&B. Frequency. F/C 9/26 at 11AM. D/C Wed. 9/28.

## 2022-09-23 NOTE — NURSING NOTE
09/23/22 1510   Colostomy LLQ   Placement Date/Time: 09/09/22 2100   Placed by External Staff?: Other hospital  Location: LLQ   Stomal Appliance 1 piece;Changed  (Lucio soft convex)   Stoma Appearance round;red;moist;flush with skin   Peristomal Assessment Excoriation  (mild)   Accessories/Skin Care cleansed with water;skin barrier ring;convex wafer;appliance belt   Stoma Function stool   Stool Color brown   Stool Consistency loose   Treatment Bag change;Site care     Wound/ostomy - patient's pouch has continued to leak requiring pouch changes 2-3 times daily. Pouch adherence was initially not an issue but as her activity has increased the pouch has been leaking which is expected for a flush with skin stoma in a deep fold. Placed patient in a soft convex with a barrier ring and an ostomy belt to provide more support and hold in place.

## 2022-09-23 NOTE — PROGRESS NOTES
LOS: 14 days   Patient Care Team:  Wale Jacob MD as PCP - General (Family Medicine)      JASBIR F ELA  1950         ADMITTING DIAGNOSIS:  Immobility syndrome    Subjective     Edema is improved in the legs.  Difficulty with getting the ostomy appliance to stay in place.  Working on different types.  Tolerating therapies.  No abdominal complaints otherwise.    Objective     Vitals:    09/23/22 1221   BP: 130/58   Pulse: 86   Resp: 18   Temp: 99.2 °F (37.3 °C)   SpO2: 94%       PHYSICAL EXAM:     Awake, alert and 0x3  NAD  Heart: Regular rate and rhythm.  No rub murmur or gallop.  Lungs: Clear to auscultation bilaterally  Abdomen: Colostomy  Normoactive bowel sounds.  Soft and nontender    Ext:  right lateral elbow traumatic laceration healed   Good strength bilaterally  Trace edema bilateral lower extremities    MEDICATIONS  Scheduled Meds:amLODIPine, 5 mg, Oral, Q24H  atorvastatin, 40 mg, Oral, Nightly  cholecalciferol, 2,000 Units, Oral, Daily  enoxaparin, 40 mg, Subcutaneous, Nightly  lisinopril, 5 mg, Oral, Q24H  pantoprazole, 40 mg, Oral, Q AM  sertraline, 25 mg, Oral, Daily      Continuous Infusions:   PRN Meds:.•  acetaminophen  •  albuterol  •  HYDROcodone-acetaminophen **OR** [DISCONTINUED] HYDROcodone-acetaminophen      RESULTS  No results found for: POCGLU  Results from last 7 days   Lab Units 09/22/22  0950 09/19/22  1100   WBC 10*3/mm3 6.14 7.22   HEMOGLOBIN g/dL 9.5* 9.2*   HEMATOCRIT % 28.1* 28.8*   PLATELETS 10*3/mm3 215 221     Results from last 7 days   Lab Units 09/22/22  0950 09/19/22  1100   SODIUM mmol/L 140 141   POTASSIUM mmol/L 3.8 4.5   CHLORIDE mmol/L 106 105   CO2 mmol/L 24.0 25.3   BUN mg/dL 10 10   CREATININE mg/dL 0.60 0.49*   CALCIUM mg/dL 9.0 9.0   GLUCOSE mg/dL 99 103*       Echocardiogram-September 1, 2022  Summary:                 The ejection fraction biplane was calculated at 65%.                            Intravenous contrast was used to enhance  endocardial border definition.                            The left ventricular chamber size, wall thickness, and systolic function are within normal limits. There are                            no regional wall motion abnormalities observed.                            Abnormal left ventricular diastolic filling consistent with impaired relaxation.                            The peak instantaneous gradient of the aortic valve is 28.7 mmHg. The mean gradient of the aortic valve                            is 16 mmHg.                            Mild aortic leaflet calcification.                            Mild aortic stenosis.                            Trace aortic regurgitation is noted.       ASSESSMENT and PLAN    Aneurysm (HCC)    Immobility Syndrome  1.  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 Will teach family colostomy care. Pain controlled. Encouraged to at least take pain meds prior to therapy    3.  HTN- stable on norvasc, lisinopril. Running low.  Decreased lisinopril to 5 mg.   September 16-blood pressure 128-131/60-63      4.  Depression - low dose zoloft    5.  DVT proph- was on lovenox-Will continue 40 mg daily for now    6. Pulm - nasal cannula O2. Wean, keep sats > 90%  September 13-Taper down to 2 L  September 16-sats 92% on room air    7. Right lateral elbow laceration - August 31 - sutured  September 16-as incisions located over a mobile joint, will continue sutures until Monday, September 19.  September 19-laceration not inspected today- will plan to assess on rounds tomorrow for suture removal  September 21-irregular edge to the traumatic laceration but it appears healed.  21 days sutured.  We will remove every other suture today and then the remainder in 2 days if integrity intact.  May have 1 additional suture under a scab.  -will try to get the note on placement of suture  September 22-remove remaining  sutures    8.  Orthostatic hypotension-she has bilateral lower extremity edema but will hold on adding diuretic given her orthostatic hypotension.  We will try thigh-high Jobst stockings 15-20 mm compression for both orthostasis as well as edema.  Echocardiogram recently showed ejection fraction 65%.     September 22-edema improved-continue compression stocking    TEAM CONF - SEPT 13 - TRANSFERS MOD . GAIT 8 FEET PARALLEL BARS MOD ASSIST. TOILET TRANSFERS MIN MOD. BATH MOD. LBD DEP. UBD MIN. EATING MIN. GROOMING MIN. TOILETING DEP. FATIGUES. USING PUREWICK.   OSTOMY EDUCATION. MEPILEX TO RIGHT ELBOW LACERATION. COGNITION - MILD TO MODERATE COGNITIVE IMPAIRMENT. VISUAL SPATIAL DEFICITS. OSTOMY EDUCATION. ABDOMINAL INCISION HEALING WITH JUST ONE SMALL AREA INFERIORLY WITH SLIGHT DRAINAGE. WILL DISCONTINUE PUREWICK. TO DO TIMED VOIDS.  PULMONARY - WEAN O2 .   ELOS - THREE WEEKS    September 16-Transfers contact-guard min assist.  Ambulated up to 45 feet contact-guard rolling walker.      TEAM CONF - SEPT 20 - TRANSFERS CTG MIN. CAR TRANSFERS MOD ASSIST.   GAIT 80 FEET RW CTG.   BATH MIN. LBD MIN UBD SBA. EATING SBA. TOILETING MIN ASSIST.   COGNITION - CUES TO GO STEP BY STEP AND NEEDS TO RE-READ DIRECTIONS. DEFICITS WITH WORKING MEMORY.  BNE (Active)  Att'n. - WNL  Exec. Fx. - Mildly Imp.  Rsng/Jgmnt - WNL  Arith - Mod. Imp.  Visuospatial Skills - Mildly Imp.  Visual Mem. - MIldly Imp.  Verbal Mem. - Mod. Imp., improved with cues  Emot - Pt reported improved mood  OCCASIONAL TEARFUL WITH PASTORAL COUNSELING.  EDUCATION ON COLOSTOMY.  EDEMA BETTER WITH COMPRESSION STOCKINGS.  ELOS - ONE WEEK.    Now admit for comprehensive acute inpatient rehabilitation .  This would be an interdisciplinary program with physical therapy 1.5 hour,  occupational therapy 1.5 hour,  5 days a week.  Rehabilitation nursing for carryover, monitoring of  GI   status, bowel and bladder, and skin  Ongoing physician follow-up.  Weekly team  conferences.  Goals are to achieve a level of supervision with  mobility and self-care and improved activity tolerance.   Rehabilitation prognosis air.  Medical prognosis fair.  Estimated length of stay is approximately 10 days , but is only an estimation.     The patient's functional status and clinical status is unchanged from preadmission assessment and the patient continues appropriate for acute inpatient rehabilitation.  Goal is for home with  Home health   therapies.  Barrier to discharge:  Impaired mobility and self care   - work on  Transfers, balance, gait, ADLS  to overcome.             Hossein Rivers MD      During rounds, used appropriate personal protective equipment including mask and gloves.  Additional gown if indicated.  Mask used was standard procedure mask. Appropriate PPE was worn during the entire visit.  Hand hygiene was completed before and after.

## 2022-09-23 NOTE — PROGRESS NOTES
Inpatient Rehabilitation Plan of Care Note    Plan of Care  Care Plan Reviewed - No updates at this time.    Pain    Performed Intervention(s)  Pain medication as requested and available  Repositioning and wound care      Sphincter Control    Performed Intervention(s)  Incontinance products and elo care  answer call light promptly  WCON consult  skin/stoma assessment      Psychosocial    Performed Intervention(s)  supportive family  Pt given opportunity to express concerns/feelings      Safety    Performed Intervention(s)  Personal items and call light w/in reach  falls precaution  hourly rounding    Signed by: Sancho Eugene RN

## 2022-09-24 PROCEDURE — 25010000002 ENOXAPARIN PER 10 MG: Performed by: PHYSICAL MEDICINE & REHABILITATION

## 2022-09-24 PROCEDURE — 97530 THERAPEUTIC ACTIVITIES: CPT

## 2022-09-24 RX ORDER — ALBUTEROL SULFATE 2.5 MG/3ML
2.5 SOLUTION RESPIRATORY (INHALATION) EVERY 6 HOURS PRN
Status: DISCONTINUED | OUTPATIENT
Start: 2022-09-24 | End: 2022-09-24 | Stop reason: SDUPTHER

## 2022-09-24 RX ADMIN — AMLODIPINE BESYLATE 5 MG: 5 TABLET ORAL at 07:56

## 2022-09-24 RX ADMIN — ENOXAPARIN SODIUM 40 MG: 100 INJECTION SUBCUTANEOUS at 20:00

## 2022-09-24 RX ADMIN — PANTOPRAZOLE SODIUM 40 MG: 40 TABLET, DELAYED RELEASE ORAL at 05:19

## 2022-09-24 RX ADMIN — Medication 2000 UNITS: at 07:56

## 2022-09-24 RX ADMIN — ATORVASTATIN CALCIUM 40 MG: 20 TABLET, FILM COATED ORAL at 20:00

## 2022-09-24 RX ADMIN — LISINOPRIL 5 MG: 5 TABLET ORAL at 07:57

## 2022-09-24 RX ADMIN — SERTRALINE 25 MG: 25 TABLET, FILM COATED ORAL at 07:57

## 2022-09-24 RX ADMIN — ACETAMINOPHEN 650 MG: 325 TABLET, FILM COATED ORAL at 07:57

## 2022-09-24 NOTE — PROGRESS NOTES
LOS: 15 days   Patient Care Team:  Wale Jacob MD as PCP - General (Family Medicine)      JASBIR MAHMOOD  1950         ADMITTING DIAGNOSIS:  Immobility syndrome    Subjective     Edema is improved in the legs.   She feels her endurance and strength is improving  Tolerating therapies.    No abdominal complaints other then working on the appliance    Objective     Vitals:    09/24/22 1100   BP: 103/57   Pulse: 82   Resp: 18   Temp: 97.8 °F (36.6 °C)   SpO2: 91%       PHYSICAL EXAM:     Awake, alert and 0x3  NAD  Heart: Regular rate and rhythm.  No rub murmur or gallop.  Lungs: Clear to auscultation bilaterally  Abdomen: Colostomy  Normoactive bowel sounds.  Soft and nontender    Ext:  Good strength bilaterally  Trace edema bilateral lower extremities    MEDICATIONS  Scheduled Meds:amLODIPine, 5 mg, Oral, Q24H  atorvastatin, 40 mg, Oral, Nightly  cholecalciferol, 2,000 Units, Oral, Daily  enoxaparin, 40 mg, Subcutaneous, Nightly  lisinopril, 5 mg, Oral, Q24H  pantoprazole, 40 mg, Oral, Q AM  sertraline, 25 mg, Oral, Daily      Continuous Infusions:   PRN Meds:.•  acetaminophen  •  albuterol  •  HYDROcodone-acetaminophen **OR** [DISCONTINUED] HYDROcodone-acetaminophen      RESULTS  No results found for: POCGLU  Results from last 7 days   Lab Units 09/22/22  0950 09/19/22  1100   WBC 10*3/mm3 6.14 7.22   HEMOGLOBIN g/dL 9.5* 9.2*   HEMATOCRIT % 28.1* 28.8*   PLATELETS 10*3/mm3 215 221     Results from last 7 days   Lab Units 09/22/22  0950 09/19/22  1100   SODIUM mmol/L 140 141   POTASSIUM mmol/L 3.8 4.5   CHLORIDE mmol/L 106 105   CO2 mmol/L 24.0 25.3   BUN mg/dL 10 10   CREATININE mg/dL 0.60 0.49*   CALCIUM mg/dL 9.0 9.0   GLUCOSE mg/dL 99 103*       Echocardiogram-September 1, 2022  Summary:                 The ejection fraction biplane was calculated at 65%.                            Intravenous contrast was used to enhance endocardial border definition.                            The left  ventricular chamber size, wall thickness, and systolic function are within normal limits. There are                            no regional wall motion abnormalities observed.                            Abnormal left ventricular diastolic filling consistent with impaired relaxation.                            The peak instantaneous gradient of the aortic valve is 28.7 mmHg. The mean gradient of the aortic valve                            is 16 mmHg.                            Mild aortic leaflet calcification.                            Mild aortic stenosis.                            Trace aortic regurgitation is noted.       ASSESSMENT and PLAN    Aneurysm (HCC)    Immobility Syndrome  1.  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 Will teach family colostomy care. Pain controlled. Encouraged to at least take pain meds prior to therapy    3.  HTN- stable on norvasc, lisinopril. Running low.  Decreased lisinopril to 5 mg.   September 16-blood pressure 128-131/60-63      4.  Depression - low dose zoloft    5.  DVT proph- was on lovenox-Will continue 40 mg daily for now    6. Pulm - nasal cannula O2. Wean, keep sats > 90%  September 13-Taper down to 2 L  September 16-sats 92% on room air    7. Right lateral elbow laceration - August 31 - sutured  September 16-as incisions located over a mobile joint, will continue sutures until Monday, September 19.  September 19-laceration not inspected today- will plan to assess on rounds tomorrow for suture removal  September 21-irregular edge to the traumatic laceration but it appears healed.  21 days sutured.  We will remove every other suture today and then the remainder in 2 days if integrity intact.  May have 1 additional suture under a scab.  -will try to get the note on placement of suture  September 22-remove remaining sutures    8.  Orthostatic hypotension-she has bilateral lower extremity  edema but will hold on adding diuretic given her orthostatic hypotension.  We will try thigh-high Jobst stockings 15-20 mm compression for both orthostasis as well as edema.  Echocardiogram recently showed ejection fraction 65%.     September 22-edema improved-continue compression stocking    TEAM [facility] - SEPT 13 - TRANSFERS MOD . GAIT 8 FEET PARALLEL BARS MOD ASSIST. TOILET TRANSFERS MIN MOD. BATH MOD. LBD DEP. UBD MIN. EATING MIN. GROOMING MIN. TOILETING DEP. FATIGUES. USING PUREWICK.   OSTOMY EDUCATION. MEPILEX TO RIGHT ELBOW LACERATION. COGNITION - MILD TO MODERATE COGNITIVE IMPAIRMENT. VISUAL SPATIAL DEFICITS. OSTOMY EDUCATION. ABDOMINAL INCISION HEALING WITH JUST ONE SMALL AREA INFERIORLY WITH SLIGHT DRAINAGE. WILL DISCONTINUE PUREWICK. TO DO TIMED VOIDS.  PULMONARY - WEAN O2 .   ELOS - THREE WEEKS    September 16-Transfers contact-guard min assist.  Ambulated up to 45 feet contact-guard rolling walker.      TEAM [facility] - SEPT 20 - TRANSFERS CTG MIN. CAR TRANSFERS MOD ASSIST.   GAIT 80 FEET RW CTG.   BATH MIN. LBD MIN UBD SBA. EATING SBA. TOILETING MIN ASSIST.   COGNITION - CUES TO GO STEP BY STEP AND NEEDS TO RE-READ DIRECTIONS. DEFICITS WITH WORKING MEMORY.  BNE (Active)  Att'n. - WNL  Exec. Fx. - Mildly Imp.  Rsng/Jgmnt - WNL  Arith - Mod. Imp.  Visuospatial Skills - Mildly Imp.  Visual Mem. - MIldly Imp.  Verbal Mem. - Mod. Imp., improved with cues  Emot - Pt reported improved mood  OCCASIONAL TEARFUL WITH PASTORAL COUNSELING.  EDUCATION ON COLOSTOMY.  EDEMA BETTER WITH COMPRESSION STOCKINGS.  ELOS - ONE WEEK.    Now admit for comprehensive acute inpatient rehabilitation .  This would be an interdisciplinary program with physical therapy 1.5 hour,  occupational therapy 1.5 hour,  5 days a week.  Rehabilitation nursing for carryover, monitoring of  GI   status, bowel and bladder, and skin  Ongoing physician follow-up.  Weekly team conferences.  Goals are to achieve a level of supervision with  mobility and  self-care and improved activity tolerance.   Rehabilitation prognosis air.  Medical prognosis fair.  Estimated length of stay is approximately 10 days , but is only an estimation.     The patient's functional status and clinical status is unchanged from preadmission assessment and the patient continues appropriate for acute inpatient rehabilitation.  Goal is for home with  Home health   therapies.  Barrier to discharge:  Impaired mobility and self care   - work on  Transfers, balance, gait, ADLS  to overcome.             Hossein Rivers MD      During rounds, used appropriate personal protective equipment including mask and gloves.  Additional gown if indicated.  Mask used was standard procedure mask. Appropriate PPE was worn during the entire visit.  Hand hygiene was completed before and after.

## 2022-09-24 NOTE — THERAPY TREATMENT NOTE
Inpatient Rehabilitation - Physical Therapy Treatment Note       [facility]     Patient Name: Summer Smith  : 1950  MRN: [id number]    Today's Date: 2022                    Admit Date: 2022      Visit Dx:     ICD-10-CM ICD-9-CM   1. Follow-up exam  Z09 V67.9       Patient Active Problem List   Diagnosis   • Aneurysm (HCC)       Past Medical History:   Diagnosis Date   • COPD (chronic obstructive pulmonary disease) (HCC)    • Elevated cholesterol    • GERD (gastroesophageal reflux disease)        Past Surgical History:   Procedure Laterality Date   • CARDIAC CATHETERIZATION     • COLON SURGERY     • COLONOSCOPY     • SKIN BIOPSY         PT ASSESSMENT (last 12 hours)     IRF PT Evaluation and Treatment     Row Name 22          PT Time and Intention    Document Type daily treatment  -AE     Mode of Treatment physical therapy  -AE     Patient/Family/Caregiver Comments/Observations pt upright eager for therapy  -AE     Row Name 22          General Information    Patient Profile Reviewed yes  -AE     General Observations of Patient asked for  and daughters to be able to ambulate her to bathroom. agreed and left yellow education sheet.  -AE     Existing Precautions/Restrictions fall  -AE     Limitations/Impairments safety/cognitive  -AE     Row Name 22          Pain Assessment    Pretreatment Pain Rating 0/10 - no pain  -AE     Posttreatment Pain Rating 0/10 - no pain  -AE     Row Name 22          Cognition/Psychosocial    Affect/Mental Status (Cognition) WFL  -AE     Orientation Status (Cognition) oriented x 3  -AE     Follows Commands (Cognition) follows one-step commands;over 90% accuracy  -AE     Personal Safety Interventions fall prevention program maintained;gait belt;muscle strengthening facilitated;nonskid shoes/slippers when out of bed;supervised activity  -AE     Row Name 22 08          Transfer Assessment/Treatment    Comment,  (Transfers) emphasized car transfers. pt able to lift legs independently first attempt. unable to second attempt. required a few attempts to stand from low car. educated to anterior weight shift and off load glutes prior to standing and pt able to perform with therapist holding down walker  -AE     Row Name 09/24/22 0800          Transfers    Bed-Chair Atkinson (Transfers) verbal cues;minimum assist (75% patient effort)  -AE     Chair-Bed Atkinson (Transfers) moderate assist (50% patient effort);verbal cues;nonverbal cues (demo/gesture)  -AE     Assistive Device (Bed-Chair Transfers) wheelchair  -AE     Sit-Stand Atkinson (Transfers) contact guard;verbal cues  -AE     Stand-Sit Atkinson (Transfers) contact guard;verbal cues  -AE     Row Name 09/24/22 0800          Chair-Bed Transfer    Assistive Device (Chair-Bed Transfers) wheelchair  -AE     Row Name 09/24/22 0800          Sit-Stand Transfer    Assistive Device (Sit-Stand Transfers) walker, front-wheeled  -AE     Row Name 09/24/22 0800          Stand-Sit Transfer    Assistive Device (Stand-Sit Transfers) walker, front-wheeled  -AE     Row Name 09/24/22 0800          Stand Pivot/Stand Step Transfer    Stand Pivot/Stand Step Atkinson (Transfers) standby assist  -AE     Assistive Device (Stand Pivot Stand Step Transfer) wheelchair  -AE     Row Name 09/24/22 0800          Car Transfer    Type (Car Transfer) sit-stand;stand-sit  -AE     Atkinson Level (Car Transfer) contact guard;verbal cues  -AE     Assistive Device (Car Transfer) walker, front-wheeled  -AE     Comment, (Car Transfer) 2-3 attempts  -AE     Row Name 09/24/22 0800          Gait/Stairs (Locomotion)    Atkinson Level (Gait) contact guard;verbal cues  -AE     Assistive Device (Gait) walker, front-wheeled  -AE     Distance in Feet (Gait) 60ft  -AE     Pattern (Gait) step-through  -AE     Deviations/Abnormal Patterns (Gait) base of support, narrow;hunter decreased  -AE      Bilateral Gait Deviations forward flexed posture;heel strike decreased  -AE     Real Level (Stairs) contact guard;minimum assist (75% patient effort)  -AE     Row Name 09/24/22 0800          Positioning and Restraints    Pre-Treatment Position sitting in chair/recliner  -AE     Post Treatment Position chair  -AE     In Chair sitting;call light within reach;encouraged to call for assist;exit alarm on  -AE           User Key  (r) = Recorded By, (t) = Taken By, (c) = Cosigned By    Initials Name Provider Type    AE Britta Garcia, PT Physical Therapist              Wound 09/09/22 2200 Right upper arm Laceration (Active)   Dressing Appearance open to air 09/24/22 0757   Closure Adhesive closure strips 09/24/22 0757   Base clean;dry;scab 09/24/22 0757   Drainage Amount none 09/24/22 0757   Care, Wound cleansed with;sterile normal saline 09/24/22 0757   Dressing Care open to air 09/24/22 0757       Wound 09/09/22 2200 Left midline abdomen Incision (Active)   Dressing Appearance open to air 09/24/22 0757   Closure Approximated 09/24/22 0757   Base clean;dry 09/24/22 0757   Drainage Amount none 09/24/22 0757   Care, Wound cleansed with;soap and water 09/24/22 0757   Dressing Care open to air 09/24/22 0757     Physical Therapy Education                 Title: PT OT SLP Therapies (Done)     Topic: Physical Therapy (Done)     Point: Mobility training (Done)     Learning Progress Summary           Patient Acceptance, E, VU by WN at 9/23/2022 2340   Family Acceptance, E, VU by WN at 9/23/2022 2340   Significant Other Acceptance, E, VU by WN at 9/23/2022 2340      Show all documentation for this point (18)                 Point: Home exercise program (Done)     Learning Progress Summary           Patient Acceptance, E, VU by WN at 9/23/2022 2340   Family Acceptance, E, VU by WN at 9/23/2022 2340   Significant Other Acceptance, E, VU by WN at 9/23/2022 2340      Show all documentation for this point (18)                  Point: Body mechanics (Done)     Learning Progress Summary           Patient Acceptance, E, VU by WN at 9/23/2022 2340   Family Acceptance, E, VU by WN at 9/23/2022 2340   Significant Other Acceptance, E, VU by WN at 9/23/2022 2340      Show all documentation for this point (18)                 Point: Precautions (Done)     Learning Progress Summary           Patient Acceptance, E, VU by WN at 9/23/2022 2340   Family Acceptance, E, VU by WN at 9/23/2022 2340   Significant Other Acceptance, E, VU by WN at 9/23/2022 2340      Show all documentation for this point (19)                             User Key     Initials Effective Dates Name Provider Type Discipline     08/23/22 -  Sancho Eugene RN Registered Nurse Nurse                PT Recommendation and Plan                          Time Calculation:      PT Charges     Row Name 09/24/22 1145             Time Calculation    Start Time 1000  -AE      Stop Time 1030  -AE      Time Calculation (min) 30 min  -AE      PT Received On 09/24/22  -AE      PT - Next Appointment 09/26/22  -AE              Time Calculation- PT    Total Timed Code Minutes- PT 30 minute(s)  -AE            User Key  (r) = Recorded By, (t) = Taken By, (c) = Cosigned By    Initials Name Provider Type    AE Britta Garcia, MARIBEL Physical Therapist                Therapy Charges for Today     Code Description Service Date Service Provider Modifiers Qty    70491634330 HC PT THERAPEUTIC ACT EA 15 MIN 9/24/2022 Britta Garcia, PT GP 2    12195042046 HC PT THER SUPP EA 15 MIN 9/24/2022 Britta Garcia PT GP 1                   Britta Garcia PT  9/24/2022

## 2022-09-24 NOTE — PLAN OF CARE
Goal Outcome Evaluation:  Plan of Care Reviewed With: patient, spouse           Outcome Evaluation: pt appears with no distress noted, spouse at the bedside, BLE swelling and edema resolved, ace wraps applied, colostomy bag changed and appears intact, currently denies any abdominal symptoms, education on home self care and follow up appointments completed, all concerns addressed, all safety precautions in place, will continue to monitor pt.

## 2022-09-24 NOTE — PLAN OF CARE
Goal Outcome Evaluation:           Progress: improving  Outcome Evaluation: A&OX4. Calm, cooperative, and pleasant. Forgetful at times.  at bedside. Participated fully in therapy. Aneurysm. Abdominal incision. R. elbow has an incision, SPRING, scabbed. Wound care completed. Colostomy to L. lower abdomen. Colostomy bag last changed y/d evening by wound/ostomy nurse. One-piece with belt: extra appliances in her room. Wears glasses. Diet Reg, thins. Pain meds given for HA today. Meds whole with water. Continent B&B. Frequency. F/C 9/26 at 11AM. D/C Wed. 9/28.  has been checked off to transfer patient and ambulate in her room.

## 2022-09-25 PROCEDURE — 25010000002 ENOXAPARIN PER 10 MG: Performed by: PHYSICAL MEDICINE & REHABILITATION

## 2022-09-25 RX ORDER — HYDROCHLOROTHIAZIDE 12.5 MG/1
12.5 TABLET ORAL ONCE
Status: COMPLETED | OUTPATIENT
Start: 2022-09-25 | End: 2022-09-25

## 2022-09-25 RX ADMIN — Medication 2000 UNITS: at 09:22

## 2022-09-25 RX ADMIN — SERTRALINE 25 MG: 25 TABLET, FILM COATED ORAL at 09:22

## 2022-09-25 RX ADMIN — LISINOPRIL 5 MG: 5 TABLET ORAL at 09:22

## 2022-09-25 RX ADMIN — ENOXAPARIN SODIUM 40 MG: 100 INJECTION SUBCUTANEOUS at 19:54

## 2022-09-25 RX ADMIN — AMLODIPINE BESYLATE 5 MG: 5 TABLET ORAL at 09:22

## 2022-09-25 RX ADMIN — HYDROCHLOROTHIAZIDE 12.5 MG: 12.5 TABLET ORAL at 17:29

## 2022-09-25 RX ADMIN — ATORVASTATIN CALCIUM 40 MG: 20 TABLET, FILM COATED ORAL at 19:54

## 2022-09-25 RX ADMIN — PANTOPRAZOLE SODIUM 40 MG: 40 TABLET, DELAYED RELEASE ORAL at 09:22

## 2022-09-25 NOTE — PROGRESS NOTES
LOS: 16 days   Patient Care Team:  Wale Jacob MD as PCP - General (Family Medicine)      JASBIR MAHMOOD  1950         ADMITTING DIAGNOSIS:  Immobility syndrome    Subjective      Feels strength and endurance better  Comfortable with her breathing    Objective     Vitals:    09/25/22 0500   BP: 141/89   Pulse: 71   Resp: 18   Temp: 98.9 °F (37.2 °C)   SpO2: 91%       PHYSICAL EXAM:     Awake, alert and 0x3  NAD  Heart: Regular rate and rhythm.  No rub murmur or gallop.  Lungs: Mild right basilar crackles  Abdomen: Colostomy  Normoactive bowel sounds.  Soft and nontender    Ext:  Good strength bilaterally  Trace edema bilateral lower extremities    MEDICATIONS  Scheduled Meds:amLODIPine, 5 mg, Oral, Q24H  atorvastatin, 40 mg, Oral, Nightly  cholecalciferol, 2,000 Units, Oral, Daily  enoxaparin, 40 mg, Subcutaneous, Nightly  hydroCHLOROthiazide Oral, 12.5 mg, Oral, Once  lisinopril, 5 mg, Oral, Q24H  pantoprazole, 40 mg, Oral, Q AM  sertraline, 25 mg, Oral, Daily      Continuous Infusions:   PRN Meds:.•  acetaminophen  •  albuterol  •  HYDROcodone-acetaminophen **OR** [DISCONTINUED] HYDROcodone-acetaminophen      RESULTS  No results found for: POCGLU  Results from last 7 days   Lab Units 09/22/22  0950 09/19/22  1100   WBC 10*3/mm3 6.14 7.22   HEMOGLOBIN g/dL 9.5* 9.2*   HEMATOCRIT % 28.1* 28.8*   PLATELETS 10*3/mm3 215 221     Results from last 7 days   Lab Units 09/22/22  0950 09/19/22  1100   SODIUM mmol/L 140 141   POTASSIUM mmol/L 3.8 4.5   CHLORIDE mmol/L 106 105   CO2 mmol/L 24.0 25.3   BUN mg/dL 10 10   CREATININE mg/dL 0.60 0.49*   CALCIUM mg/dL 9.0 9.0   GLUCOSE mg/dL 99 103*       Echocardiogram-September 1, 2022  Summary:                 The ejection fraction biplane was calculated at 65%.                            Intravenous contrast was used to enhance endocardial border definition.                            The left ventricular chamber size, wall thickness, and systolic function  are within normal limits. There are                            no regional wall motion abnormalities observed.                            Abnormal left ventricular diastolic filling consistent with impaired relaxation.                            The peak instantaneous gradient of the aortic valve is 28.7 mmHg. The mean gradient of the aortic valve                            is 16 mmHg.                            Mild aortic leaflet calcification.                            Mild aortic stenosis.                            Trace aortic regurgitation is noted.       ASSESSMENT and PLAN    Aneurysm (HCC)    Immobility Syndrome  1.  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 Will teach family colostomy care. Pain controlled. Encouraged to at least take pain meds prior to therapy    3.  HTN- stable on norvasc, lisinopril. Running low.  Decreased lisinopril to 5 mg.   September 16-blood pressure 128-131/60-63      4.  Depression - low dose zoloft    5.  DVT proph- was on lovenox-Will continue 40 mg daily for now    6. Pulm - nasal cannula O2. Wean, keep sats > 90%  September 13-Taper down to 2 L  September 16-sats 92% on room air    7. Right lateral elbow laceration - August 31 - sutured  September 16-as incisions located over a mobile joint, will continue sutures until Monday, September 19.  September 19-laceration not inspected today- will plan to assess on rounds tomorrow for suture removal  September 21-irregular edge to the traumatic laceration but it appears healed.  21 days sutured.  We will remove every other suture today and then the remainder in 2 days if integrity intact.  May have 1 additional suture under a scab.  -will try to get the note on placement of suture  September 22-remove remaining sutures    8.  Orthostatic hypotension-she has bilateral lower extremity edema but will hold on adding diuretic given her orthostatic  hypotension.  We will try thigh-high Jobst stockings 15-20 mm compression for both orthostasis as well as edema.  Echocardiogram recently showed ejection fraction 65%.     September 22-edema improved-continue compression stocking    9 Edema - Jobst - better  Sept 25 - right basilar crackles today. Hydrochlorothiazide 12.5 mg x one dose today.    TEAM [facility] - SEPT 13 - TRANSFERS MOD . GAIT 8 FEET PARALLEL BARS MOD ASSIST. TOILET TRANSFERS MIN MOD. BATH MOD. LBD DEP. UBD MIN. EATING MIN. GROOMING MIN. TOILETING DEP. FATIGUES. USING PUREWICK.   OSTOMY EDUCATION. MEPILEX TO RIGHT ELBOW LACERATION. COGNITION - MILD TO MODERATE COGNITIVE IMPAIRMENT. VISUAL SPATIAL DEFICITS. OSTOMY EDUCATION. ABDOMINAL INCISION HEALING WITH JUST ONE SMALL AREA INFERIORLY WITH SLIGHT DRAINAGE. WILL DISCONTINUE PUREWICK. TO DO TIMED VOIDS.  PULMONARY - WEAN O2 .   ELOS - THREE WEEKS    September 16-Transfers contact-guard min assist.  Ambulated up to 45 feet contact-guard rolling walker.      TE[facility] - SEPT 20 - TRANSFERS CTG MIN. CAR TRANSFERS MOD ASSIST.   GAIT 80 FEET RW CTG.   BATH MIN. LBD MIN UBD SBA. EATING SBA. TOILETING MIN ASSIST.   COGNITION - CUES TO GO STEP BY STEP AND NEEDS TO RE-READ DIRECTIONS. DEFICITS WITH WORKING MEMORY.  BNE (Active)  Att'n. - WNL  Exec. Fx. - Mildly Imp.  Rsng/Jgmnt - WNL  Arith - Mod. Imp.  Visuospatial Skills - Mildly Imp.  Visual Mem. - MIldly Imp.  Verbal Mem. - Mod. Imp., improved with cues  Emot - Pt reported improved mood  OCCASIONAL TEARFUL WITH PASTORAL COUNSELING.  EDUCATION ON COLOSTOMY.  EDEMA BETTER WITH COMPRESSION STOCKINGS.  ELOS - ONE WEEK.    Now admit for comprehensive acute inpatient rehabilitation .  This would be an interdisciplinary program with physical therapy 1.5 hour,  occupational therapy 1.5 hour,  5 days a week.  Rehabilitation nursing for carryover, monitoring of  GI   status, bowel and bladder, and skin  Ongoing physician follow-up.  Weekly team conferences.  Goals are to  achieve a level of supervision with  mobility and self-care and improved activity tolerance.   Rehabilitation prognosis air.  Medical prognosis fair.  Estimated length of stay is approximately 10 days , but is only an estimation.     The patient's functional status and clinical status is unchanged from preadmission assessment and the patient continues appropriate for acute inpatient rehabilitation.  Goal is for home with  Home health   therapies.  Barrier to discharge:  Impaired mobility and self care   - work on  Transfers, balance, gait, ADLS  to overcome.             Hossein Rivers MD      During rounds, used appropriate personal protective equipment including mask and gloves.  Additional gown if indicated.  Mask used was standard procedure mask. Appropriate PPE was worn during the entire visit.  Hand hygiene was completed before and after.

## 2022-09-25 NOTE — PROGRESS NOTES
Inpatient Rehabilitation Plan of Care Note    Plan of Care  Care Plan Reviewed - No updates at this time.    Pain    Performed Intervention(s)  Pain medication as requested and available  Repositioning and wound care      Sphincter Control    Performed Intervention(s)  Incontinance products and elo care  answer call light promptly  WCON consult  skin/stoma assessment      Psychosocial    Performed Intervention(s)  supportive family  Pt given opportunity to express concerns/feelings      Safety    Performed Intervention(s)  Personal items and call light w/in reach  falls precaution  hourly rounding    Signed by: Esme Quintana RN

## 2022-09-25 NOTE — PLAN OF CARE
Goal Outcome Evaluation:         A&Ox4. Cooperative. Cont bladder. Bowel, colostomy LLQ. Meds whole with water. Abdominal incision healing and SPRING. Right elbow wound SPRING. Daughter at bedside. Pt sleeping in recliner. Sleeping well. Family conference scheduled 9/26 at 1100. No unsafe behavior.

## 2022-09-25 NOTE — PLAN OF CARE
Goal Outcome Evaluation:  Plan of Care Reviewed With: patient        Progress: improving  Outcome Evaluation: AAOx4. Cooperative with all care. Family cleared to transfer patient. No safety concerns noted. Up assist of 1, walker. Colostomy emptied twice; soft formed stool. VS stable. No new skin concerns. Patient mentioned tenderness and firmness around abdominal incision; Dr. Rivers assessed; likely scar tissue. No localized redness or warmth noted.

## 2022-09-26 LAB
ANION GAP SERPL CALCULATED.3IONS-SCNC: 9.2 MMOL/L (ref 5–15)
BASOPHILS # BLD AUTO: 0.04 10*3/MM3 (ref 0–0.2)
BASOPHILS NFR BLD AUTO: 0.7 % (ref 0–1.5)
BUN SERPL-MCNC: 11 MG/DL (ref 8–23)
BUN/CREAT SERPL: 22 (ref 7–25)
CALCIUM SPEC-SCNC: 9.2 MG/DL (ref 8.6–10.5)
CHLORIDE SERPL-SCNC: 103 MMOL/L (ref 98–107)
CO2 SERPL-SCNC: 25.8 MMOL/L (ref 22–29)
CREAT SERPL-MCNC: 0.5 MG/DL (ref 0.57–1)
DEPRECATED RDW RBC AUTO: 43.1 FL (ref 37–54)
EGFRCR SERPLBLD CKD-EPI 2021: 101 ML/MIN/1.73
EOSINOPHIL # BLD AUTO: 0.19 10*3/MM3 (ref 0–0.4)
EOSINOPHIL NFR BLD AUTO: 3.1 % (ref 0.3–6.2)
ERYTHROCYTE [DISTWIDTH] IN BLOOD BY AUTOMATED COUNT: 13.4 % (ref 12.3–15.4)
GLUCOSE SERPL-MCNC: 118 MG/DL (ref 65–99)
HCT VFR BLD AUTO: 27.7 % (ref 34–46.6)
HGB BLD-MCNC: 9.3 G/DL (ref 12–15.9)
LYMPHOCYTES # BLD AUTO: 1.49 10*3/MM3 (ref 0.7–3.1)
LYMPHOCYTES NFR BLD AUTO: 24.7 % (ref 19.6–45.3)
MCH RBC QN AUTO: 29.4 PG (ref 26.6–33)
MCHC RBC AUTO-ENTMCNC: 33.6 G/DL (ref 31.5–35.7)
MCV RBC AUTO: 87.7 FL (ref 79–97)
MONOCYTES # BLD AUTO: 0.49 10*3/MM3 (ref 0.1–0.9)
MONOCYTES NFR BLD AUTO: 8.1 % (ref 5–12)
NEUTROPHILS NFR BLD AUTO: 3.81 10*3/MM3 (ref 1.7–7)
NEUTROPHILS NFR BLD AUTO: 63.1 % (ref 42.7–76)
PLATELET # BLD AUTO: 278 10*3/MM3 (ref 140–450)
PMV BLD AUTO: 10.9 FL (ref 6–12)
POTASSIUM SERPL-SCNC: 3.2 MMOL/L (ref 3.5–5.2)
RBC # BLD AUTO: 3.16 10*6/MM3 (ref 3.77–5.28)
SODIUM SERPL-SCNC: 138 MMOL/L (ref 136–145)
WBC NRBC COR # BLD: 6.04 10*3/MM3 (ref 3.4–10.8)

## 2022-09-26 PROCEDURE — 97130 THER IVNTJ EA ADDL 15 MIN: CPT

## 2022-09-26 PROCEDURE — 97116 GAIT TRAINING THERAPY: CPT

## 2022-09-26 PROCEDURE — 97535 SELF CARE MNGMENT TRAINING: CPT

## 2022-09-26 PROCEDURE — 97110 THERAPEUTIC EXERCISES: CPT

## 2022-09-26 PROCEDURE — 80048 BASIC METABOLIC PNL TOTAL CA: CPT | Performed by: PHYSICAL MEDICINE & REHABILITATION

## 2022-09-26 PROCEDURE — 85025 COMPLETE CBC W/AUTO DIFF WBC: CPT | Performed by: PHYSICAL MEDICINE & REHABILITATION

## 2022-09-26 PROCEDURE — 25010000002 ENOXAPARIN PER 10 MG: Performed by: PHYSICAL MEDICINE & REHABILITATION

## 2022-09-26 PROCEDURE — 97129 THER IVNTJ 1ST 15 MIN: CPT

## 2022-09-26 RX ORDER — POTASSIUM CHLORIDE 750 MG/1
40 TABLET, FILM COATED, EXTENDED RELEASE ORAL ONCE
Status: COMPLETED | OUTPATIENT
Start: 2022-09-26 | End: 2022-09-26

## 2022-09-26 RX ADMIN — ENOXAPARIN SODIUM 40 MG: 100 INJECTION SUBCUTANEOUS at 20:42

## 2022-09-26 RX ADMIN — ATORVASTATIN CALCIUM 40 MG: 20 TABLET, FILM COATED ORAL at 20:42

## 2022-09-26 RX ADMIN — Medication 2000 UNITS: at 07:45

## 2022-09-26 RX ADMIN — AMLODIPINE BESYLATE 5 MG: 5 TABLET ORAL at 07:45

## 2022-09-26 RX ADMIN — POTASSIUM CHLORIDE 40 MEQ: 750 TABLET, EXTENDED RELEASE ORAL at 12:01

## 2022-09-26 RX ADMIN — LISINOPRIL 5 MG: 5 TABLET ORAL at 07:45

## 2022-09-26 RX ADMIN — SERTRALINE 25 MG: 25 TABLET, FILM COATED ORAL at 07:45

## 2022-09-26 RX ADMIN — ACETAMINOPHEN 650 MG: 325 TABLET, FILM COATED ORAL at 07:45

## 2022-09-26 RX ADMIN — PANTOPRAZOLE SODIUM 40 MG: 40 TABLET, DELAYED RELEASE ORAL at 05:00

## 2022-09-26 NOTE — PROGRESS NOTES
Case Management  Inpatient Rehabilitation Plan of Care and Discharge Plan Note    Rehabilitation Diagnosis:  Branch  Date of Onset:  Branch    Medical Summary:  Branch  Past Medical History: Branch    Plan of Care  Updated Problems/Interventions  Field    Expected Intensity:  Branch  Interdisciplinary Team:  Celine  Estimated Length of Stay/Anticipated Discharge Date: Branch  Anticipated Discharge Destination:  Anticipated discharge destination from inpatient rehabilitation is community  discharge with assistance. Patient lives with  in one story home with  ramp entrance. One step from porch into home.  Family conference held with patient,  and dgts on 9/26.  D/C plan is home with  and intermittent assist from daughters who live  close by.  [facility]  to provide [facility] and [facility]G.      Based on the patient's medical and functional status, their prognosis and  expected level of functional improvement is:  Branch    Signed by: PAU Manriquez

## 2022-09-26 NOTE — PROGRESS NOTES
"Nutrition Services    Patient Name:  Summer Smith  YOB: 1950  MRN: [id number]  Admit Date:  9/9/2022      PROGRESS NOTE - REHAB    Comments: Follow up    Encounter Information        Trending Narrative Eating well, DC planned for 9/28.      Current Nutrition Orders & Evaluation of Intake       Oral Nutrition     Food Allergies NKFA   Current PO Diet Diet Regular; Thin   Supplement Boost plus TID   PO Evaluation    Trending % PO Intake % x 3 days   Factors Affecting Intake  No factors at this time   --  Anthropometrics         Height   Weight Height: 177.8 cm (70\")  Weight: 115 kg (252 lb 10.4 oz) (09/09/22 2158)   BMI kg/m2 Body mass index is 36.25 kg/m².   Weight trend No weight hx     Physical Findings          Physical Appearance alert, hard of hearing, oriented, overweight, room air, 1+ edema   Gastrointestinal Last BM: 9/25, colostomy LLQ   Skin Left midline abdomen incision   --  Labs       Pertinent Labs Reviewed, listed below     Results from last 7 days   Lab Units 09/26/22  0806 09/22/22  0950   SODIUM mmol/L 138 140   POTASSIUM mmol/L 3.2* 3.8   CHLORIDE mmol/L 103 106   CO2 mmol/L 25.8 24.0   BUN mg/dL 11 10   CREATININE mg/dL 0.50* 0.60   CALCIUM mg/dL 9.2 9.0   GLUCOSE mg/dL 118* 99     Results from last 7 days   Lab Units 09/26/22  0806   HEMOGLOBIN g/dL 9.3*   HEMATOCRIT % 27.7*   WBC 10*3/mm3 6.04     Results from last 7 days   Lab Units 09/26/22  0806 09/22/22  0950   PLATELETS 10*3/mm3 278 215     No results found for: COVID19  No results found for: HGBA1C       Medications           Scheduled Medications amLODIPine, 5 mg, Oral, Q24H  atorvastatin, 40 mg, Oral, Nightly  cholecalciferol, 2,000 Units, Oral, Daily  enoxaparin, 40 mg, Subcutaneous, Nightly  lisinopril, 5 mg, Oral, Q24H  pantoprazole, 40 mg, Oral, Q AM  sertraline, 25 mg, Oral, Daily       Infusions     PRN Medications •  acetaminophen  •  albuterol  •  HYDROcodone-acetaminophen **OR** [DISCONTINUED] " HYDROcodone-acetaminophen     Intervention Goal        Intervention Goal(s) Maintain nutrition status, Reduce/improve symptoms, Disease management/therapy, Tolerate PO , Continue positive trend and PO intake goal %: 75     Nutrition Intervention        RD Action Menu provided, encourage intake, plan of care reviewed       Recommendations        Diet Prescription    Supplement Prescription    Prescription Ordered    --  Monitor/Evaluation        Monitor Per protocol, PO intake, Supplement intake, Pertinent labs, Weight, Skin status, GI status, Symptoms, POC/GOC         Electronically signed by:  Mara Chappell RD  09/26/22 12:28 EDT

## 2022-09-26 NOTE — THERAPY TREATMENT NOTE
Inpatient Rehabilitation - Occupational Therapy Treatment Note    [facility]     Patient Name: Summer Smith  : 1950  MRN: [id number]    Today's Date: 2022                 Admit Date: 2022         ICD-10-CM ICD-9-CM   1. Follow-up exam  Z09 V67.9       Patient Active Problem List   Diagnosis   • Aneurysm (HCC)       Past Medical History:   Diagnosis Date   • COPD (chronic obstructive pulmonary disease) (HCC)    • Elevated cholesterol    • GERD (gastroesophageal reflux disease)        Past Surgical History:   Procedure Laterality Date   • CARDIAC CATHETERIZATION     • COLON SURGERY     • COLONOSCOPY     • SKIN BIOPSY               IRF OT ASSESSMENT FLOWSHEET (last 12 hours)     IRF OT Evaluation and Treatment     Row Name 22 1148          OT Time and Intention    Document Type progress note  -DN     Mode of Treatment occupational therapy  -DN     Patient Effort good  -DN     Row Name 22 1148          Pain Assessment    Pretreatment Pain Rating 0/10 - no pain  -DN     Posttreatment Pain Rating 0/10 - no pain  -DN     Row Name 22 1148          Bathing    [place] Level (Bathing) bathing skills;supervision;verbal cues  -DN     Assistive Device (Bathing) long-handled sponge  -DN     Position (Bathing) supported sitting;supported standing  -DN     Set-up Assistance (Bathing) obtain supplies  -DN     Row Name 22 1148          Upper Body Dressing    [place] Level (Upper Body Dressing) upper body dressing skills;doff;don;pull over garment;set up assistance  -DN     Position (Upper Body Dressing) supported sitting  -DN     Set-up Assistance (Upper Body Dressing) obtain clothing  -DN     Comment (Upper Body Dressing) no bra  -DN     Row Name 22 1148          Lower Body Dressing    [place] Level (Lower Body Dressing) doff;don;pants/bottoms;shoes/slippers;socks;underwear;supervision;verbal cues  -DN     Assistive Device Use (Lower Body Dressing) --  did not  need to use reacher or shoe horn today, still setup for thigh high jobsts  -DN     Position (Lower Body Dressing) supported standing;supported sitting  -DN     Set-up Assistance (Lower Body Dressing) obtain clothing  -DN     Comment (Lower Body Dressing) RWX in front of pt during LE dressing  -DN     Row Name 09/26/22 1148          Grooming    [place] Level (Grooming) grooming skills;deodorant application;hair care, combing/brushing;oral care regimen;supervision  -DN     Position (Grooming) supported sitting  -DN     Row Name 09/26/22 1148          Toileting    [place] Level (Toileting) toileting skills;adjust/manage clothing;perform perineal hygiene;supervision;verbal cues  -DN     Assistive Device Use (Toileting) grab bar/safety frame;raised toilet seat  -DN     Comment (Toileting) pt is dependent with colostomy care but sba with void and clothing mgmt  -DN     Row Name 09/26/22 1148          Transfers    [place] Level (Toilet Transfer) supervision;verbal cues  -DN     Assistive Device (Toilet Transfer) grab bars/safety frame;raised toilet seat;walker, front-wheeled  -DN     [place] Level (Shower Transfer) contact guard;verbal cues  -DN     Assistive Device (Shower Transfer) grab bar, tub/shower;tub bench;walker, front-wheeled  -DN     Row Name 09/26/22 1148          Toilet Transfer    Type (Toilet Transfer) stand pivot/stand step  -DN     Row Name 09/26/22 1148          Shower Transfer    Type (Shower Transfer) stand pivot/stand step  -DN     Row Name 09/26/22 1148          Positioning and Restraints    Pre-Treatment Position sitting in chair/recliner  -DN     Post Treatment Position wheelchair  -DN     In Bed sitting;call light within reach;encouraged to call for assist;exit alarm on  -DN           User Key  (r) = Recorded By, (t) = Taken By, (c) = Cosigned By    Initials Name Effective Dates    Sina Currie OT 06/16/21 -                  Occupational Therapy Education                  Title: PT OT SLP Therapies (Done)     Topic: Occupational Therapy (Done)     Point: ADL training (Done)     Description:   Instruct learner(s) on proper safety adaptation and remediation techniques during self care or transfers.   Instruct in proper use of assistive devices.              Learning Progress Summary           Patient Acceptance, E,D, VU by DN at 9/26/2022 1154    Comment: Pt and family attended family conferance today to disciss current status with adls, functional transfers, cognitive status, use of DME, and HEP.  Pt dgt and  shown toilet safety frame and transfer tub bench for pt at d/c, family plans to purchace   Family Acceptance, E,D, VU by DN at 9/26/2022 1154    Comment: Pt and family attended family conferance today to disciss current status with adls, functional transfers, cognitive status, use of DME, and HEP.  Pt dgt and  shown toilet safety frame and transfer tub bench for pt at d/c, family plans to purchace   Significant Other Acceptance, E, VU by WN at 9/23/2022 2340      Show all documentation for this point (5)                 Point: Home exercise program (Done)     Description:   Instruct learner(s) on appropriate technique for monitoring, assisting and/or progressing therapeutic exercises/activities.              Learning Progress Summary           Patient Acceptance, E,D, VU by DN at 9/26/2022 1154    Comment: Pt and family attended family conferance today to disciss current status with adls, functional transfers, cognitive status, use of DME, and HEP.  Pt dgt and  shown toilet safety frame and transfer tub bench for pt at d/c, family plans to purchace   Family Acceptance, E,D, VU by DN at 9/26/2022 1154    Comment: Pt and family attended family conferance today to disciss current status with adls, functional transfers, cognitive status, use of DME, and HEP.  Pt dgt and  shown toilet safety frame and transfer tub bench for pt at d/c, family plans to  purchace   Significant Other Acceptance, E, VU by WN at 9/23/2022 2340      Show all documentation for this point (5)                 Point: Precautions (Done)     Description:   Instruct learner(s) on prescribed precautions during self-care and functional transfers.              Learning Progress Summary           Patient Acceptance, E,D, VU by DN at 9/26/2022 1154    Comment: Pt and family attended family conferance today to disciss current status with adls, functional transfers, cognitive status, use of DME, and HEP.  Pt dgt and  shown toilet safety frame and transfer tub bench for pt at d/c, family plans to purchace   Family Acceptance, E,D, VU by DN at 9/26/2022 1154    Comment: Pt and family attended family conferance today to disciss current status with adls, functional transfers, cognitive status, use of DME, and HEP.  Pt dgt and  shown toilet safety frame and transfer tub bench for pt at d/, family plans to purchace   Significant Other Acceptance, E, VU by WN at 9/23/2022 2340      Show all documentation for this point (5)                 Point: Body mechanics (Done)     Description:   Instruct learner(s) on proper positioning and spine alignment during self-care, functional mobility activities and/or exercises.              Learning Progress Summary           Patient Acceptance, E,D, VU by DN at 9/26/2022 1154    Comment: Pt and family attended family conferance today to disciss current status with adls, functional transfers, cognitive status, use of DME, and HEP.  Pt dgt and  shown toilet safety frame and transfer tub bench for pt at d/c, family plans to purchace   Family Acceptance, E,D, VU by DN at 9/26/2022 1154    Comment: Pt and family attended family conferance today to disciss current status with adls, functional transfers, cognitive status, use of DME, and HEP.  Pt dgt and  shown toilet safety frame and transfer tub bench for pt at d/c, family plans to purchace    Significant Other Acceptance, E, VU by ROSETTE at 9/23/2022 2340      Show all documentation for this point (5)                             User Key     Initials Effective Dates Name Provider Type Discipline    DN 06/16/21 -  Sina Walls OT Occupational Therapist OT    WN 08/23/22 -  Sancho Eugene RN Registered Nurse Nurse                    OT Recommendation and Plan                         Time Calculation:      Time Calculation- OT     Row Name 09/26/22 1130 09/26/22 0800          Time Calculation- OT    OT Start Time 1130  -DN 0800  -DN     OT Stop Time 1145  -DN 0900  -DN     OT Time Calculation (min) 15 min  -DN 60 min  -DN           User Key  (r) = Recorded By, (t) = Taken By, (c) = Cosigned By    Initials Name Provider Type    Sina Currie OT Occupational Therapist              Therapy Charges for Today     Code Description Service Date Service Provider Modifiers Qty    [id number] HC OT SELF CARE/MGMT/TRAIN EA 15 MIN 9/26/2022 Sina Walls OT GO 5                   Sina Walls OT  9/26/2022

## 2022-09-26 NOTE — PROGRESS NOTES
Inpatient Rehabilitation Plan of Care Note    Plan of Care  Care Plan Reviewed - Updates as Follows    Pain    [RN] Pain Management(Active)  Current Status(09/26/2022): Pain currently at tolerable rate  Weekly Goal(10/03/2022): Pain will be managed at tolerable rate  Discharge Goal: Patient will be able to tolerate pain and manage    Performed Intervention(s)  Pain medication as requested and available  Repositioning and wound care      Psychosocial    [RN] Coping/Adjustment(Active)  Current Status(09/26/2022): Pt is adjusting to bowel resection/colostomy  Weekly Goal(10/03/2022): Patient adjusted to new bowel regimen  Discharge Goal: Patient coping well with new colostomy    Performed Intervention(s)  supportive family  Pt given opportunity to express concerns/feelings      Safety    [RN] Potential for Injury(Active)  Current Status(09/26/2022): Patient at risk for falls due to weakness from  abdominal surger  Weekly Goal(10/03/2022): Patient will use call light appropriately  Discharge Goal: Patient will be aware of risk of falls    Performed Intervention(s)  Personal items and call light w/in reach  falls precaution  hourly rounding      Sphincter Control    [RN] Bladder Management(Active)  Current Status(09/26/2022): Continent of Bladder mostly  Weekly Goal(10/03/2022): Pt continent of bladder  Discharge Goal: Pt continent 100%    [RN] Bowel Management(Active)  Current Status(09/26/2022): Patient has a new colostomy  Weekly Goal(10/03/2022): Patient will maintain skin integrity around stoma  Discharge Goal: Patient will demonstrate techniques to maintain skin integrity  and change appliance independently    Performed Intervention(s)  Incontinance products and elo care  answer call light promptly  WCON consult  skin/stoma assessment    Signed by: Joana Mahan RN

## 2022-09-26 NOTE — PROGRESS NOTES
Family conference held today with patient, , and daughter, Marlen, and other daughter on phone. PT, OT, ST, NSG, and SW present. Discussed progress and d/c recommendations.  PT reported patient has made good progress with her mobility. Patient at SBA level with bed mobility and transfers. Patient does require verbal cues to push up from seated position vs grabbing for walker. Patient transfers in/out of car with SBA. Patient walking with rolling walker and SBA. PT recommended patient continue to use rolling walker at home. Patient requires CGA to go up/down curb step.     OT reported patient able to bathe seated on shower chair in shower with SBA and using long handled sponge for feet. Patient transfers to shower chair with CGA. Patient dresses UE and LE with set up but does need help with JOBST stockings. Patient transfers to commode with SBA but requires Mod assist with toileting. Patient is dependent with colostomy care. OT recommended patient continue to use shower chair and stay seated for bathing at home.     ST reported working on problem solving, reasoning, memory tasks. Patient has improved in all areas. Patient occasionally needs cues for attention to details. Recommended patient have supervision with medication and finance management.     NSG reviewed medications. They would like new meds sent to her pharmacy vs using Meds to Bed. Patient no longer needing O2. Discussed ostomy nurse following patient for ostomy care while here. Nurse has shown daughter and  how to change colostomy but now has different bag and belt, which has worked better for patient over last 4-5 days. Will have ostomy nurse come today to do teaching with  and daughter. Discussed HH nurse to continue to do teaching at home. Discussed follow up with PCP, neurosurgeon, and surgeon who did colostomy at [facility].     Recommended home health PT and NSG. Patient/family prefer to use [facility].  Referral made. OT to  give home exercise program.    Discussed equipment for home. Will order rolling walker. OT to show daughter options for shower chair and showed option of toilet safety frame that can be purchased at [employer].     Discharge plan is home with  and assistance from daughters. D/C date set for Wednesday, 9/28. Will assist with plans.

## 2022-09-26 NOTE — PROGRESS NOTES
Inpatient Rehabilitation Plan of Care Note    Plan of Care  Updated Problems/Interventions  Cognition    [ST] Executive Functions(Active)  Current Status(09/26/2022): Mild cognitive impairment characterized by  difficulty with reasoning/logic and executive functioning skills  (planning,deficit awareness).  Weekly Goal(10/03/2022): Pt will participate in structured therapy activities  given intermittent assist.  Discharge Goal: Functional executive functioning skills for home        Swallow Function    [ST] Swallowing(Resolved)  Current Status(09/26/2022): Regular /thin liquids  Weekly Goal(10/03/2022): Patient will tolerate least restrictive diet with no  overt s/sx of aspiration or penetration.  Discharge Goal: Patient will tolerate least restrictive diet with no overt s/sx  of aspiration or penetration.    Signed by: Marlen Ochoa, SLP

## 2022-09-26 NOTE — PROGRESS NOTES
LOS: 17 days   Patient Care Team:  Wale Jacob MD as PCP - General (Family Medicine)      JASBIR MAHMOOD  1950         ADMITTING DIAGNOSIS:  Immobility syndrome    Subjective     She is comfortable with her breathing.  Does note that she had a fair amount of urine output with the low-dose hydrochlorothiazide.  No orthostatic symptoms.  Family conference today.  Present ostomy device is working better.      Objective     Vitals:    09/26/22 0523   BP: 119/57   Pulse: 67   Resp: 18   Temp: 97.1 °F (36.2 °C)   SpO2: 93%       PHYSICAL EXAM:     Awake, alert and 0x3  NAD  Heart: Regular rate and rhythm.  No rub murmur or gallop.  Lungs: Clear to auscultation  Abdomen: Colostomy  Normoactive bowel sounds.  Soft and nontender    Ext:  Good strength bilaterally  Trace edema bilateral lower extremities    MEDICATIONS  Scheduled Meds:amLODIPine, 5 mg, Oral, Q24H  atorvastatin, 40 mg, Oral, Nightly  cholecalciferol, 2,000 Units, Oral, Daily  enoxaparin, 40 mg, Subcutaneous, Nightly  lisinopril, 5 mg, Oral, Q24H  pantoprazole, 40 mg, Oral, Q AM  potassium chloride, 40 mEq, Oral, Once  sertraline, 25 mg, Oral, Daily      Continuous Infusions:   PRN Meds:.•  acetaminophen  •  albuterol  •  HYDROcodone-acetaminophen **OR** [DISCONTINUED] HYDROcodone-acetaminophen      RESULTS  No results found for: POCGLU  Results from last 7 days   Lab Units 09/26/22  0806 09/22/22  0950 09/19/22  1100   WBC 10*3/mm3 6.04 6.14 7.22   HEMOGLOBIN g/dL 9.3* 9.5* 9.2*   HEMATOCRIT % 27.7* 28.1* 28.8*   PLATELETS 10*3/mm3 278 215 221     Results from last 7 days   Lab Units 09/26/22  0806 09/22/22  0950 09/19/22  1100   SODIUM mmol/L 138 140 141   POTASSIUM mmol/L 3.2* 3.8 4.5   CHLORIDE mmol/L 103 106 105   CO2 mmol/L 25.8 24.0 25.3   BUN mg/dL 11 10 10   CREATININE mg/dL 0.50* 0.60 0.49*   CALCIUM mg/dL 9.2 9.0 9.0   GLUCOSE mg/dL 118* 99 103*       Echocardiogram-September 1, 2022  Summary:                 The ejection fraction  biplane was calculated at 65%.                            Intravenous contrast was used to enhance endocardial border definition.                            The left ventricular chamber size, wall thickness, and systolic function are within normal limits. There are                            no regional wall motion abnormalities observed.                            Abnormal left ventricular diastolic filling consistent with impaired relaxation.                            The peak instantaneous gradient of the aortic valve is 28.7 mmHg. The mean gradient of the aortic valve                            is 16 mmHg.                            Mild aortic leaflet calcification.                            Mild aortic stenosis.                            Trace aortic regurgitation is noted.       ASSESSMENT and PLAN    Aneurysm (HCC)    Immobility Syndrome  1.  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 Will teach family colostomy care. Pain controlled. Encouraged to at least take pain meds prior to therapy    3.  HTN- stable on norvasc, lisinopril. Running low.  Decreased lisinopril to 5 mg.   September 16-blood pressure 128-131/60-63      4.  Depression - low dose zoloft    5.  DVT proph- was on lovenox-Will continue 40 mg daily for now    6. Pulm - nasal cannula O2. Wean, keep sats > 90%  September 13-Taper down to 2 L  September 16-sats 92% on room air    7. Right lateral elbow laceration - August 31 - sutured  September 16-as incisions located over a mobile joint, will continue sutures until Monday, September 19.  September 19-laceration not inspected today- will plan to assess on rounds tomorrow for suture removal  September 21-irregular edge to the traumatic laceration but it appears healed.  21 days sutured.  We will remove every other suture today and then the remainder in 2 days if integrity intact.  May have 1 additional suture under  a scab.  -will try to get the note on placement of suture  September 22-remove remaining sutures    8.  Orthostatic hypotension-she has bilateral lower extremity edema but will hold on adding diuretic given her orthostatic hypotension.  We will try thigh-high Jobst stockings 15-20 mm compression for both orthostasis as well as edema.  Echocardiogram recently showed ejection fraction 65%.     September 22-edema improved-continue compression stocking    9 Edema - Jobst - better  Sept 25 - right basilar crackles today. Hydrochlorothiazide 12.5 mg x one dose today.  September 2697-[id number].  Reweigh today.    TEAM CONF - SEPT 13 - TRANSFERS MOD . GAIT 8 FEET PARALLEL BARS MOD ASSIST. TOILET TRANSFERS MIN MOD. BATH MOD. LBD DEP. UBD MIN. EATING MIN. GROOMING MIN. TOILETING DEP. FATIGUES. USING PUREWICK.   OSTOMY EDUCATION. MEPILEX TO RIGHT ELBOW LACERATION. COGNITION - MILD TO MODERATE COGNITIVE IMPAIRMENT. VISUAL SPATIAL DEFICITS. OSTOMY EDUCATION. ABDOMINAL INCISION HEALING WITH JUST ONE SMALL AREA INFERIORLY WITH SLIGHT DRAINAGE. WILL DISCONTINUE PUREWICK. TO DO TIMED VOIDS.  PULMONARY - WEAN O2 .   ELOS - THREE WEEKS    September 16-Transfers contact-guard min assist.  Ambulated up to 45 feet contact-guard rolling walker.      TEAM CONF - SEPT 20 - TRANSFERS CTG MIN. CAR TRANSFERS MOD ASSIST.   GAIT 80 FEET RW CTG.   BATH MIN. LBD MIN UBD SBA. EATING SBA. TOILETING MIN ASSIST.   COGNITION - CUES TO GO STEP BY STEP AND NEEDS TO RE-READ DIRECTIONS. DEFICITS WITH WORKING MEMORY.  BNE (Active)  Att'n. - WNL  Exec. Fx. - Mildly Imp.  Rsng/Jabrahamnt - WNL  Arith - Mod. Imp.  Visuospatial Skills - Mildly Imp.  Visual Mem. - MIldly Imp.  Verbal Mem. - Mod. Imp., improved with cues  Emot - Pt reported improved mood  OCCASIONAL TEARFUL WITH PASTORAL COUNSELING.  EDUCATION ON COLOSTOMY.  EDEMA BETTER WITH COMPRESSION STOCKINGS.  ELOS - ONE WEEK.    Now admit for comprehensive acute inpatient rehabilitation .  This would be  an interdisciplinary program with physical therapy 1.5 hour,  occupational therapy 1.5 hour,  5 days a week.  Rehabilitation nursing for carryover, monitoring of  GI   status, bowel and bladder, and skin  Ongoing physician follow-up.  Weekly team conferences.  Goals are to achieve a level of supervision with  mobility and self-care and improved activity tolerance.   Rehabilitation prognosis air.  Medical prognosis fair.  Estimated length of stay is approximately 10 days , but is only an estimation.     The patient's functional status and clinical status is unchanged from preadmission assessment and the patient continues appropriate for acute inpatient rehabilitation.  Goal is for home with  Home health   therapies.  Barrier to discharge:  Impaired mobility and self care   - work on  Transfers, balance, gait, ADLS  to overcome.             Hossein Rivers MD      During rounds, used appropriate personal protective equipment including mask and gloves.  Additional gown if indicated.  Mask used was standard procedure mask. Appropriate PPE was worn during the entire visit.  Hand hygiene was completed before and after.

## 2022-09-26 NOTE — PLAN OF CARE
Goal Outcome Evaluation:  Plan of Care Reviewed With: patient        Progress: improving  Outcome Evaluation: A&Ox4. Assist x1.  checked off to assist patient to the bathroom. Colostomy in place, ostomy nurse changed today. Abdominal incision approximated and scabbed, SPRING.  Headache treated with PRN medication. No unsafe behaviors. Participated with therapies. MD added a dose of potassium chloride this afternoon due to lab value. Jobst stockings during day and ace wraps at night to help with edema. Family conference today, expected discharge 9/28. Wound ostomy did some education with family while changing ostomy bag this afternoon.

## 2022-09-26 NOTE — DISCHARGE PLACEMENT REQUEST
"Summer Smith (71 y.o. Female)             Date of Birth   1950    Social Security Number       Address   [address] [place] [address]    Home Phone   [phone]    MRN   [id number]       [employer]   [employer]    Marital Status                               Admission Date   9/9/22    Admission Type   Elective    Admitting Provider   Hossein Rivers MD    Attending Provider   Hossein Rivers MD    Department, Room/Bed   [facility], 4403/1       Discharge Date       Discharge Disposition       Discharge Destination                               Attending Provider: Hossein Rivers MD    Allergies: No Known Allergies    Isolation: None   Infection: None   Code Status: CPR   Advance Care Planning Activity    Ht: 177.8 cm (70\")   Wt: 115 kg (252 lb 10.4 oz)    Admission Cmt: None   Principal Problem: None                Active Insurance as of 9/9/2022     Primary Coverage     Payor Plan Insurance Group Employer/Plan Group    MEDICARE MEDICARE A & B      Payor Plan Address Payor Plan Phone Number Payor Plan Fax Number Effective Dates    [address] [phone]  11/1/2016 - None Entered    [facility] [id number]       Subscriber Name Subscriber Birth Date Member ID       SUMMER SMITH 1950 [id number]           Secondary Coverage     Payor Plan Insurance Group Employer/Plan Group    AARP  SUP AARP HEALTH CARE OPTIONS      Payor Plan Address Payor Plan Phone Number Payor Plan Fax Number Effective Dates    [facility] [phone]  1/1/2022 - None Entered    [address]       [facility] [id number]       Subscriber Name Subscriber Birth Date Member ID       SUMMER SMITH 1950 [id number]                 Emergency Contacts      (Rel.) Home Phone Work Phone Mobile Phone    ELIDA GONZALEZ (Daughter) [phone] -- --    ELAMACARIO MARIEE (Spouse) [phone] -- --              "

## 2022-09-26 NOTE — THERAPY TREATMENT NOTE
Inpatient Rehabilitation - Physical Therapy Treatment Note       [facility]     Patient Name: Summer Smith  : 1950  MRN: [id number]    Today's Date: 2022                    Admit Date: 2022      Visit Dx:     ICD-10-CM ICD-9-CM   1. Follow-up exam  Z09 V67.9       Patient Active Problem List   Diagnosis   • Aneurysm (HCC)       Past Medical History:   Diagnosis Date   • COPD (chronic obstructive pulmonary disease) (HCC)    • Elevated cholesterol    • GERD (gastroesophageal reflux disease)        Past Surgical History:   Procedure Laterality Date   • CARDIAC CATHETERIZATION     • COLON SURGERY     • COLONOSCOPY     • SKIN BIOPSY         PT ASSESSMENT (last 12 hours)     IRF PT Evaluation and Treatment     Row Name 22 1017          PT Time and Intention    Document Type daily treatment  -MD     Mode of Treatment physical therapy  -MD     Patient/Family/Caregiver Comments/Observations Pt sitting in recliner showing no signs of acute distress.  -MD     Row Name 22 1017          Pain Assessment    Pretreatment Pain Rating 0/10 - no pain  -MD     Row Name 22 1017          Cognition/Psychosocial    Orientation Status (Cognition) oriented x 3  -MD     Follows Commands (Cognition) follows one-step commands;over 90% accuracy  -MD     Personal Safety Interventions fall prevention program maintained;gait belt  -MD     Row Name 22 1017          Mobility    Advanced Gait Activity curb negotiation  -MD     Additional Documentation Advanced Gait Activity (Row)  -MD     Row Name 22 1017          Bed Mobility    Supine-Sit [place] (Bed Mobility) standby assist;verbal cues  -MD     Sit-Supine [place] (Bed Mobility) standby assist;verbal cues  -MD     Row Name 22 1017          Transfers    Sit-Stand [place] (Transfers) standby assist  -MD     Stand-Sit [place] (Transfers) standby assist  -MD     [place] Level (Toilet Transfer) standby assist  -MD      Assistive Device (Toilet Transfer) walker, front-wheeled;grab bars/safety frame  -MD     Row Name 09/26/22 1017          Sit-Stand Transfer    Assistive Device (Sit-Stand Transfers) walker, front-wheeled  -MD     Row Name 09/26/22 1017          Stand-Sit Transfer    Assistive Device (Stand-Sit Transfers) walker, front-wheeled  -MD     Row Name 09/26/22 1017          Toilet Transfer    Type (Toilet Transfer) sit-stand;stand-sit  -MD     Row Name 09/26/22 1017          Car Transfer    [place] Level (Car Transfer) standby assist  -MD     Assistive Device (Car Transfer) walker, front-wheeled  -MD     Row Name 09/26/22 1017          Gait/Stairs (Locomotion)    [place] Level (Gait) standby assist  -MD     Assistive Device (Gait) walker, front-wheeled  -MD     Distance in Feet (Gait) 80'x3 and 160'x1  -MD     Pattern (Gait) step-through  -MD     Deviations/Abnormal Patterns (Gait) gait speed decreased;hunter decreased  -MD     Bilateral Gait Deviations forward flexed posture  -MD     Row Name 09/26/22 1017          Curb Negotiation (Mobility)    [place], Curb Negotiation contact guard  -MD     Assistive Device (Curb Negotiation) walker, front-wheeled  -MD     Row Name 09/26/22 1017          Positioning and Restraints    Pre-Treatment Position sitting in chair/recliner  -MD     Post Treatment Position wheelchair  -MD     In Wheelchair sitting;call light within reach;exit alarm on;with family/caregiver  -MD           User Key  (r) = Recorded By, (t) = Taken By, (c) = Cosigned By    Initials Name Provider Type    Ale Regalado, PT Physical Therapist              Wound 09/09/22 2200 Right upper arm Laceration (Active)   Dressing Appearance open to air 09/25/22 2000   Closure Sutures 09/25/22 2000   Base dry;scab 09/25/22 2000       Wound 09/09/22 2200 Left midline abdomen Incision (Active)   Dressing Appearance open to air 09/25/22 2000   Closure Approximated 09/25/22 2000   Base clean;dry 09/25/22 2000      Physical Therapy Education                 Title: PT OT SLP Therapies (Done)     Topic: Physical Therapy (Done)     Point: Mobility training (Done)     Learning Progress Summary           Patient Acceptance, E, VU by WN at 9/25/2022 2340   Family Acceptance, E, VU by WN at 9/25/2022 2340   Significant Other Acceptance, E, VU by WN at 9/23/2022 2340      Show all documentation for this point (20)                 Point: Home exercise program (Done)     Learning Progress Summary           Patient Acceptance, E, VU by WN at 9/25/2022 2340   Family Acceptance, E, VU by WN at 9/25/2022 2340   Significant Other Acceptance, E, VU by WN at 9/23/2022 2340      Show all documentation for this point (20)                 Point: Body mechanics (Done)     Learning Progress Summary           Patient Acceptance, E, VU by WN at 9/25/2022 2340   Family Acceptance, E, VU by WN at 9/25/2022 2340   Significant Other Acceptance, E, VU by WN at 9/23/2022 2340      Show all documentation for this point (20)                 Point: Precautions (Done)     Learning Progress Summary           Patient Acceptance, E, VU by MD at 9/26/2022 1145   Family Acceptance, E, VU by WN at 9/25/2022 2340   Significant Other Acceptance, E, VU by WN at 9/23/2022 2340      Show all documentation for this point (22)                             User Key     Initials Effective Dates Name Provider Type Discipline    MD 06/16/21 -  Ale Garza, PT Physical Therapist PT    WN 08/23/22 -  Sancho Eugene, RN Registered Nurse Nurse                PT Recommendation and Plan                          Time Calculation:      PT Charges     Row Name 09/26/22 1419 09/26/22 1016          Time Calculation    Start Time 1330  -MD 1000  -MD     Stop Time 1400  -MD 1030  -MD     Time Calculation (min) 30 min  -MD 30 min  -MD     PT Received On -- 09/26/22  -MD     PT - Next Appointment -- 09/27/22  -MD           User Key  (r) = Recorded By, (t) = Taken By, (c) = Cosigned By     Initials Name Provider Type    Ale Regalado PT Physical Therapist                Therapy Charges for Today     Code Description Service Date Service Provider Modifiers Qty    65535668127  PT THER PROC EA 15 MIN 9/26/2022 Ale Garza PT GP 2    [id number]  GAIT TRAINING EA 15 MIN 9/26/2022 Ale Garza PT GP 2          Patient was wearing a face mask during this therapy encounter. Therapist used appropriate personal protective equipment including mask and gloves.  Mask used was standard procedure mask. Appropriate PPE was worn during the entire therapy session. Hand hygiene was completed before and after therapy session. Patient is not in enhanced droplet precautions.              Ale Garza PT  9/26/2022

## 2022-09-26 NOTE — THERAPY TREATMENT NOTE
Inpatient Rehabilitation - Speech Language Pathology Treatment Note    [facility]     Patient Name: Summer Smith  : 1950  MRN: [id number]    Today's Date: 2022                   Admit Date: 2022       Visit Dx:      ICD-10-CM ICD-9-CM   1. Follow-up exam  Z09 V67.9       Patient Active Problem List   Diagnosis   • Aneurysm (HCC)       Past Medical History:   Diagnosis Date   • COPD (chronic obstructive pulmonary disease) (HCC)    • Elevated cholesterol    • GERD (gastroesophageal reflux disease)        Past Surgical History:   Procedure Laterality Date   • CARDIAC CATHETERIZATION     • COLON SURGERY     • COLONOSCOPY     • SKIN BIOPSY         SLP Recommendation and Plan                                                   SLP EVALUATION (last 72 hours)     SLP SLC Evaluation     Row Name 22 1100 22 1300             Communication Assessment/Intervention    Document Type therapy note (daily note)  -SR therapy note (daily note)  -SR therapy note (daily note)  -SR      Subjective Information no complaints  -SR no complaints  -SR no complaints  -SR      Patient Observations alert;cooperative;agree to therapy  -SR alert;cooperative;agree to therapy  -SR alert;cooperative;agree to therapy  -SR      Patient Effort good  -SR good  -SR good  -SR      Symptoms Noted During/After Treatment none  -SR none  -SR none  -SR              Pain Scale: Numbers Pre/Post-Treatment    Pretreatment Pain Rating 0/10 - no pain  -SR 0/10 - no pain  -SR 0/10 - no pain  -SR      Posttreatment Pain Rating 0/10 - no pain  -SR 0/10 - no pain  -SR 0/10 - no pain  -SR            User Key  (r) = Recorded By, (t) = Taken By, (c) = Cosigned By    Initials Name Effective Dates    SR Marlen Ochoa SLP 22 -                    EDUCATION    The patient has been educated in the following areas:       Cognitive Impairment.             SLP GOALS     Row Name 22 1100 22 0922 1300     "   Attention Goal 1 (SLP)    Improve Attention by Goal 1 (SLP) -- attending to task;complete selective attention task;80%;independently (over 90% accuracy)  -SR attending to task;complete selective attention task;80%;independently (over 90% accuracy)  -SR    Time Frame (Attention Goal 1, SLP) -- by discharge  -SR by discharge  -SR    Progress/Outcomes (Attention Goal 1, SLP) -- goal partially met  -SR goal partially met  -SR    Comment (Attention Goal 1, SLP) -- Patient followed written directions with 80% acc given no cues; 100% acc given min cues.  Cues were provided for attn to detail.  -SR Patient followed written \"if-then\" conditional directions with 90% acc given no cues  -SR       Organizational Skills Goal 1 (SLP)    Improve Thought Organization Through Goal 1 (SLP) completing mental manipulation task;80%;independently (over 90% accuracy)  -SR -- --    Time Frame (Thought Organization Skills Goal 1, SLP) by discharge  -SR -- --    Progress/Outcomes (Thought Organization Skills Goal 1, SLP) goal partially met  -SR -- --    Comment (Thought Organization Skills Goal 1, SLP) Word circles; Patient unscrambled 8-12 letter word with 80% accuracy given no cues.  -SR -- --       Functional Problem Solving Skills Goal 1 (SLP)    Improve Problem Solving Through Goal 1 (SLP) -- determine solutions to simple ADL/safety problems;determine solutions to multifactorial problems;sequence steps in a task;complete organization/home management task;70%;independently (over 90% accuracy)  -SR determine solutions to simple ADL/safety problems;determine solutions to multifactorial problems;sequence steps in a task;complete organization/home management task;70%;independently (over 90% accuracy)  -SR    Time Frame (Problem Solving Goal 1, SLP) -- by discharge  -SR by discharge  -SR    Progress/Outcomes (Problem Solving Goal 1, SLP) -- goal partially met  -SR goal partially met  -SR    Comment (Problem Solving Goal 1, SLP) -- Patient " followed directions and organized information on a chart (3x4 grid) during deductive reasoning/logic activity with 75% acc given no cues.  -SR Patient used baking visual to answer questions about baking times and measurements with 70% accuracy given no cues;100% acc given min cues  -SR       Functional Math Skills Goal 1 (SLP)    Improve Functional Math Skills Through Goal 1 (SLP) complete word problems involving time;complete functional math task;70%;independently (over 90% accuracy)  -SR -- --    Time Frame (Functional Math Skills Goal 1, SLP) by discharge  -SR -- --    Progress/Outcomes (Functional Math Skills Goal 1, SLP) goal partially met  -SR -- --    Comment (Functional Math Skills Goal 1, SLP) Patient completed checkbook balance activity with 80% accuracy given no cues. No calculator assist.  -SR -- --       Executive Functional Skills Goal 1 (SLP)    Improve Executive Function Skills Goal 1 (SLP) -- demonstrate awareness of deficit;identify strategies, strengths, limitations;time management activity;complex organization/planning activity;home management activity;70%;independently (over 90% accuracy)  -SR demonstrate awareness of deficit;identify strategies, strengths, limitations;time management activity;complex organization/planning activity;home management activity;70%;independently (over 90% accuracy)  -SR    Time Frame (Executive Function Skills Goal 1, SLP) -- by discharge  -SR by discharge  -SR    Progress (Executive Function Skills Goal 1, SLP) -- -- 80%;independently (over 90% accuracy)  -SR    Progress/Outcomes (Executive Function Skills Goal 1, SLP) -- goal partially met  -SR goal partially met  -SR    Comment (Executive Function Skills Goal 1, SLP) -- Patient answered questions involving time with 80% acc given no cues.  -SR --          User Key  (r) = Recorded By, (t) = Taken By, (c) = Cosigned By    Initials Name Provider Type    SR Marlen Ochoa, SLP Speech and Language Pathologist                             Time Calculation:        Time Calculation- SLP     Row Name 09/26/22 1124 09/26/22 1008          Time Calculation- SLP    SLP Start Time 1030  -SR 0900  -SR     SLP Stop Time 1100  -SR 0930  -SR     SLP Time Calculation (min) 30 min  -SR 30 min  -SR           User Key  (r) = Recorded By, (t) = Taken By, (c) = Cosigned By    Initials Name Provider Type    SR Marlen Ochoa SLP Speech and Language Pathologist                  Therapy Charges for Today     Code Description Service Date Service Provider Modifiers Qty    75384500023 HC ST DEV OF COGN SKILLS INITIAL 15 MIN 9/26/2022 Marlen Ochoa SLP  1    88194202471 HC ST DEV OF COGN SKILLS EACH ADDT'L 15 MIN 9/26/2022 Marlen Ochoa SLP  3                           RAGHU Zavala  9/26/2022     caffeine

## 2022-09-26 NOTE — NURSING NOTE
09/26/22 1420   Colostomy LLQ   Placement Date/Time: 09/09/22 2100   Placed by External Staff?: Other hospital  Location: LLQ   Stomal Appliance Changed;1 piece  (Lucio soft convex)   Stoma Appearance round;flush with skin;red;moist  (in abdominal fold)   Peristomal Assessment Clean;Intact   Accessories/Skin Care cleansed with water;skin barrier ring;skin barrier paste;convex wafer;appliance belt  (beads of paste placed to 3 and 9 oclock)   Stoma Function stool   Stool Color brown   Stool Consistency soft;formed   Treatment Bag change;Site care     Wound/ostomy - the Lucio soft convex with implementation of a belt that was placed on Friday has worked well, ostomy appliance intact with no leaking, will continue this. Spouse and 2 daughters are present to observe ostomy pouch change. Paste implemented today at 3 and 9 oclock to enhance seal as ostomies will often leak at this crease. Stool is thickening. Discussed pouching options with family and patient. Belt will likely always be necessary so convexity may be necessary due to option of belt tabs.The lucio soft convex is working well but patient produces a lot of gas so option to burp with 2 piece at coupling is helpful and patient likes the idea of eventual use of disposable pouches. Will request lucio and coloplast samples 1 and 2 piece with convexity. Patient will have home health upon d/c and one of the daughters has reached out to a hospital in patient's town and there is an ostomy nurse who patient can see as an outpatient if it is necessary. Discussed with them that we are an outpatient option as well. Will need to take more bags to patient to have for d/c. Plan is for d/c home this Wednesday.

## 2022-09-26 NOTE — PLAN OF CARE
Goal Outcome Evaluation:  Plan of Care Reviewed With: patient, spouse           Outcome Evaluation: pt's bilateral lower extremities' swelling decreased, ace wraps in place, pt and spouse educated on colostomy care, home self care and falls prevention. education on medication safety completed, all concerns addressed, denies any concerns at the time, all safety precautions in place, spouse at the bedside, will continue to monitor.

## 2022-09-27 PROCEDURE — 97130 THER IVNTJ EA ADDL 15 MIN: CPT

## 2022-09-27 PROCEDURE — 97116 GAIT TRAINING THERAPY: CPT

## 2022-09-27 PROCEDURE — 97110 THERAPEUTIC EXERCISES: CPT

## 2022-09-27 PROCEDURE — 97530 THERAPEUTIC ACTIVITIES: CPT

## 2022-09-27 PROCEDURE — 97129 THER IVNTJ 1ST 15 MIN: CPT

## 2022-09-27 PROCEDURE — 25010000002 ENOXAPARIN PER 10 MG: Performed by: PHYSICAL MEDICINE & REHABILITATION

## 2022-09-27 PROCEDURE — 97535 SELF CARE MNGMENT TRAINING: CPT

## 2022-09-27 RX ORDER — AMLODIPINE BESYLATE 5 MG/1
5 TABLET ORAL
Qty: 30 TABLET | Refills: 1 | Status: SHIPPED | OUTPATIENT
Start: 2022-09-27

## 2022-09-27 RX ORDER — ACETAMINOPHEN 325 MG/1
650 TABLET ORAL EVERY 6 HOURS PRN
Start: 2022-09-27

## 2022-09-27 RX ORDER — ATORVASTATIN CALCIUM 40 MG/1
40 TABLET, FILM COATED ORAL NIGHTLY
Qty: 30 TABLET | Refills: 1 | Status: SHIPPED | OUTPATIENT
Start: 2022-09-27

## 2022-09-27 RX ORDER — LISINOPRIL 5 MG/1
5 TABLET ORAL
Qty: 30 TABLET | Refills: 1 | Status: SHIPPED | OUTPATIENT
Start: 2022-09-27

## 2022-09-27 RX ADMIN — SERTRALINE 25 MG: 25 TABLET, FILM COATED ORAL at 07:31

## 2022-09-27 RX ADMIN — LISINOPRIL 5 MG: 5 TABLET ORAL at 07:31

## 2022-09-27 RX ADMIN — PANTOPRAZOLE SODIUM 40 MG: 40 TABLET, DELAYED RELEASE ORAL at 05:46

## 2022-09-27 RX ADMIN — ENOXAPARIN SODIUM 40 MG: 100 INJECTION SUBCUTANEOUS at 20:14

## 2022-09-27 RX ADMIN — ATORVASTATIN CALCIUM 40 MG: 20 TABLET, FILM COATED ORAL at 20:14

## 2022-09-27 RX ADMIN — Medication 2000 UNITS: at 07:32

## 2022-09-27 RX ADMIN — AMLODIPINE BESYLATE 5 MG: 5 TABLET ORAL at 07:31

## 2022-09-27 NOTE — THERAPY DISCHARGE NOTE
Inpatient Rehabilitation - Physical Therapy Treatment Note/Discharge  [facility]     Patient Name: Summer Smith  : 1950  MRN: [id number]  Today's Date: 2022                Admit Date: 2022    Visit Dx:    ICD-10-CM ICD-9-CM   1. Follow-up exam  Z09 V67.9     Patient Active Problem List   Diagnosis   • Aneurysm (HCC)     Past Medical History:   Diagnosis Date   • COPD (chronic obstructive pulmonary disease) (HCC)    • Elevated cholesterol    • GERD (gastroesophageal reflux disease)      Past Surgical History:   Procedure Laterality Date   • CARDIAC CATHETERIZATION     • COLON SURGERY     • COLONOSCOPY     • SKIN BIOPSY         PT ASSESSMENT (last 12 hours)     IRF PT Evaluation and Treatment     Row Name 22          PT Time and Intention    Document Type discharge evaluation  -MD     Mode of Treatment physical therapy  -MD     Patient/Family/Caregiver Comments/Observations Pt sitting in WC showing no signs of acute distress.  -MD     Row Name 22          Pain Assessment    Pretreatment Pain Rating 0/10 - no pain  -MD     Row Name 22          Cognition/Psychosocial    Orientation Status (Cognition) oriented x 3  -MD     Follows Commands (Cognition) follows one-step commands;over 90% accuracy  -MD     Personal Safety Interventions gait belt;fall prevention program maintained  -MD     Row Name 22          Mobility    Advanced Gait Activity rough/uneven surfaces  -MD     Row Name 22          Bed Mobility    Rolling Left [place] (Bed Mobility) standby assist  -MD     Rolling Right [place] (Bed Mobility) standby assist  -MD     Supine-Sit [place] (Bed Mobility) standby assist  -MD     Sit-Supine [place] (Bed Mobility) standby assist  -MD     Row Name 22          Transfers    Sit-Stand [place] (Transfers) standby assist  -MD     Stand-Sit [place] (Transfers) standby assist  -MD     Row Name 22           Sit-Stand Transfer    Assistive Device (Sit-Stand Transfers) walker, front-wheeled;wheelchair  -MD     Row Name 09/27/22 0936          Stand-Sit Transfer    Assistive Device (Stand-Sit Transfers) walker, front-wheeled  -MD     Row Name 09/27/22 0936          Gait/Stairs (Locomotion)    [place] Level (Gait) standby assist  -MD     Assistive Device (Gait) walker, front-wheeled  -MD     Distance in Feet (Gait) 160'  -MD     Pattern (Gait) step-through  -MD     Deviations/Abnormal Patterns (Gait) gait speed decreased;hunter decreased  -MD     Bilateral Gait Deviations forward flexed posture  -MD     [place] Level (Stairs) verbal cues;contact guard  -MD     Handrail Location (Stairs) both sides  -MD     Number of Steps (Stairs) 4  -MD     Ascending Technique (Stairs) step-to-step  -MD     Descending Technique (Stairs) step-to-step  -MD     Row Name 09/27/22 0936          Rough/Uneven Surface Gait Skills (Mobility)    [place], Gait on Rough/Uneven Surface (Mobility) verbal cues;contact guard  -MD     Assistive Device (Rough/Uneven Surface Gait) walker, front-wheeled  -MD     Distance in Feet (Rough/Uneven Surface Gait) 10'x2  -MD     Row Name 09/27/22 0936          Hip (Therapeutic Exercise)    Hip Strengthening (Therapeutic Exercise) bilateral;aBduction;aDduction;marching while seated;10 repetitions  -MD     Row Name 09/27/22 0936          Knee (Therapeutic Exercise)    Knee Strengthening (Therapeutic Exercise) bilateral;LAQ (long arc quad);hamstring curls;10 repetitions  -MD     Row Name 09/27/22 0936          Ankle (Therapeutic Exercise)    Ankle Strengthening (Therapeutic Exercise) bilateral;dorsiflexion;plantarflexion;10 repetitions  -MD     Row Name 09/27/22 0936          Positioning and Restraints    Pre-Treatment Position sitting in chair/recliner  -MD     Post Treatment Position chair  -MD     In Chair reclined;sitting;call light within reach;exit alarm on  -MD     Row Name 09/27/22 0936           Daily Progress Summary (PT)    Daily Progress Summary (PT) Family teaching completed 9/26.  Family stated comfort w level of assistance necessary for pt safety at home.  -MD     Row Name 09/27/22 0936          Bed Mobility Goal 1 (PT-IRF)    Progress/Outcomes (Bed Mobility Goal 1, PT-IRF) goal met  -MD     Row Name 09/27/22 0936          Bed Mobility Goal 2 (PT-IRF)    Progress/Outcomes (Bed Mobility Goal 2, PT-IRF) goal not met  -MD     Row Name 09/27/22 0936          Transfer Goal 3 (PT-IRF)    Progress/Outcomes (Transfer Goal 3, PT-IRF) goal met  -MD           User Key  (r) = Recorded By, (t) = Taken By, (c) = Cosigned By    Initials Name Provider Type    Ale Regalado PT Physical Therapist                Physical Therapy Education                 Title: PT OT SLP Therapies (Done)     Topic: Physical Therapy (Done)     Point: Mobility training (Done)     Learning Progress Summary           Patient Acceptance, E, VU by WN at 9/25/2022 2340   Family Acceptance, E, VU by WN at 9/25/2022 2340   Significant Other Acceptance, E, VU by WN at 9/23/2022 2340      Show all documentation for this point (20)                 Point: Home exercise program (Done)     Learning Progress Summary           Patient Acceptance, E,D,H, VU,DU by MD at 9/27/2022 1248   Family Acceptance, E, VU by WN at 9/25/2022 2340   Significant Other Acceptance, E, VU by WN at 9/23/2022 2340      Show all documentation for this point (21)                 Point: Body mechanics (Done)     Learning Progress Summary           Patient Acceptance, E, VU by WN at 9/25/2022 2340   Family Acceptance, E, VU by WN at 9/25/2022 2340   Significant Other Acceptance, E, VU by WN at 9/23/2022 2340      Show all documentation for this point (20)                 Point: Precautions (Done)     Learning Progress Summary           Patient Acceptance, E, VU by MD at 9/27/2022 0938   Family Acceptance, E, VU by WN at 9/25/2022 2340   Significant Other Acceptance,  E, VU by WN at 9/23/2022 2340      Show all documentation for this point (23)                             User Key     Initials Effective Dates Name Provider Type Discipline    MD 06/16/21 -  Ale Garza PT Physical Therapist PT    WN 08/23/22 -  Sancho Eugene, RN Registered Nurse Nurse                PT Recommendation and Plan        Daily Progress Summary (PT)  Daily Progress Summary (PT): Family teaching completed 9/26.  Family stated comfort w level of assistance necessary for pt safety at home.         Time Calculation:    PT Charges     Row Name 09/27/22 1247 09/27/22 0936          Time Calculation    Start Time 1230  -MD 0930  -MD     Stop Time 1300  -MD 1000  -MD     Time Calculation (min) 30 min  -MD 30 min  -MD     PT Received On -- 09/27/22  -MD     PT - Next Appointment -- 09/28/22  -MD           User Key  (r) = Recorded By, (t) = Taken By, (c) = Cosigned By    Initials Name Provider Type    Ale Regalado, PT Physical Therapist                Therapy Charges for Today     Code Description Service Date Service Provider Modifiers Qty    73628298496 HC PT THER PROC EA 15 MIN 9/26/2022 Ale Garza, PT GP 2    74648272157 HC GAIT TRAINING EA 15 MIN 9/26/2022 Ale Garza, PT GP 2    81469952744 HC PT THER PROC EA 15 MIN 9/27/2022 Ale Garza, PT GP 1    71841010194 HC GAIT TRAINING EA 15 MIN 9/27/2022 Ale Garza, PT GP 2    11288473425 HC PT THERAPEUTIC ACT EA 15 MIN 9/27/2022 Ale Garza, PT GP 1            Patient was wearing a face mask during this therapy encounter. Therapist used appropriate personal protective equipment including mask and gloves.  Mask used was standard procedure mask. Appropriate PPE was worn during the entire therapy session. Hand hygiene was completed before and after therapy session. Patient is not in enhanced droplet precautions.              Ale Garza PT  9/27/2022

## 2022-09-27 NOTE — PROGRESS NOTES
LOS: 18 days   Patient Care Team:  Wale Jacob MD as PCP - General (Family Medicine)      JASBIR MAHMOOD  1950         ADMITTING DIAGNOSIS:  Immobility syndrome    Subjective     She is comfortable with her breathing.   Edema in the legs about the same.  Tolerating activities.  No new abdominal complaints      Objective     Vitals:    09/27/22 0518   BP: 121/60   Pulse: 79   Resp: 18   Temp: 97.3 °F (36.3 °C)   SpO2: 91%       PHYSICAL EXAM:     Awake, alert and 0x3  NAD  Heart: Regular rate and rhythm.  No rub murmur or gallop.  Lungs: Clear to auscultation  Abdomen: Colostomy  Normoactive bowel sounds.  Soft and nontender    Ext:  Good strength bilaterally  Trace edema bilateral lower extremities    MEDICATIONS  Scheduled Meds:amLODIPine, 5 mg, Oral, Q24H  atorvastatin, 40 mg, Oral, Nightly  cholecalciferol, 2,000 Units, Oral, Daily  enoxaparin, 40 mg, Subcutaneous, Nightly  lisinopril, 5 mg, Oral, Q24H  pantoprazole, 40 mg, Oral, Q AM  sertraline, 25 mg, Oral, Daily      Continuous Infusions:   PRN Meds:.•  acetaminophen      RESULTS  No results found for: POCGLU  Results from last 7 days   Lab Units 09/26/22  0806 09/22/22  0950   WBC 10*3/mm3 6.04 6.14   HEMOGLOBIN g/dL 9.3* 9.5*   HEMATOCRIT % 27.7* 28.1*   PLATELETS 10*3/mm3 278 215     Results from last 7 days   Lab Units 09/26/22  0806 09/22/22  0950   SODIUM mmol/L 138 140   POTASSIUM mmol/L 3.2* 3.8   CHLORIDE mmol/L 103 106   CO2 mmol/L 25.8 24.0   BUN mg/dL 11 10   CREATININE mg/dL 0.50* 0.60   CALCIUM mg/dL 9.2 9.0   GLUCOSE mg/dL 118* 99       Echocardiogram-September 1, 2022  Summary:                 The ejection fraction biplane was calculated at 65%.                            Intravenous contrast was used to enhance endocardial border definition.                            The left ventricular chamber size, wall thickness, and systolic function are within normal limits. There are                            no regional wall  motion abnormalities observed.                            Abnormal left ventricular diastolic filling consistent with impaired relaxation.                            The peak instantaneous gradient of the aortic valve is 28.7 mmHg. The mean gradient of the aortic valve                            is 16 mmHg.                            Mild aortic leaflet calcification.                            Mild aortic stenosis.                            Trace aortic regurgitation is noted.       ASSESSMENT and PLAN    Aneurysm (HCC)    Immobility Syndrome  1.  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 Will teach family colostomy care. Pain controlled. Encouraged to at least take pain meds prior to therapy    3.  HTN- stable on norvasc, lisinopril. Running low.  Decreased lisinopril to 5 mg.   September 16-blood pressure 128-131/60-63      4.  Depression - low dose zoloft    5.  DVT proph- was on lovenox-Will continue 40 mg daily for now    6. Pulm - nasal cannula O2. Wean, keep sats > 90%  September 13-Taper down to 2 L  September 16-sats 92% on room air    7. Right lateral elbow laceration - August 31 - sutured  September 16-as incisions located over a mobile joint, will continue sutures until Monday, September 19.  September 19-laceration not inspected today- will plan to assess on rounds tomorrow for suture removal  September 21-irregular edge to the traumatic laceration but it appears healed.  21 days sutured.  We will remove every other suture today and then the remainder in 2 days if integrity intact.  May have 1 additional suture under a scab.  -will try to get the note on placement of suture  September 22-remove remaining sutures    8.  Orthostatic hypotension-she has bilateral lower extremity edema but will hold on adding diuretic given her orthostatic hypotension.  We will try thigh-high Jobst stockings 15-20 mm compression for both  orthostasis as well as edema.  Echocardiogram recently showed ejection fraction 65%.     September 22-edema improved-continue compression stocking    9 Edema - Jobst - better  Sept 25 - right basilar crackles today. Hydrochlorothiazide 12.5 mg x one dose today.  September 2622-[id number].  Reweigh today.    TEAM [facility] - SEPT 13 - TRANSFERS MOD . GAIT 8 FEET PARALLEL BARS MOD ASSIST. TOILET TRANSFERS MIN MOD. BATH MOD. LBD DEP. UBD MIN. EATING MIN. GROOMING MIN. TOILETING DEP. FATIGUES. USING PUREWICK.   OSTOMY EDUCATION. MEPILEX TO RIGHT ELBOW LACERATION. COGNITION - MILD TO MODERATE COGNITIVE IMPAIRMENT. VISUAL SPATIAL DEFICITS. OSTOMY EDUCATION. ABDOMINAL INCISION HEALING WITH JUST ONE SMALL AREA INFERIORLY WITH SLIGHT DRAINAGE. WILL DISCONTINUE PUREWICK. TO DO TIMED VOIDS.  PULMONARY - WEAN O2 .   ELOS - THREE WEEKS    September 16-Transfers contact-guard min assist.  Ambulated up to 45 feet contact-guard rolling walker.      TEAM [facility] - SEPT 20 - TRANSFERS CTG MIN. CAR TRANSFERS MOD ASSIST.   GAIT 80 FEET RW CTG.   BATH MIN. LBD MIN UBD SBA. EATING SBA. TOILETING MIN ASSIST.   COGNITION - CUES TO GO STEP BY STEP AND NEEDS TO RE-READ DIRECTIONS. DEFICITS WITH WORKING MEMORY.  BNE (Active)  Att'n. - WNL  Exec. Fx. - Mildly Imp.  Rsng/Jgmnt - WNL  Arith - Mod. Imp.  Visuospatial Skills - Mildly Imp.  Visual Mem. - MIldly Imp.  Verbal Mem. - Mod. Imp., improved with cues  Emot - Pt reported improved mood  OCCASIONAL TEARFUL WITH PASTORAL COUNSELING.  EDUCATION ON COLOSTOMY.  EDEMA BETTER WITH COMPRESSION STOCKINGS.  ELOS - ONE WEEK.    TEAM [facility] - SEPT 27 -  Mild cognitive impairment characterized by  difficulty with reasoning/logic and executive functioning skills. TOILET TRANSFER SBA. SHOWER TRANSFER CTG. TRANSFERS SBA. GAIT 160 FEET SBA. BATH SBA. LBD SBA WITH SET UP OF JOBST. UBD SBA. GROOMING SBA. TOILETING MIN. COLOSTOMY.   [facility] - SEPT 28    Now admit for comprehensive acute inpatient rehabilitation .   This would be an interdisciplinary program with physical therapy 1.5 hour,  occupational therapy 1.5 hour,  5 days a week.  Rehabilitation nursing for carryover, monitoring of  GI   status, bowel and bladder, and skin  Ongoing physician follow-up.  Weekly team conferences.  Goals are to achieve a level of supervision with  mobility and self-care and improved activity tolerance.   Rehabilitation prognosis air.  Medical prognosis fair.  Estimated length of stay is approximately 10 days , but is only an estimation.     The patient's functional status and clinical status is unchanged from preadmission assessment and the patient continues appropriate for acute inpatient rehabilitation.  Goal is for home with  Home health   therapies.  Barrier to discharge:  Impaired mobility and self care   - work on  Transfers, balance, gait, ADLS  to overcome.             Hossein Rivers MD      During rounds, used appropriate personal protective equipment including mask and gloves.  Additional gown if indicated.  Mask used was standard procedure mask. Appropriate PPE was worn during the entire visit.  Hand hygiene was completed before and after.

## 2022-09-27 NOTE — PLAN OF CARE
Goal Outcome Evaluation:  Plan of Care Reviewed With: patient        Progress: improving  Outcome Evaluation: Pt rested well this shift.  No c/o pain.   in room, checked off to transfer.  Up to bathroom ass x1 w/walker.  All meds whole w/water.  Colostomy bag intact, no leaking.  Abdominal incision scabbed, intact.

## 2022-09-27 NOTE — PROGRESS NOTES
TEAM [facility] - SEPT 27 -  Mild cognitive impairment characterized by  difficulty with reasoning/logic and executive functioning skills. TOILET TRANSFER SBA. SHOWER TRANSFER CTG. TRANSFERS SBA. GAIT 160 FEET SBA. BATH SBA. LBD SBA WITH SET UP OF JOBST. UBD SBA. GROOMING SBA. TOILETING MIN. COLOSTOMY.   [facility] - SEPT 28

## 2022-09-27 NOTE — PROGRESS NOTES
Case Management  Inpatient Rehabilitation Team Conference    Conference Date/Time: 9/27/2022 7:30:04 AM    Team Conference Attendees:  Dr. Hossein Lenz, Hebert.PAU Orr, PT  Sina Walls, OT  Marlen Ochoa, SLP  Veronika Emmanuel, CTRS  Mara Chappell RD, LD  Elisha Chaparro, RN  Joana Mahan, RN    Demographics            Age: 71Y            Gender: Female    Admission Date: 9/9/2022 9:57:00 PM  Rehabilitation Diagnosis:  Immobility Syndrome  Past Medical History: +?  PMH-COPD- was not on 02  GERD  T/A surgery  Inner ear surgery  Tubal ligation  ?      Plan of Care  Anticipated Discharge Date/Estimated Length of Stay: ELOS: DC 9/28  Anticipated Discharge Destination: Community discharge with assistance  Discharge Plan : Patient lives with  in one story home with ramp  entrance. One step from porch into home.  Family conference held with patient,  and dgts on 9/26.  D/C plan is home with  and intermittent assist from daughters who live  close by.  [facility]  to provide home PT and NSG.  Medical Necessity Expected Level Rationale: Goals are to acheieve a level of SBA  with rwx with ambulation and ADL's.  Rehab prognosis fair.  Medical prognosis  fair.  Intensity and Duration: an average of 3 hours/5 days per week  Medical Supervision and 24 Hour Rehab Nursing: x  Physical Therapy: x  PT Intensity/Duration: 1 hr / day, 5 days / week, for approximately 4 weeks.  Occupational Therapy: x  OT Intensity/Duration: 1 hr / day, 5 days / week, for approximately 4 weeks.  Speech and Language Therapy: x  SLP Intensity/Duration: 1 hr / day, 5 days / week, for approximately 4 weeks.  Social Work: x  Therapeutic Recreation: x  Psychology: x  Registered Dietician: x  Updated (if changes indicated)    Anticipated Discharge Date/Estimated Length of Stay:   ELOS: DC 9/28    Based on the patient's medical and functional status, their prognosis and  expected level of functional improvement  is: Goals are to acheieve a level of  SBA with rwx with ambulation and ADL's.  Rehab prognosis fair.  Medical  prognosis fair.      Interdisciplinary Problem/Goals/Status    All Rehab Problems:  Cognition    [ST] Executive Functions(Active)  Current Status(09/26/2022): Mild cognitive impairment characterized by  difficulty with reasoning/logic and executive functioning skills  (planning,deficit awareness).  Weekly Goal(10/03/2022): Pt will participate in structured therapy activities  given intermittent assist.  Discharge Goal: Functional executive functioning skills for home        Mobility    [OT] Toilet Transfers(Active)  Current Status(09/26/2022): SBA  Weekly Goal(09/28/2022): SBA  Discharge Goal: SBA    [OT] Tub/Shower Transfers(Active)  Current Status(09/26/2022): CGA  Weekly Goal(09/28/2022): SBA  Discharge Goal: SBA    [PT] Bed/Chair/Wheelchair(Active)  Current Status(09/27/2022): SBA rwx  Weekly Goal(09/29/2022): SBA  Discharge Goal: SBA    [PT] Walk(Active)  Current Status(09/27/2022): 160' SBA, RWx  Weekly Goal(09/29/2022): SBA  Discharge Goal: to and from BR SBA, RWx        Pain    [RN] Pain Management(Active)  Current Status(09/27/2022): Pain currently at tolerable rate  Weekly Goal(10/04/2022): Pain will be managed at tolerable rate  Discharge Goal: Patient will be able to tolerate pain and manage        Psychosocial    [RN] Coping/Adjustment(Active)  Current Status(09/27/2022): Pt is adjusting to bowel resection/colostomy  Weekly Goal(10/04/2022): Patient adjusted to new bowel regimen  Discharge Goal: Patient coping well with new colostomy        Safety    [RN] Potential for Injury(Active)  Current Status(09/27/2022): Patient at risk for falls due to weakness from  abdominal surger  Weekly Goal(10/04/2022): Patient will use call light appropriately  Discharge Goal: Patient will be aware of risk of falls        Self Care    [OT] Bathing(Active)  Current Status(09/26/2022): SBA with AE  Weekly  Goal(09/28/2022): SBA  Discharge Goal: SBA    [OT] Dressing (Lower)(Active)  Current Status(09/26/2022): SBA with setup of jobst  Weekly Goal(09/28/2022): SBA  Discharge Goal: SBA    [OT] Dressing (Upper)(Active)  Current Status(09/26/2022): SBA  Weekly Goal(09/28/2022): SBA  Discharge Goal: SBA    [OT] Eating(Active)  Current Status(09/26/2022): indep  Weekly Goal(09/28/2022): indep  Discharge Goal: indep    [OT] Grooming(Active)  Current Status(09/26/2022): SBA  Weekly Goal(09/28/2022): SBA  Discharge Goal: SBA    [OT] Toileting(Active)  Current Status(09/26/2022): min  Weekly Goal(09/28/2022): SBA  Discharge Goal: SBA        Sphincter Control    [RN] Bladder Management(Active)  Current Status(09/27/2022): Continent of Bladder mostly  Weekly Goal(10/04/2022): Pt continent of bladder  Discharge Goal: Pt continent 100%    [RN] Bowel Management(Active)  Current Status(09/27/2022): Patient has a new colostomy  Weekly Goal(10/04/2022): Patient will maintain skin integrity around stoma  Discharge Goal: Patient will demonstrate techniques to maintain skin integrity  and change appliance independently        Swallow Function    [ST] Swallowing(Resolved)  Current Status(09/26/2022): Regular /thin liquids  Weekly Goal(10/03/2022): Patient will tolerate least restrictive diet with no  overt s/sx of aspiration or penetration.  Discharge Goal: Patient will tolerate least restrictive diet with no overt s/sx  of aspiration or penetration.        Comments: 9/13 Mod A x 1 for transfers, amb 8' in parallel bars Mod A w/ wc  follow.  Min - Mod A toilet transfers.  Slow processing, follows commands well.  Toileting dependent.  Mild - Mod cognitive impairment.  Mech soft w/ thin  liquids.  Goal to discontinue purewick and encourage toileting schedule.  Patient w/ new colostomy.  Progressively decreasing oxygen.  PRN pain  medication.    9/20 CGA w/ rwx  transfers, amb 80' w. CGA / SBA using 0.5L NC.  Min A / CGA  toilet transfers, Min  A shower transfers.  Min A LBD, SBA UBD.  SBA eating,  grooming.  Mod A with toileting.  Mild -Mod cognitive deficits.  Betty mechanical  soft diet with thin liquids.    9/27 SBA w/rwx for transfers, amb 160' SBA rwx, SBA for toilet transfers, CGA  for shower transfers.  SBA with ADL's, needs assist putting on stockings.  Min A  with toileting.  Tolerating Regular with thins.  Cont of bladder, new colostomy  placed, belt helping to maintain colostomy placement.  Right elbow has a scab,  sutures are gone.    Signed by: Elisha Chaparro RN    Physician CoSigned By: Hossein Rivers 09/27/2022 08:02:13

## 2022-09-27 NOTE — THERAPY DISCHARGE NOTE
Inpatient Rehabilitation - IRF Occupational Therapy Treatment Note/Discharge  [facility]     Patient Name: Summer Smith  : 1950  MRN: [id number]  Today's Date: 2022               Admit Date: 2022       ICD-10-CM ICD-9-CM   1. Follow-up exam  Z09 V67.9     Patient Active Problem List   Diagnosis   • Aneurysm (HCC)     Past Medical History:   Diagnosis Date   • COPD (chronic obstructive pulmonary disease) (HCC)    • Elevated cholesterol    • GERD (gastroesophageal reflux disease)      Past Surgical History:   Procedure Laterality Date   • CARDIAC CATHETERIZATION     • COLON SURGERY     • COLONOSCOPY     • SKIN BIOPSY         IRF OT ASSESSMENT FLOWSHEET (last 12 hours)     IRF OT Evaluation and Treatment     Row Name 22 1441          OT Time and Intention    Document Type discharge evaluation  -DN     Mode of Treatment occupational therapy  -DN     Patient Effort good  -DN     Row Name 22 1441          Pain Assessment    Pretreatment Pain Rating 1/10  -DN     Posttreatment Pain Rating /10  -DN     Pain Location generalized  -DN     Row Name 22 1441          Cognition/Psychosocial    Affect/Mental Status (Cognition) WFL  -DN     Orientation Status (Cognition) oriented x 3  -DN     Follows Commands (Cognition) does not follow one-step commands;over 90% accuracy  -DN     Personal Safety Interventions fall prevention program maintained;gait belt  -DN     Cognitive Function attention deficit  -DN     Attention Deficit (Cognition) minimal deficit  -DN     Memory Deficit (Cognition) minimal deficit  -DN     Safety Deficit (Cognition) minimal deficit  -DN     Comment, Cognition needs cues less frequent than on initial  -DN     Row Name 22 1441          Range of Motion (ROM)    Range of Motion ROM is WNL;bilateral upper extremities  -DN     Row Name 22 1441          Strength (Manual Muscle Testing)    Left Hand, Setting 2 (Dynamometer Testing) 38  -DN     Right Hand,  Setting 2 (Dynamometer Testing) 35  -DN     Left Hand: Lateral (Key) Pinch Strength (Pinch Dynamometer Testing) 12  -DN     Left Hand: [place] (Phil) Pinch Strength (Pinch Dynamometer Testing) 10  -DN     Right Hand: Lateral (Key) Pinch Strength (Pinch Dynamometer Testing) 13  -DN     Right Hand: [place] (Phil) Pinch Strength (Pinch Dynamometer Testing) 11  -DN     Row Name 09/27/22 1441          Strength Comprehensive (MMT)    Comment, General Manual Muscle Testing (MMT) Assessment BUE strength is 4/5 thoughout  -DN     Row Name 09/27/22 1441          Sensory Assessment (Somatosensory)    Sensory Assessment (Somatosensory) UE sensation intact  -DN     Row Name 09/27/22 1441          Bathing    [place] Level (Bathing) bathing skills;supervision;verbal cues  -DN     Assistive Device (Bathing) long-handled sponge  -DN     Position (Bathing) supported sitting;supported standing  -DN     Row Name 09/27/22 1441          Upper Body Dressing    [place] Level (Upper Body Dressing) upper body dressing skills;doff;don;pull over garment;set up assistance  -DN     Position (Upper Body Dressing) supported sitting  -DN     Set-up Assistance (Upper Body Dressing) obtain clothing  -DN     Row Name 09/27/22 1441          Lower Body Dressing    [place] Level (Lower Body Dressing) doff;don;pants/bottoms;shoes/slippers;socks;underwear;minimum assist (75% patient effort);supervision  -DN     Position (Lower Body Dressing) supported sitting;supported standing  -DN     Set-up Assistance (Lower Body Dressing) obtain clothing  -DN     Comment (Lower Body Dressing) function varies with clothing selection, does not want to use reacher, is setup for jobst stockings  -DN     Row Name 09/27/22 1441          Grooming    [place] Level (Grooming) grooming skills;deodorant application;hair care, combing/brushing;oral care regimen;supervision  -DN     Position (Grooming) supported sitting;supported standing  -DN      Set-up Assistance (Grooming) obtain supplies  -DN     Row Name 09/27/22 1441          Toileting    [place] Level (Toileting) toileting skills;adjust/manage clothing;perform perineal hygiene;minimum assist (75% patient effort)  -DN     Assistive Device Use (Toileting) grab bar/safety frame;raised toilet seat  -DN     Position (Toileting) supported standing;supported sitting  -DN     Comment (Toileting) pt is dependent for colostomy care, family is independent  -DN     Row Name 09/27/22 1441          Self-Feeding    [place] Level (Self-Feeding) independent  -DN     Row Name 09/27/22 1441          Transfers    [place] Level (Toilet Transfer) standby assist  -DN     Assistive Device (Toilet Transfer) grab bars/safety frame;walker, front-wheeled  -DN     [place] Level (Shower Transfer) contact guard;verbal cues  -DN     Assistive Device (Shower Transfer) grab bar, tub/shower;walker, front-wheeled  -DN     [place] Level (Bathtub Transfer) contact guard  -DN     Assistive Device (Bathtub Transfer) walker, front-wheeled;grab bars/tub rail;tub bench  -DN     Row Name 09/27/22 1441          Toilet Transfer    Type (Toilet Transfer) stand pivot/stand step  -DN     Comment, (Toilet Transfer) family to get toilet safety frame independently  -DN     Row Name 09/27/22 1441          Shower Transfer    Type (Shower Transfer) stand pivot/stand step  -DN     Row Name 09/27/22 1441          Bathtub Transfer    Type (Bathtub Transfer) stand pivot/stand step  -DN     Comment, (Bathtub Transfer) family to get transfer tub bench independently  -DN     Row Name 09/27/22 1441          Shoulder (Therapeutic Exercise)    Shoulder (Therapeutic Exercise) strengthening exercise;wand exercises  -DN     Shoulder AROM (Therapeutic Exercise) bilateral  -DN     Shoulder Strengthening (Therapeutic Exercise) 1 lb free weight  -DN     Shoulder Wand Exercises (Therapeutic Exercise) 10 repetitions;3 sets  yellow theraband exercises   -DN     Row Name 09/27/22 1441          Elbow/Forearm (Therapeutic Exercise)    Elbow/Forearm (Therapeutic Exercise) strengthening exercise  -DN     Elbow/Forearm AROM (Therapeutic Exercise) bilateral;10 repetitions;3 sets  -DN     Elbow/Forearm Strengthening (Therapeutic Exercise) 2 lb free weight  -DN     Row Name 09/27/22 1441          Wrist (Therapeutic Exercise)    Wrist (Therapeutic Exercise) strengthening exercise  -DN     Wrist AROM (Therapeutic Exercise) bilateral;3 sets;10 repetitions  -DN     Wrist Strengthening (Therapeutic Exercise) 2 lb free weight  -DN     Row Name 09/27/22 1441          Hand (Therapeutic Exercise)    Hand (Therapeutic Exercise) strengthening exercise  -DN     Hand AROM/AAROM (Therapeutic Exercise) bilateral  -DN     Hand Strengthening (Therapeutic Exercise) therapy putty  green  -DN     Row Name 09/27/22 1441          Balance    Static Sitting Balance independent  -DN     Static Standing Balance supervision  -DN     Row Name 09/27/22 1441          Positioning and Restraints    Pre-Treatment Position sitting in chair/recliner  -DN     Post Treatment Position wheelchair  -DN     In Bed sitting;with SLP  -DN           User Key  (r) = Recorded By, (t) = Taken By, (c) = Cosigned By    Initials Name Effective Dates    DN Sina Walls, OT 06/16/21 -               Wound 09/09/22 2200 Right upper arm Laceration (Active)   Dressing Appearance open to air 09/27/22 0730   Closure None 09/27/22 0730   Base dry;scab 09/27/22 0730   Drainage Amount none 09/27/22 0730   Dressing Care open to air 09/27/22 0730       Wound 09/09/22 2200 Left midline abdomen Incision (Active)   Dressing Appearance open to air 09/27/22 0730   Closure Approximated 09/27/22 0730   Base clean;dry 09/27/22 0730   Drainage Amount none 09/27/22 0730       Occupational Therapy Education                 Title: PT OT SLP Therapies (Done)     Topic: Occupational Therapy (Done)     Point: ADL training (Done)     Description:    Instruct learner(s) on proper safety adaptation and remediation techniques during self care or transfers.   Instruct in proper use of assistive devices.              Learning Progress Summary           Patient Acceptance, E,TB,D, VU,DU by DN at 9/27/2022 1543    Comment: pt issued HEP for BUEs and pt family independent with all adls and functional transfers   Family Acceptance, E,TB,D, VU,DU by DN at 9/27/2022 1543    Comment: pt issued HEP for BUEs and pt family independent with all adls and functional transfers   Significant Other Acceptance, E, VU by WN at 9/23/2022 2340      Show all documentation for this point (7)                 Point: Home exercise program (Done)     Description:   Instruct learner(s) on appropriate technique for monitoring, assisting and/or progressing therapeutic exercises/activities.              Learning Progress Summary           Patient Acceptance, E,TB,D, VU,DU by DN at 9/27/2022 1543    Comment: pt issued HEP for BUEs and pt family independent with all adls and functional transfers   Family Acceptance, E,TB,D, VU,DU by DN at 9/27/2022 1543    Comment: pt issued HEP for BUEs and pt family independent with all adls and functional transfers   Significant Other Acceptance, E, VU by WN at 9/23/2022 2340      Show all documentation for this point (7)                 Point: Precautions (Done)     Description:   Instruct learner(s) on prescribed precautions during self-care and functional transfers.              Learning Progress Summary           Patient Acceptance, E,TB,D, VU,DU by DN at 9/27/2022 1543    Comment: pt issued HEP for BUEs and pt family independent with all adls and functional transfers   Family Acceptance, E,TB,D, VU,DU by DN at 9/27/2022 1543    Comment: pt issued HEP for BUEs and pt family independent with all adls and functional transfers   Significant Other Acceptance, E, VU by WN at 9/23/2022 2340      Show all documentation for this point (7)                 Point:  Body mechanics (Done)     Description:   Instruct learner(s) on proper positioning and spine alignment during self-care, functional mobility activities and/or exercises.              Learning Progress Summary           Patient Acceptance, E,TB,D, VU,DU by DN at 9/27/2022 1543    Comment: pt issued HEP for BUEs and pt family independent with all adls and functional transfers   Family Acceptance, E,TB,D, VU,DU by DN at 9/27/2022 1543    Comment: pt issued HEP for BUEs and pt family independent with all adls and functional transfers   Significant Other Acceptance, E, VU by WN at 9/23/2022 2340      Show all documentation for this point (7)                             User Key     Initials Effective Dates Name Provider Type Discipline    DN 06/16/21 -  Sina Walls OT Occupational Therapist OT    WN 08/23/22 -  Sancho Eugene RN Registered Nurse Nurse                OT Recommendation and Plan  Anticipated Discharge Disposition (OT): home, home with assist           OT IRF GOALS     Row Name 09/27/22 1400 09/23/22 1100 09/16/22 1200       Transfer Goal 1 (OT-IRF)    Activity/Assistive Device (Transfer Goal 1, OT-IRF) toilet;walk-in shower;tub  -DN toilet;shower chair  -DN toilet;shower chair  -DN    [place] Level (Transfer Goal 1, OT-IRF) -- contact guard required  -DN contact guard required  -DN    Time Frame (Transfer Goal 1, OT-IRF) -- short-term goal (STG)  -DN short-term goal (STG)  -DN    Progress/Outcomes (Transfer Goal 1, OT-IRF) goal met  -DN goal met  -DN goal met;goal revised this date  -DN       Transfer Goal 2 (OT-IRF)    Activity/Assistive Device (Transfer Goal 2, OT-IRF) toilet;tub;walk-in shower  -DN toilet;shower chair  -DN toilet;shower chair  -DN    [place] Level (Transfer Goal 2, OT-IRF) supervision required;contact guard required  -DN supervision required  -DN supervision required  -DN    Time Frame (Transfer Goal 2, OT-IRF) long-term goal (LTG)  -DN long-term goal (LTG)  -DN  long-term goal (LTG)  -DN    Progress/Outcomes (Transfer Goal 2, OT-IRF) goal partially met  -DN goal ongoing  -DN goal revised this date  -DN       Bathing Goal 1 (OT-IRF)    Activity/Device (Bathing Goal 1, OT-IRF) bathing skills, all  -DN bathing skills, all  -DN bathing skills, all  -DN    [place] Level (Bathing Goal 1, OT-IRF) -- minimum assist (75% or more patient effort);moderate assist (50-74% patient effort)  -DN minimum assist (75% or more patient effort);moderate assist (50-74% patient effort)  -DN    Time Frame (Bathing Goal 1, OT-IRF) -- short-term goal (STG)  -DN short-term goal (STG)  -DN    Progress/Outcomes (Bathing Goal 1, OT-IRF) goal met  -DN goal met  -DN goal not met;goal ongoing  -DN       Bathing Goal 2 (OT-IRF)    Activity/Device (Bathing Goal 2, OT-IRF) bathing skills, all  -DN bathing skills, all  -DN bathing skills, all  -DN    [place] Level (Bathing Goal 2, OT-IRF) supervision required  -DN supervision required  -DN supervision required  -DN    Time Frame (Bathing Goal 2, OT-IRF) -- long-term goal (LTG)  -DN long-term goal (LTG)  -DN    Progress/Outcomes (Bathing Goal 2, OT-IRF) goal met  -DN goal ongoing  -DN goal revised this date  -DN       UB Dressing Goal 1 (OT-IRF)    Activity/Device (UB Dressing Goal 1, OT-IRF) upper body dressing  -DN upper body dressing  -DN upper body dressing  -DN    [place] (UB Dress Goal 1, OT-IRF) standby assist  -DN standby assist  -DN standby assist  -DN    Time Frame (UB Dressing Goal 1, OT-IRF) short-term goal (STG)  -DN short-term goal (STG)  -DN short-term goal (STG)  -DN    Progress/Outcomes (UB Dressing Goal 1, OT-IRF) goal met  -DN goal met  -DN goal met;goal revised this date  -DN       UB Dressing Goal 2 (OT-IRF)    Activity/Device (UB Dressing Goal 2, OT-IRF) upper body dressing  -DN upper body dressing  -DN upper body dressing  -DN    [place] (UB Dress Goal 2, OT-IRF) standby assist  -DN standby assist  -DN standby assist   -DN    Time Frame (UB Dressing Goal 2, OT-IRF) -- long-term goal (LTG)  -DN long-term goal (LTG)  -DN    Progress/Outcomes (UB Dressing Goal 2, OT-IRF) goal met  -DN goal ongoing  -DN goal ongoing  -DN       LB Dressing Goal 1 (OT-IRF)    Activity/Device (LB Dressing Goal 1, OT-IRF) lower body dressing  -DN lower body dressing  -DN lower body dressing  -DN    [place] (LB Dressing Goal 1, OT-IRF) moderate assist (50-74% patient effort);maximum assist (25-49% patient effort)  -DN moderate assist (50-74% patient effort);maximum assist (25-49% patient effort)  -DN moderate assist (50-74% patient effort);maximum assist (25-49% patient effort)  -DN    Time Frame (LB Dressing Goal 1, OT-IRF) short-term goal (STG)  -DN short-term goal (STG)  -DN short-term goal (STG)  -DN    Progress/Outcomes (LB Dressing Goal 1, OT-IRF) goal met  -DN goal met  -DN goal not met  -DN       LB Dressing Goal 2 (OT-IRF)    Activity/Device (LB Dressing Goal 2, OT-IRF) lower body dressing  -DN lower body dressing  -DN lower body dressing  -DN    [place] (LB Dressing Goal 2, OT-IRF) minimum assist (75% or more patient effort)  -DN minimum assist (75% or more patient effort)  -DN minimum assist (75% or more patient effort)  -DN    Time Frame (LB Dressing Goal 2, OT-IRF) long-term goal (LTG)  -DN long-term goal (LTG)  -DN long-term goal (LTG)  -DN    Progress/Outcomes (LB Dressing Goal 2, OT-IRF) goal met  -DN goal ongoing  -DN goal ongoing  -DN       Grooming Goal 1 (OT-IRF)    Activity/Device (Grooming Goal 1, OT-IRF) grooming skills, all  -DN grooming skills, all  -DN grooming skills, all  -DN    [place] (Grooming Goal 1, OT-IRF) supervision required  -DN supervision required  -DN supervision required  -DN    Time Frame (Grooming Goal 1, OT-IRF) short-term goal (STG)  -DN short-term goal (STG)  -DN short-term goal (STG)  -DN    Progress/Outcomes (Grooming Goal 1, OT-IRF) goal met  -DN goal met  -DN goal met;goal revised this date   -DN       Grooming Goal 2 (OT-IRF)    Activity/Device (Grooming Goal 2, OT-IRF) grooming skills, all  -DN grooming skills, all  -DN grooming skills, all  -DN    [place] (Grooming Goal 2, OT-IRF) set-up required  -DN set-up required  -DN set-up required  -DN    Time Frame (Grooming Goal 2, OT-IRF) long-term goal (LTG)  -DN long-term goal (LTG)  -DN long-term goal (LTG)  -DN    Progress/Outcomes (Grooming Goal 2, OT-IRF) goal ongoing  -DN goal ongoing  -DN goal ongoing  -DN       Toileting Goal 1 (OT-IRF)    Activity/Device (Toileting Goal 1, OT-IRF) toileting skills, all  -DN toileting skills, all  -DN toileting skills, all  -DN    [place] Level (Toileting Goal 1, OT-IRF) moderate assist (50-74% patient effort);maximum assist (25-49% patient effort)  -DN moderate assist (50-74% patient effort);maximum assist (25-49% patient effort)  -DN moderate assist (50-74% patient effort);maximum assist (25-49% patient effort)  -DN    Progress/Outcomes (Toileting Goal 1, OT-IRF) goal met  -DN goal met  -DN goal not met  -DN    Time Frame (Toileting Goal 1, OT-IRF) short-term goal (STG)  -DN short-term goal (STG)  -DN short-term goal (STG)  -DN       Toileting Goal 2 (OT-IRF)    Activity/Device (Toileting Goal 2, OT-IRF) toileting skills, all  -DN toileting skills, all  -DN toileting skills, all  -DN    [place] Level (Toileting Goal 2, OT-IRF) minimum assist (75% or more patient effort)  -DN minimum assist (75% or more patient effort)  -DN minimum assist (75% or more patient effort)  -DN    Progress/Outcomes (Toileting Goal 2, OT-IRF) goal met  -DN goal ongoing  -DN goal ongoing  -DN    Time Frame (Toileting Goal 2, OT-IRF) long-term goal (LTG)  -DN long-term goal (LTG)  -DN long-term goal (LTG)  -DN       Strength Goal 1 (OT-IRF)    Strength Goal 1 (OT-IRF) Pt to increase MMT score to >3+/5 in order to increase independence with ADLs.  -DN Pt to increase MMT score to >3+/5 in order to increase independence with  ADLs.  -DN Pt to increase MMT score to >3+/5 in order to increase independence with ADLs.  -DN    Time Frame (Strength Goal 1, OT-IRF) long-term goal (LTG)  -DN long-term goal (LTG)  -DN long-term goal (LTG)  -DN    Progress/Outcomes (Strength Goal 1, OT-IRF) goal met  -DN goal ongoing  -DN goal not met;goal ongoing  -DN       Caregiver Training Goal 1 (OT-IRF)    Caregiver Training Goal 1 (OT-IRF) Pt's family to participate in family teaching to increase safety for discharge.  -DN Pt's family to participate in family teaching to increase safety for discharge.  -DN Pt's family to participate in family teaching to increase safety for discharge.  -DN    Time Frame (Caregiver Training Goal 1, OT-IRF) long-term goal (LTG)  -DN long-term goal (LTG)  -DN long-term goal (LTG)  -DN    Progress/Outcomes (Caregiver Training Goal 1, OT-IRF) goal met  -DN goal ongoing  -DN goal ongoing  -DN          User Key  (r) = Recorded By, (t) = Taken By, (c) = Cosigned By    Initials Name Provider Type    Sina Currie OT Occupational Therapist                    Time Calculation:    Time Calculation- OT     Row Name 09/27/22 0800             Time Calculation- OT    OT Start Time 0800  -DN      OT Stop Time 0900  -DN      OT Time Calculation (min) 60 min  -DN            User Key  (r) = Recorded By, (t) = Taken By, (c) = Cosigned By    Initials Name Provider Type    Sina Currie OT Occupational Therapist                Therapy Charges for Today     Code Description Service Date Service Provider Modifiers Qty    [id number]  OT SELF CARE/MGMT/TRAIN EA 15 MIN 9/26/2022 Sina Walls OT GO 5    [id number] HC OT SELF CARE/MGMT/TRAIN EA 15 MIN 9/27/2022 Sina Walls OT GO 2    [id number]  OT THER PROC EA 15 MIN 9/27/2022 Sina Walls OT GO 2               OT Discharge Summary  Anticipated Discharge Disposition (OT): home, home with assist  Reason for Discharge: Discharge from facility  Outcomes Achieved: Patient able to  partially acheive established goals  Discharge Destination: Home (pt issue BUE HEP and family is to get tub bench and toilet safety frame independently)    Sina Walls, OT  9/27/2022

## 2022-09-27 NOTE — PROGRESS NOTES
Inpatient Rehabilitation Functional Measures Assessment and Plan of Care    Plan of Care  Updated Problems/Interventions  Mobility    [OT] Toilet Transfers(Active)  Current Status(09/26/2022): SBA  Weekly Goal(09/28/2022): SBA  Discharge Goal: SBA    [OT] Tub/Shower Transfers(Active)  Current Status(09/26/2022): CGA  Weekly Goal(09/28/2022): SBA  Discharge Goal: SBA        Self Care    [OT] Bathing(Active)  Current Status(09/26/2022): SBA with AE  Weekly Goal(09/28/2022): SBA  Discharge Goal: SBA    [OT] Dressing (Lower)(Active)  Current Status(09/26/2022): SBA with setup of jobst  Weekly Goal(09/28/2022): SBA  Discharge Goal: SBA    [OT] Dressing (Upper)(Active)  Current Status(09/26/2022): SBA  Weekly Goal(09/28/2022): SBA  Discharge Goal: SBA    [OT] Eating(Active)  Current Status(09/26/2022): indep  Weekly Goal(09/28/2022): indep  Discharge Goal: indep    [OT] Grooming(Active)  Current Status(09/26/2022): SBA  Weekly Goal(09/28/2022): SBA  Discharge Goal: SBA    [OT] Toileting(Active)  Current Status(09/26/2022): min  Weekly Goal(09/28/2022): SBA  Discharge Goal: SBA    Functional Measures  NIKKI Eating:  Branch  NIKKI Grooming: Branch  NIKKI Bathing:  Branch  NIKKI Upper Body Dressing:  Branch  NIKKI Lower Body Dressing:  Branch  NIKKI Toileting:  Branch    NIKKI Bladder Management  Level of Assistance:  Branch  Frequency/Number of Accidents this Shift:  Branch    NIKKI Bowel Management  Level of Assistance: Branch  Frequency/Number of Accidents this Shift: Branch    NIKKI Bed/Chair/Wheelchair Transfer:  Branch  NIKKI Toilet Transfer:  Branch  NIKKI Tub/Shower Transfer:  Branch    Previously Documented Mode of Locomotion at Discharge: Field  NIKKI Expected Mode of Locomotion at Discharge: Branch  NIKKI Walk/Wheelchair:  Branch  NIKKI Stairs:  Branch    NIKKI Comprehension:  Branch  NIKKI Expression:  Branch  NIKKI Social Interaction:  Branch  NIKKI Problem Solving:  Branch  NIKKI Memory:  Branch    Therapy Mode Minutes  Occupational Therapy:  Branch  Physical Therapy: Branch  Speech Language Pathology:  Branch    Signed by: Sina Walls OTR/L

## 2022-09-27 NOTE — PROGRESS NOTES
Reviewed progress and plans for d/c tomorrow, 9/28 with patient. Patient stated one of her daughters is staying tonight and  coming tomorrow morning for discharge home. She stated her daughters and  felt comfortable with teaching yesterday from ostomy nurse. Discussed [facility] to follow at home for NSG and PT. Patient feels ready to go home tomorrow. Will assist with plans.

## 2022-09-27 NOTE — PROGRESS NOTES
SECTION GG      Self Care Performance Discharge:   Oral Hygiene: [place] sets up or cleans up; patient completes activity. [place]  assists only prior to or following the activity.   Toileting Hygiene: : [place] does less than half the effort. [place] lifts, holds  or supports trunk or limbs but provides less than half the effort.   Shower/Bathe Self: [place] provides verbal cues and/or touching/steadying and/or  contact guard assistance as patient completes activity.   Upper Body Dressing: [place] provides verbal cues and/or touching/steadying  and/or contact guard assistance as patient completes activity.   Lower Body Dressing: [place] provides verbal cues and/or touching/steadying  and/or contact guard assistance as patient completes activity.   Putting On/Taking Off Footwear: [place] provides verbal cues and/or  touching/steadying and/or contact guard assistance as patient completes  activity.    Mobility Toilet Transfer Discharge: [place] provides verbal cues or  touching/steadying assistance as patient completes activity.    Signed by: Sina Walls OTR/MARIELOS

## 2022-09-27 NOTE — THERAPY DISCHARGE NOTE
Inpatient Rehabilitation - IRF Speech Language Pathology /Discharge  [facility]     Patient Name: Summer Smith  : 1950  MRN: [id number]  Today's Date: 2022         Admit Date: 2022    Visit Dx:     ICD-10-CM ICD-9-CM   1. Follow-up exam  Z09 V67.9     Patient Active Problem List   Diagnosis   • Aneurysm (HCC)       SLP Recommendation and Plan      All goals met within established timeline. No speech therapy warranted following discharge.      Anticipated Discharge Disposition (SLP): home with  care (22 1508)                           SLP GOALS     Row Name 22 1500 22 0900 22 1100       Attention Goal 1 (SLP)    Improve Attention by Goal 1 (SLP) attending to task;complete selective attention task;80%;independently (over 90% accuracy)  -SR attending to task;complete selective attention task;80%;independently (over 90% accuracy)  -SR --    Time Frame (Attention Goal 1, SLP) by discharge  -SR by discharge  -SR --    Progress (Attention Goal 1, SLP) 100%;independently (over 90% accuracy)  -SR 90%;independently (over 90% accuracy)  -SR --    Progress/Outcomes (Attention Goal 1, SLP) goal met  -SR goal met  -SR --       Orientation Goal 1 (SLP)    Progress/Outcomes (Orientation Goal 1, SLP) goal met  -SR -- --       Memory Skills Goal 1 (SLP)    Improve Memory Skills Through Goal 1 (SLP) -- recalling unrelated word lists immediately;recalling unrelated word lists with an imposed delay;repeat list in sequential order;listen to a paragraph and answer questions;70%;independently (over 90% accuracy)  -SR --    Time Frame (Memory Skills Goal 1, SLP) -- by discharge  -SR --    Progress/Outcomes (Memory Skills Goal 1, SLP) -- goal met  -SR --    Comment (Memory Skills Goal 1, SLP) -- Patient recalled 4 words in sequential order with 90% acc given no cues.  -SR --       Organizational Skills Goal 1 (SLP)    Improve Thought Organization Through Goal 1 (SLP) -- completing mental  manipulation task;80%;independently (over 90% accuracy)  -SR completing mental manipulation task;80%;independently (over 90% accuracy)  -SR    Time Frame (Thought Organization Skills Goal 1, SLP) -- by discharge  -SR by discharge  -SR    Progress (Thought Organization Skills Goal 1, SLP) -- 90%;independently (over 90% accuracy)  -SR --    Progress/Outcomes (Thought Organization Skills Goal 1, SLP) -- goal met  -SR goal partially met  -SR    Comment (Thought Organization Skills Goal 1, SLP) -- -- Word circles; Patient unscrambled 8-12 letter word with 80% accuracy given no cues.  -SR       Functional Problem Solving Skills Goal 1 (SLP)    Improve Problem Solving Through Goal 1 (SLP) determine solutions to simple ADL/safety problems;determine solutions to multifactorial problems;sequence steps in a task;complete organization/home management task;70%;independently (over 90% accuracy)  -SR determine solutions to simple ADL/safety problems;determine solutions to multifactorial problems;sequence steps in a task;complete organization/home management task;70%;independently (over 90% accuracy)  -SR --    Time Frame (Problem Solving Goal 1, SLP) by discharge  -SR by discharge  -SR --    Progress (Problem Solving Goal 1, SLP) independently (over 90% accuracy);100%  -SR -- --    Progress/Outcomes (Problem Solving Goal 1, SLP) goal met  -SR goal met  -SR --    Comment (Problem Solving Goal 1, SLP) Patient sequenced steps in a task with 100% accuracy given no cues.  -SR Patient completed higher level deduction puzzle (3x6 grid) with 78% accuracy given no cues. She followed written directions and used process of elimination as a strategy to determine the final solution.  -SR --       Functional Math Skills Goal 1 (SLP)    Improve Functional Math Skills Through Goal 1 (SLP) complete word problems involving time;complete functional math task;70%;independently (over 90% accuracy)  -SR -- complete word problems involving time;complete  functional math task;70%;independently (over 90% accuracy)  -SR    Time Frame (Functional Math Skills Goal 1, SLP) by discharge  -SR -- by discharge  -SR    Progress/Outcomes (Functional Math Skills Goal 1, SLP) goal met  -SR -- goal partially met  -SR    Comment (Functional Math Skills Goal 1, SLP) Patient answered math-based scenerios using restaurant menu with 100% accuracy given no cues. No calculator assist.  -SR -- Patient completed checkbook balance activity with 80% accuracy given no cues. No calculator assist.  -SR       Executive Functional Skills Goal 1 (SLP)    Improve Executive Function Skills Goal 1 (SLP) demonstrate awareness of deficit;identify strategies, strengths, limitations;time management activity;complex organization/planning activity;home management activity;70%;independently (over 90% accuracy)  -SR -- --    Time Frame (Executive Function Skills Goal 1, SLP) by discharge  -SR -- --    Progress/Outcomes (Executive Function Skills Goal 1, SLP) goal met  -SR -- --    Comment (Executive Function Skills Goal 1, SLP) Reviewed activities for patient to complete at home following discharge. Will provide patient with a list of apps for her iPad that target higher level reasoning/problem solving.  -SR -- --       Right Hemisphere Function Goal 1 (SLP)    Improve Right Hemisphere Function Through Goal 1 (SLP) -- complete visuo-spatial activities (visual closure, trail making, mazes;with minimal cues (75-90%)  -SR --    Time Frame (Right Hemisphere Function Goal 1, SLP) -- by discharge  -SR --    Progress/Outcomes (Right Hemisphere Function Goal 1, SLP) -- goal met  -SR --    Row Name 09/26/22 0900             Attention Goal 1 (SLP)    Improve Attention by Goal 1 (SLP) attending to task;complete selective attention task;80%;independently (over 90% accuracy)  -SR      Time Frame (Attention Goal 1, SLP) by discharge  -SR      Progress/Outcomes (Attention Goal 1, SLP) goal partially met  -SR      Comment  (Attention Goal 1, SLP) Patient followed written directions with 80% acc given no cues; 100% acc given min cues.  Cues were provided for attn to detail.  -SR              Functional Problem Solving Skills Goal 1 (SLP)    Improve Problem Solving Through Goal 1 (SLP) determine solutions to simple ADL/safety problems;determine solutions to multifactorial problems;sequence steps in a task;complete organization/home management task;70%;independently (over 90% accuracy)  -SR      Time Frame (Problem Solving Goal 1, SLP) by discharge  -SR      Progress/Outcomes (Problem Solving Goal 1, SLP) goal partially met  -SR      Comment (Problem Solving Goal 1, SLP) Patient followed directions and organized information on a chart (3x4 grid) during deductive reasoning/logic activity with 75% acc given no cues.  -SR              Executive Functional Skills Goal 1 (SLP)    Improve Executive Function Skills Goal 1 (SLP) demonstrate awareness of deficit;identify strategies, strengths, limitations;time management activity;complex organization/planning activity;home management activity;70%;independently (over 90% accuracy)  -SR      Time Frame (Executive Function Skills Goal 1, SLP) by discharge  -SR      Progress/Outcomes (Executive Function Skills Goal 1, SLP) goal partially met  -SR      Comment (Executive Function Skills Goal 1, SLP) Patient answered questions involving time with 80% acc given no cues.  -SR            User Key  (r) = Recorded By, (t) = Taken By, (c) = Cosigned By    Initials Name Provider Type    Marlen Becerra, SLP Speech and Language Pathologist                EDUCATION  The patient has been educated in the following areas:   Cognitive Impairment.              Time Calculation:    Time Calculation- SLP     Row Name 09/27/22 1509 09/27/22 1035          Time Calculation- SLP    SLP Start Time 1430  -SR 0900  -SR     SLP Stop Time 1500  -SR 0930  -SR     SLP Time Calculation (min) 30 min  -SR 30 min  -SR            User Key  (r) = Recorded By, (t) = Taken By, (c) = Cosigned By    Initials Name Provider Type    Marlen Becerra, SLP Speech and Language Pathologist                Therapy Charges for Today     Code Description Service Date Service Provider Modifiers Qty    88772222043 HC ST DEV OF COGN SKILLS INITIAL 15 MIN 9/26/2022 Marlen Ochoa SLP  1    88255050657 HC ST DEV OF COGN SKILLS EACH ADDT'L 15 MIN 9/26/2022 Marlen Ochoa SLP  3    08601244272 HC ST DEV OF COGN SKILLS INITIAL 15 MIN 9/27/2022 Marlen Ochoa SLP  1    61618326620 HC ST DEV OF COGN SKILLS EACH ADDT'L 15 MIN 9/27/2022 Marlen Ochoa SLP  3               SLP Discharge Summary  Anticipated Discharge Disposition (SLP): home with 24/7 care  Reason for Discharge: discharge from this facility  Progress Toward Achieving Short/long Term Goals: all goals met within established timelines  Discharge Destination: home w/ 24/7 care    RAGHU Zavala  9/27/2022      (2) assistive person

## 2022-09-27 NOTE — PLAN OF CARE
Goal Outcome Evaluation:  Plan of Care Reviewed With: patient        Progress: improving  Outcome Evaluation: A&Ox4. Assist x1. Walker.  checked off to assist patient to the bathroom. Colostomy in place. Abdominal incision approximated and scabbed, SPRING. Right elbow scabbed over. No complaints of pain. No unsafe behaviors. Participated with therapies. Jobst stockings during day and ace wraps at night to help with edema. Expecting to discharge 9/28.

## 2022-09-27 NOTE — PROGRESS NOTES
Inpatient Rehabilitation Plan of Care Note    Plan of Care  Care Plan Reviewed - No updates at this time.    Pain    [RN] Pain Management(Active)  Current Status(09/27/2022): Pain currently at tolerable rate  Weekly Goal(10/04/2022): Pain will be managed at tolerable rate  Discharge Goal: Patient will be able to tolerate pain and manage    Performed Intervention(s)  Pain medication as requested and available  Repositioning and wound care      Psychosocial    [RN] Coping/Adjustment(Active)  Current Status(09/27/2022): Pt is adjusting to bowel resection/colostomy  Weekly Goal(10/04/2022): Patient adjusted to new bowel regimen  Discharge Goal: Patient coping well with new colostomy    Performed Intervention(s)  supportive family  Pt given opportunity to express concerns/feelings      Safety    [RN] Potential for Injury(Active)  Current Status(09/27/2022): Patient at risk for falls due to weakness from  abdominal surger  Weekly Goal(10/04/2022): Patient will use call light appropriately  Discharge Goal: Patient will be aware of risk of falls    Performed Intervention(s)  Personal items and call light w/in reach  falls precaution  hourly rounding      Sphincter Control    [RN] Bladder Management(Active)  Current Status(09/27/2022): Continent of Bladder mostly  Weekly Goal(10/04/2022): Pt continent of bladder  Discharge Goal: Pt continent 100%    [RN] Bowel Management(Active)  Current Status(09/27/2022): Patient has a new colostomy  Weekly Goal(10/04/2022): Patient will maintain skin integrity around stoma  Discharge Goal: Patient will demonstrate techniques to maintain skin integrity  and change appliance independently    Performed Intervention(s)  Incontinance products and elo care  answer call light promptly  WCON consult  skin/stoma assessment    Signed by: Massimo Lopez RN

## 2022-09-27 NOTE — NURSING NOTE
Wound/ostomy - checked pouch and is intact, no leaking, is wearing a belt. Underwear is pretty tight and elastic band lays directly over the stoma, she agrees they feel tighter than she prefers. She suggested to snip the elastic band which did help loosen. Patient states that she has underwear at home that fit better and sit on abdomen higher than these do. Patient has ostomy supplies for 4 pouch changes. Will order samples from [employer] and [place], flat and light convex barriers from [employer] 2 piece disposable and [place] 1 piece soft convex and 2 piece.

## 2022-09-27 NOTE — PROGRESS NOTES
SECTION GG      Mobility Performance Discharge:     Roll Left and Right: [place] provides verbal cues and/or touching/steadying  and/or contact guard assistance as patient completes activity. Assistance may be  provided throughout the activity or intermittently.   Sit to Lying: [place] provides verbal cues and/or touching/steadying and/or  contact guard assistance as patient completes activity. Assistance may be  provided throughout the activity or intermittently.   Lying to Sitting on Side of Bed: [place] provides verbal cues and/or  touching/steadying and/or contact guard assistance as patient completes  activity. Assistance may be provided throughout the activity or intermittently.   Sit to Stand: [place] provides verbal cues and/or touching/steadying and/or  contact guard assistance as patient completes activity. Assistance may be  provided throughout the activity or intermittently.   Chair/Bed to Chair Transfer: [place] provides verbal cues and/or  touching/steadying and/or contact guard assistance as patient completes  activity. Assistance may be provided throughout the activity or intermittently.   Car Transfer: [place] provides verbal cues and/or touching/steadying and/or  contact guard assistance as patient completes activity. Assistance may be  provided throughout the activity or intermittently.   Walk 10 Feet:   [place] provides verbal cues and/or touching/steadying and/or  contact guard assistance as patient completes activity. Assistance may be  provided throughout the activity or intermittently.  Walk 50 Feet with 2 Turns:   [place] provides verbal cues and/or  touching/steadying and/or contact guard assistance as patient completes  activity. Assistance may be provided throughout the activity or intermittently.  Walk 150 Feet:   [place] provides verbal cues and/or touching/steadying and/or  contact guard assistance as patient completes activity. Assistance may be  provided throughout the activity or  intermittently.  Walking 10 Feet on Uneven Surfaces:   [place] provides verbal cues and/or  touching/steadying and/or contact guard assistance as patient completes  activity. Assistance may be provided throughout the activity or intermittently.  1 Step Over Curb or Up/Down Stair:   [place] provides verbal cues and/or  touching/steadying and/or contact guard assistance as patient completes  activity. Assistance may be provided throughout the activity or intermittently.  4 Steps Up and Down, With/Without Rail:   [place] provides verbal cues and/or  touching/steadying and/or contact guard assistance as patient completes  activity. Assistance may be provided throughout the activity or intermittently.  12 Steps Up and Down, With/Without Rail:   Not attempted due to medical or  safety concerns.  Picking up an Object:   Not attempted due to medical or safety concerns. Uses  Wheelchair and/or Scooter: No    Section J. Health Conditions (Pain):  Pain Interference with Therapy Activities:   Rarely or not at all  Pain Interference with Day-to-Day Activities:   Rarely or not at all    Signed by: Ale Garza, PT

## 2022-09-27 NOTE — PROGRESS NOTES
Inpatient Rehabilitation Plan of Care Note    Plan of Care  Care Plan Reviewed - No updates at this time.    Pain    Performed Intervention(s)  Pain medication as requested and available  Repositioning and wound care      Sphincter Control    Performed Intervention(s)  Incontinance products and elo care  answer call light promptly  WCON consult  skin/stoma assessment      Psychosocial    Performed Intervention(s)  supportive family  Pt given opportunity to express concerns/feelings      Safety    Performed Intervention(s)  Personal items and call light w/in reach  falls precaution  hourly rounding    Signed by: Joana Mahan RN

## 2022-09-28 VITALS
HEIGHT: 70 IN | RESPIRATION RATE: 18 BRPM | BODY MASS INDEX: 34.03 KG/M2 | SYSTOLIC BLOOD PRESSURE: 98 MMHG | WEIGHT: 237.7 LBS | HEART RATE: 67 BPM | DIASTOLIC BLOOD PRESSURE: 57 MMHG | OXYGEN SATURATION: 96 % | TEMPERATURE: 97.9 F

## 2022-09-28 RX ADMIN — Medication 2000 UNITS: at 07:43

## 2022-09-28 RX ADMIN — ACETAMINOPHEN 650 MG: 325 TABLET, FILM COATED ORAL at 07:46

## 2022-09-28 RX ADMIN — PANTOPRAZOLE SODIUM 40 MG: 40 TABLET, DELAYED RELEASE ORAL at 05:34

## 2022-09-28 RX ADMIN — LISINOPRIL 5 MG: 5 TABLET ORAL at 07:43

## 2022-09-28 RX ADMIN — AMLODIPINE BESYLATE 5 MG: 5 TABLET ORAL at 07:43

## 2022-09-28 RX ADMIN — SERTRALINE 25 MG: 25 TABLET, FILM COATED ORAL at 07:43

## 2022-09-28 NOTE — DISCHARGE SUMMARY
[facility] - REHABILITATION UNIT    JASBIR MAHMOOD  1950    ADMIT DATE:  9/9/2022  9:57 PM  DISCHARGE DATE:  09/28/22      CHIEF COMPLAINT:    Immobility syndrome  S/p colostomy    HISTORY OF PRESENT ILLNESS:    72 yo female was initially at [facility] and transferred to [facility] for L MCA aneurysm, multi focal bilat basilar pneumonia, diverticulitis with microperforation and small abscess s/p ex lap with bowel resection and colostomy. Is on a soft diet with supplements. Develop Afib. Required TPN. ECHO found moderately calcified aortic valve with moderate aortic stenosis. [place] did not do any intervention for aneurysm-  Plan outpt angiogram. Wound culture grew pseudomonas and staph hominis- flagyl and cipro are now finished. Prior to admission had been treated for UTI. Developed abd pain- that is what took her to hospital initially. Had arm laceration that required sutures.        HOSPITAL COURSE:    Patient participated in a comprehensive acute inpatient rehabilitation program.     During the hospital stay the following areas were addressed:      Immobility Syndrome  1.  MCA aneurysm- will have further work up as outpt     2.  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 Family and patient teaching on colostomy     3.  HTN- stable on norvasc, lisinopril. Running low.  Decreased lisinopril to 5 mg.   September 16-blood pressure 128-131/60-63  September 27-blood pressure 118//70     4.  Depression - low dose zoloft     5.  DVT proph- was on lovenox-Will continue 40 mg daily for now     6. Pulm - nasal cannula O2. Wean, keep sats > 90%  September 13-Taper down to 2 L  September 16-sats 92% on room air     7. Right lateral elbow laceration - August 31 - sutured  September 16-as incisions located over a mobile joint, will continue sutures until Monday, September 19.  September 19-laceration not inspected today- will plan to assess on rounds tomorrow  for suture removal  September 21-irregular edge to the traumatic laceration but it appears healed.  21 days sutured.  We will remove every other suture today and then the remainder in 2 days if integrity intact.  May have 1 additional suture under a scab.  -will try to get the note on placement of suture  September 22-remove remaining sutures     8.  Orthostatic hypotension-she has bilateral lower extremity edema but will hold on adding diuretic given her orthostatic hypotension.  We will try thigh-high Jobst stockings 15-20 mm compression for both orthostasis as well as edema.  Echocardiogram recently showed ejection fraction 65%.     September 22-edema improved-continue compression stocking     9 Edema - Jobst - better  Sept 25 - right basilar crackles today. Hydrochlorothiazide 12.5 mg x one dose today.  September 2638-iybbcvpezvj-gycsikd.  Reweigh today.     TEAM CONF - SEPT 13 - TRANSFERS MOD . GAIT 8 FEET PARALLEL BARS MOD ASSIST. TOILET TRANSFERS MIN MOD. BATH MOD. LBD DEP. UBD MIN. EATING MIN. GROOMING MIN. TOILETING DEP. FATIGUES. USING PUREWICK.   OSTOMY EDUCATION. MEPILEX TO RIGHT ELBOW LACERATION. COGNITION - MILD TO MODERATE COGNITIVE IMPAIRMENT. VISUAL SPATIAL DEFICITS. OSTOMY EDUCATION. ABDOMINAL INCISION HEALING WITH JUST ONE SMALL AREA INFERIORLY WITH SLIGHT DRAINAGE. WILL DISCONTINUE PUREWICK. TO DO TIMED VOIDS.  PULMONARY - WEAN O2 .   ELOS - THREE WEEKS     September 16-Transfers contact-guard min assist.  Ambulated up to 45 feet contact-guard rolling walker.        TEAM CONF - SEPT 20 - TRANSFERS CTG MIN. CAR TRANSFERS MOD ASSIST.   GAIT 80 FEET RW CTG.   BATH MIN. LBD MIN UBD SBA. EATING SBA. TOILETING MIN ASSIST.   COGNITION - CUES TO GO STEP BY STEP AND NEEDS TO RE-READ DIRECTIONS. DEFICITS WITH WORKING MEMORY.  BNE (Active)  Att'n. - WNL  Exec. Fx. - Mildly Imp.  Rsng/Jgmnt - WNL  Arith - Mod. Imp.  Visuospatial Skills - Mildly Imp.  Visual Mem. - MIldly Imp.  Verbal Mem. - Mod. Imp., improved  with cues  Emot - Pt reported improved mood  OCCASIONAL TEARFUL WITH PASTORAL COUNSELING.  EDUCATION ON COLOSTOMY.  EDEMA BETTER WITH COMPRESSION STOCKINGS.  ELOS - ONE WEEK.     TEAM CONF - SEPT 27 -  Mild cognitive impairment characterized by  difficulty with reasoning/logic and executive functioning skills. TOILET TRANSFER SBA. SHOWER TRANSFER CTG. TRANSFERS SBA. GAIT 160 FEET SBA. BATH SBA. LBD SBA WITH SET UP OF JOBST. UBD SBA. GROOMING SBA. TOILETING MIN. COLOSTOMY.   ELOS - SEPT 28    Disposition-September 28-maximize benefit from inpatient rehab program.  Medically stable.  Discharge home     Rehabilitation-admit for comprehensive acute inpatient rehabilitation .  This would be an interdisciplinary program with physical therapy 1.5 hour,  occupational therapy 1.5 hour,  5 days a week.  Rehabilitation nursing for carryover, monitoring of  GI   status, bowel and bladder, and skin  Ongoing physician follow-up.  Weekly team conferences.          Goal is for home with  Home health   therapies.  Barrier to discharge:  Impaired mobility and self care   - work on  Transfers, balance, gait, ADLS  to overcome.       Physical Exam near the time of discharge:    Awake, alert and 0x3  NAD  Heart: Regular rate and rhythm.  No rub murmur or gallop.  Lungs: Clear to auscultation  Abdomen: Colostomy  Normoactive bowel sounds.  Soft and nontender     Ext:  Good strength bilaterally  Trace edema bilateral lower extremities    RESULTS:  No results found for: POCGLU  Results from last 7 days   Lab Units 09/26/22  0806 09/22/22  0950   WBC 10*3/mm3 6.04 6.14   HEMOGLOBIN g/dL 9.3* 9.5*   HEMATOCRIT % 27.7* 28.1*   PLATELETS 10*3/mm3 278 215     Results from last 7 days   Lab Units 09/26/22  0806 09/22/22  0950   SODIUM mmol/L 138 140   POTASSIUM mmol/L 3.2* 3.8   CHLORIDE mmol/L 103 106   CO2 mmol/L 25.8 24.0   BUN mg/dL 11 10   CREATININE mg/dL 0.50* 0.60   CALCIUM mg/dL 9.2 9.0   GLUCOSE mg/dL 118* 99        Echocardiogram-September 1, 2022  Summary:                 The ejection fraction biplane was calculated at 65%.                            Intravenous contrast was used to enhance endocardial border definition.                            The left ventricular chamber size, wall thickness, and systolic function are within normal limits. There are                            no regional wall motion abnormalities observed.                            Abnormal left ventricular diastolic filling consistent with impaired relaxation.                            The peak instantaneous gradient of the aortic valve is 28.7 mmHg. The mean gradient of the aortic valve                            is 16 mmHg.                            Mild aortic leaflet calcification.                            Mild aortic stenosis.                            Trace aortic regurgitation is noted.             Discharge Medications      New Medications      Instructions Start Date   acetaminophen 325 MG tablet  Commonly known as: TYLENOL   650 mg, Oral, Every 6 Hours PRN      amLODIPine 5 MG tablet  Commonly known as: NORVASC   5 mg, Oral, Every 24 Hours Scheduled      atorvastatin 40 MG tablet  Commonly known as: LIPITOR   40 mg, Oral, Nightly      lisinopril 5 MG tablet  Commonly known as: PRINIVIL,ZESTRIL   5 mg, Oral, Every 24 Hours Scheduled         Continue These Medications      Instructions Start Date   albuterol sulfate  (90 Base) MCG/ACT inhaler  Commonly known as: PROVENTIL HFA;VENTOLIN HFA;PROAIR HFA   2 puffs, Inhalation, Every 4 Hours PRN      pantoprazole 20 MG EC tablet  Commonly known as: PROTONIX   20 mg, Oral, Daily      sertraline 25 MG tablet  Commonly known as: ZOLOFT   25 mg, Oral, Daily      Vitamin D3 50 MCG (2000 UT) capsule   2,000 Units, Oral, Daily         Stop These Medications    simvastatin 40 MG tablet  Commonly known as: Anne Disla MD Ghiassi, Mahan,  [phone] [facility] [address]  [facility] [id number] Steps: Follow up on 12/1/2022Appointment: Instructions: Dec 1, 2022 at 10:00    ===    Roseanna Cope MD Deatley, Kelly Renee, MDJefferson [phone] [facility] [id number] Steps: Follow upAppointment: Instructions: Follow up appointment October 6, 2022 @ 9:30am with Dr. Mary Ellen Rios.  If discharge date changes please call office to reschedule.    ===    John Cerrato MD - General Surgery John Cerrato MD - General SurgeryNext Steps: Follow up on 10/5/2022Appointment: Instructions: [facility]  [address] [place] [address]  Phone: [phone] , Fax [phone]  ----------  At 11:20 A.M.    Faxed face sheet & H&P, requested by Kimberli the .    ===    Hossein Rivers MD Gormley, John Michael, [facility] and [phone] [facility] [address]  [facility] [id number] Steps: Follow upAppointment: Instructions: No follow up needed with Dr Rivers    ===    [facility] [phone] [facility] [id number] Steps: Appointment: Instructions: You will be receiving home PT and NSG. They will call to schedule in home visits.    ===    DX:  S/P COLOSTOMY, IMMOBILITY SYNDROME - home health PT and NSG.     >30 on discharge    Hossein Rivers MD

## 2022-09-28 NOTE — PROGRESS NOTES
SECTION GG    Eating Performance Discharge: Patient completed the activities by him/herself  with no assistance from a helper.    Section B. Health Literacy  Frequency of Needing Assistance Reading:  Rarely    Section D. Mood  Presence of little interest or pleasure in doing things:   No  Frequency of having little interest or pleasure in doing things:   Never or 1  day  Presence of feeling down, depressed, or hopeless:   Yes  Frequency of feeling down, depressed, or hopeless:   Never or 1 day   Interview Ended. Above responses do not meet criteria to continue  Total Severity Score:   0    Section D. Social Isolation  Frequency of Feeling Lonely or Isolated:  Never    Section J. Health Conditions (Pain Effect on Sleep)  Pain Effect on Sleep:   Rarely or not at all    Signed by: Alysa Junior RN

## 2022-09-28 NOTE — PROGRESS NOTES
Patient d/c home today, 9/28, with . Daughters to assist intermittently.  and daughters have had teaching with ostomy nurse regarding changing and emptying colostomy. Ostomy nurse provided initial supplies for home.  [facility] to provide home PT and NSG.

## 2022-09-28 NOTE — PLAN OF CARE
Goal Outcome Evaluation:  Plan of Care Reviewed With: patient        Progress: improving  Outcome Evaluation: A&OX4. Calm, cooperative, and pleasant. Forgetful at times.  at bedside. Participated fully in therapy. Aneurysm. Abdominal incision. R. elbow has an incision, SPRING, scabbed. Colostomy to L. lower abdomen. Colostomy bag last changed y/d evening by night nurse. One-piece with belt: extra appliances in her room. Wears glasses. Diet Reg, thins. Pain meds given for HA today. Meds whole with water. Continent B&B. Urinary frequency. BM y/d. F/C completed. D/C Wed. 9/28.  has been checked off to transfer patient and ambulate in her room.

## 2022-09-28 NOTE — PLAN OF CARE
Goal Outcome Evaluation:      Slept well in Recliner chair. Daughter at bedside. Colostomy emptied small amount of stools tonight. No c/o pain.

## 2022-09-28 NOTE — PROGRESS NOTES
"Section C. BIMS  Brief Interview for Mental Status (BIMS) was conducted.  Repetition of Three Words: Three words  Able to report correct year: Correct  Able to report correct month: Accurate within 5 days  Able to report correct day of the week: Correct  Able to recall \"sock\": Yes, no cue required  Able to recall \"blue\": Yes, no cue required  Able to recall \"bed\": Yes, no cue required    BIMS SUMMARY SCORE: 15 Cognitively intact    Section C. Signs and Symptoms of Delirium (from CAM)  Acute Change in Mental Status:   No  Inattention:   Behavior not present  Disorganized Thinking:   Behavior not present  Altered Level of Consciousness:   Behavior not present    Signed by: Marlen Ochoa, SLP    "

## 2022-09-28 NOTE — PROGRESS NOTES
Inpatient Rehabilitation Discharge  Section A. Transportation  Issues Due to Lack of Transportation:  No    Section A. Medication List  Subsequent Provider:  06 - Home under care of organized home health service  organization  Medication List to Subsequent Provider at Discharge:  Yes - Current reconciled  medication list provided to the subsequent provider  Route(s) of Medication List Transmission to Subsequent Provider:  Electronic  Health Record  Discharge Location:  01 - Home  Medication List to Patient at Discharge:  Yes - Current reconciled medication  list provided to the patient, family and/or caregiver  Route(s) of Medication List Transmission to Patient:  Verbal (e.g., in-person,  telephone, video conferencing), Paper-based (e.g., fax, copies, printouts)    Signed by: PAU Manriquez